# Patient Record
Sex: MALE | Race: OTHER | NOT HISPANIC OR LATINO | Employment: FULL TIME | URBAN - METROPOLITAN AREA
[De-identification: names, ages, dates, MRNs, and addresses within clinical notes are randomized per-mention and may not be internally consistent; named-entity substitution may affect disease eponyms.]

---

## 2018-06-20 ENCOUNTER — OFFICE VISIT (OUTPATIENT)
Dept: URGENT CARE | Facility: PHYSICIAN GROUP | Age: 48
End: 2018-06-20
Payer: COMMERCIAL

## 2018-06-20 VITALS
TEMPERATURE: 96.9 F | BODY MASS INDEX: 24.5 KG/M2 | OXYGEN SATURATION: 97 % | HEIGHT: 71 IN | WEIGHT: 175 LBS | SYSTOLIC BLOOD PRESSURE: 124 MMHG | DIASTOLIC BLOOD PRESSURE: 84 MMHG | HEART RATE: 84 BPM | RESPIRATION RATE: 17 BRPM

## 2018-06-20 DIAGNOSIS — S09.93XA FACIAL INJURY, INITIAL ENCOUNTER: ICD-10-CM

## 2018-06-20 PROCEDURE — 99203 OFFICE O/P NEW LOW 30 MIN: CPT | Performed by: NURSE PRACTITIONER

## 2018-06-20 ASSESSMENT — ENCOUNTER SYMPTOMS
FEVER: 0
CHILLS: 0

## 2018-06-20 ASSESSMENT — PAIN SCALES - GENERAL: PAINLEVEL: NO PAIN

## 2018-06-20 NOTE — PROGRESS NOTES
"Subjective:      Oneal Tillman is a 47 y.o. male who presents with Facial Injury (hit with door monday night)    History reviewed. No pertinent past medical history.  Social History     Social History   • Marital status: Single     Spouse name: N/A   • Number of children: N/A   • Years of education: N/A     Occupational History   • Not on file.     Social History Main Topics   • Smoking status: Current Every Day Smoker     Packs/day: 1.00   • Smokeless tobacco: Never Used   • Alcohol use Yes      Comment: rarely   • Drug use: No   • Sexual activity: Not on file     Other Topics Concern   • Not on file     Social History Narrative   • No narrative on file     History reviewed. No pertinent family history.    Allergies: Patient has no known allergies.    Patient is a 39-year-old male who presents with complaints of localized swelling, bruising, and contusion left eye. Symptoms started 2 days ago. States he was walking into his house and the door/deadbolt hit him in the nose and left eye. Patient denies any pain at this time. He states vision is blurred but he attributes this possibly to swelling. He is concerned about his eye. He is concerned about swelling because he cannot work with his eye swollen slightly. Denies any other injury.            Facial Injury   This is a new problem. Episode onset: 2 days ago. The problem occurs constantly. The problem has been unchanged. Pertinent negatives include no chills or fever. Associated symptoms comments: Left sided facial pain. Nothing aggravates the symptoms. He has tried nothing for the symptoms. The treatment provided no relief.       Review of Systems   Constitutional: Positive for malaise/fatigue. Negative for chills and fever.   HENT:        Left sided facial pain   All other systems reviewed and are negative.         Objective:     /84   Pulse 84   Temp 36.1 °C (96.9 °F)   Resp 17   Ht 1.803 m (5' 11\")   Wt 79.4 kg (175 lb)   SpO2 97%   BMI 24.41 kg/m²  "     Physical Exam   Constitutional: He is oriented to person, place, and time. He appears well-developed and well-nourished. No distress.   HENT:   Head:       Contusion with swelling and discoloration around the eye. No point tenderness over the zygoma or the eyebrow, no point tenderness around the eye.  No point tenderness over the nose. There is a superficial abrasion noted to the left side of the nose.   Eyes:   Superficial swelling of the upper and lower eyelids. PERRLA. Conjunctiva of the left eye is red and injected.   Neck: Normal range of motion. Neck supple.   Cardiovascular: Normal rate and regular rhythm.    Pulmonary/Chest: Effort normal and breath sounds normal.   Neurological: He is alert and oriented to person, place, and time.   Skin: Skin is warm and dry. Capillary refill takes less than 2 seconds. He is not diaphoretic.   Psychiatric: He has a normal mood and affect. His behavior is normal.   Vitals reviewed.    Visual acuity noted          Assessment/Plan:     1. Facial injury, initial encounter  2. Left eye injury  -cold compresses/ice  -ibuprofen as needed  -[facility]; stated patient could come in after 1:00 this afternoon.

## 2024-04-13 ENCOUNTER — OFFICE VISIT (OUTPATIENT)
Dept: URGENT CARE | Facility: CLINIC | Age: 54
End: 2024-04-13
Payer: COMMERCIAL

## 2024-04-13 ENCOUNTER — HOSPITAL ENCOUNTER (INPATIENT)
Facility: MEDICAL CENTER | Age: 54
DRG: 871 | End: 2024-04-13
Attending: EMERGENCY MEDICINE | Admitting: HOSPITALIST
Payer: COMMERCIAL

## 2024-04-13 ENCOUNTER — APPOINTMENT (OUTPATIENT)
Dept: RADIOLOGY | Facility: MEDICAL CENTER | Age: 54
DRG: 871 | End: 2024-04-13
Attending: EMERGENCY MEDICINE
Payer: COMMERCIAL

## 2024-04-13 VITALS
RESPIRATION RATE: 20 BRPM | TEMPERATURE: 98.3 F | DIASTOLIC BLOOD PRESSURE: 80 MMHG | HEART RATE: 98 BPM | SYSTOLIC BLOOD PRESSURE: 120 MMHG | HEIGHT: 71 IN | OXYGEN SATURATION: 100 % | BODY MASS INDEX: 23.8 KG/M2 | WEIGHT: 170 LBS

## 2024-04-13 DIAGNOSIS — B95.5 BACTEREMIA DUE TO STREPTOCOCCUS: ICD-10-CM

## 2024-04-13 DIAGNOSIS — A41.9 SEPSIS WITH ACUTE RENAL FAILURE WITHOUT SEPTIC SHOCK, DUE TO UNSPECIFIED ORGANISM, UNSPECIFIED ACUTE RENAL FAILURE TYPE (HCC): ICD-10-CM

## 2024-04-13 DIAGNOSIS — A41.9 SEPTIC SHOCK (HCC): ICD-10-CM

## 2024-04-13 DIAGNOSIS — L03.116 CELLULITIS OF LEFT LOWER EXTREMITY: ICD-10-CM

## 2024-04-13 DIAGNOSIS — R57.9 SHOCK (HCC): ICD-10-CM

## 2024-04-13 DIAGNOSIS — S81.802A WOUND OF LEFT LOWER EXTREMITY, INITIAL ENCOUNTER: ICD-10-CM

## 2024-04-13 DIAGNOSIS — M79.89 LEG SWELLING: ICD-10-CM

## 2024-04-13 DIAGNOSIS — E87.20 METABOLIC ACIDOSIS: ICD-10-CM

## 2024-04-13 DIAGNOSIS — E87.6 HYPOKALEMIA: ICD-10-CM

## 2024-04-13 DIAGNOSIS — N17.9 ACUTE KIDNEY INJURY (HCC): ICD-10-CM

## 2024-04-13 DIAGNOSIS — N17.9 SEPSIS WITH ACUTE RENAL FAILURE WITHOUT SEPTIC SHOCK, DUE TO UNSPECIFIED ORGANISM, UNSPECIFIED ACUTE RENAL FAILURE TYPE (HCC): ICD-10-CM

## 2024-04-13 DIAGNOSIS — M79.605 PAIN OF LEFT LOWER EXTREMITY: ICD-10-CM

## 2024-04-13 DIAGNOSIS — R65.20 SEPSIS WITH ACUTE RENAL FAILURE WITHOUT SEPTIC SHOCK, DUE TO UNSPECIFIED ORGANISM, UNSPECIFIED ACUTE RENAL FAILURE TYPE (HCC): ICD-10-CM

## 2024-04-13 DIAGNOSIS — R65.21 SEPTIC SHOCK (HCC): ICD-10-CM

## 2024-04-13 DIAGNOSIS — E87.1 HYPONATREMIA: ICD-10-CM

## 2024-04-13 DIAGNOSIS — R78.81 BACTEREMIA DUE TO STREPTOCOCCUS: ICD-10-CM

## 2024-04-13 DIAGNOSIS — E83.39 HYPOPHOSPHATEMIA: ICD-10-CM

## 2024-04-13 PROBLEM — F17.200 TOBACCO DEPENDENCE: Status: ACTIVE | Noted: 2024-04-13

## 2024-04-13 PROBLEM — E86.0 DEHYDRATION: Status: ACTIVE | Noted: 2024-04-13

## 2024-04-13 PROBLEM — R73.9 HYPERGLYCEMIA: Status: ACTIVE | Noted: 2024-04-13

## 2024-04-13 LAB
ALBUMIN SERPL BCP-MCNC: 3.8 G/DL (ref 3.2–4.9)
ALBUMIN/GLOB SERPL: 1.2 G/DL
ALP SERPL-CCNC: 112 U/L (ref 30–99)
ALT SERPL-CCNC: 11 U/L (ref 2–50)
ANION GAP SERPL CALC-SCNC: 19 MMOL/L (ref 7–16)
AST SERPL-CCNC: 16 U/L (ref 12–45)
BASOPHILS # BLD AUTO: 0 % (ref 0–1.8)
BASOPHILS # BLD: 0 K/UL (ref 0–0.12)
BILIRUB SERPL-MCNC: 0.6 MG/DL (ref 0.1–1.5)
BUN SERPL-MCNC: 46 MG/DL (ref 8–22)
CALCIUM ALBUM COR SERPL-MCNC: 8.9 MG/DL (ref 8.5–10.5)
CALCIUM SERPL-MCNC: 8.7 MG/DL (ref 8.4–10.2)
CHLORIDE SERPL-SCNC: 95 MMOL/L (ref 96–112)
CO2 SERPL-SCNC: 20 MMOL/L (ref 20–33)
CREAT SERPL-MCNC: 2.6 MG/DL (ref 0.5–1.4)
EOSINOPHIL # BLD AUTO: 0 K/UL (ref 0–0.51)
EOSINOPHIL NFR BLD: 0 % (ref 0–6.9)
ERYTHROCYTE [DISTWIDTH] IN BLOOD BY AUTOMATED COUNT: 43.8 FL (ref 35.9–50)
GFR SERPLBLD CREATININE-BSD FMLA CKD-EPI: 28 ML/MIN/1.73 M 2
GLOBULIN SER CALC-MCNC: 3.3 G/DL (ref 1.9–3.5)
GLUCOSE BLD STRIP.AUTO-MCNC: 209 MG/DL (ref 65–99)
GLUCOSE SERPL-MCNC: 273 MG/DL (ref 65–99)
HCT VFR BLD AUTO: 46.7 % (ref 42–52)
HGB BLD-MCNC: 15.5 G/DL (ref 14–18)
LACTATE SERPL-SCNC: 2.8 MMOL/L (ref 0.5–2)
LACTATE SERPL-SCNC: 3.7 MMOL/L (ref 0.5–2)
LACTATE SERPL-SCNC: 4.3 MMOL/L (ref 0.5–2)
LG PLATELETS BLD QL SMEAR: NORMAL
LYMPHOCYTES # BLD AUTO: 0.45 K/UL (ref 1–4.8)
LYMPHOCYTES NFR BLD: 3 % (ref 22–41)
MANUAL DIFF BLD: ABNORMAL
MCH RBC QN AUTO: 29.9 PG (ref 27–33)
MCHC RBC AUTO-ENTMCNC: 33.2 G/DL (ref 32.3–36.5)
MCV RBC AUTO: 90.2 FL (ref 81.4–97.8)
METAMYELOCYTES NFR BLD MANUAL: 15 %
MONOCYTES # BLD AUTO: 0.3 K/UL (ref 0–0.85)
MONOCYTES NFR BLD AUTO: 2 % (ref 0–13.4)
NEUTROPHILS # BLD AUTO: 12 K/UL (ref 1.82–7.42)
NEUTROPHILS NFR BLD: 59 % (ref 44–72)
NEUTS BAND NFR BLD MANUAL: 21 % (ref 0–10)
NRBC # BLD AUTO: 0 K/UL
NRBC BLD-RTO: 0 /100 WBC (ref 0–0.2)
PLATELET # BLD AUTO: 286 K/UL (ref 164–446)
PLATELET BLD QL SMEAR: NORMAL
PMV BLD AUTO: 10.3 FL (ref 9–12.9)
POTASSIUM SERPL-SCNC: 4.2 MMOL/L (ref 3.6–5.5)
PROT SERPL-MCNC: 7.1 G/DL (ref 6–8.2)
RBC # BLD AUTO: 5.18 M/UL (ref 4.7–6.1)
RBC BLD AUTO: NORMAL
SODIUM SERPL-SCNC: 134 MMOL/L (ref 135–145)
TOXIC GRANULES BLD QL SMEAR: NORMAL
VARIANT LYMPHS BLD QL SMEAR: NORMAL
WBC # BLD AUTO: 15 K/UL (ref 4.8–10.8)
WBC TOXIC VACUOLES BLD QL SMEAR: NORMAL

## 2024-04-13 PROCEDURE — 93005 ELECTROCARDIOGRAM TRACING: CPT

## 2024-04-13 PROCEDURE — 99291 CRITICAL CARE FIRST HOUR: CPT | Performed by: HOSPITALIST

## 2024-04-13 PROCEDURE — C1751 CATH, INF, PER/CENT/MIDLINE: HCPCS

## 2024-04-13 PROCEDURE — 87040 BLOOD CULTURE FOR BACTERIA: CPT

## 2024-04-13 PROCEDURE — 700105 HCHG RX REV CODE 258: Performed by: HOSPITALIST

## 2024-04-13 PROCEDURE — 83605 ASSAY OF LACTIC ACID: CPT | Mod: 91

## 2024-04-13 PROCEDURE — 99291 CRITICAL CARE FIRST HOUR: CPT

## 2024-04-13 PROCEDURE — 3074F SYST BP LT 130 MM HG: CPT | Performed by: REGISTERED NURSE

## 2024-04-13 PROCEDURE — 700102 HCHG RX REV CODE 250 W/ 637 OVERRIDE(OP): Performed by: HOSPITALIST

## 2024-04-13 PROCEDURE — 87077 CULTURE AEROBIC IDENTIFY: CPT

## 2024-04-13 PROCEDURE — 700111 HCHG RX REV CODE 636 W/ 250 OVERRIDE (IP): Performed by: HOSPITALIST

## 2024-04-13 PROCEDURE — 87015 SPECIMEN INFECT AGNT CONCNTJ: CPT

## 2024-04-13 PROCEDURE — 700111 HCHG RX REV CODE 636 W/ 250 OVERRIDE (IP): Performed by: EMERGENCY MEDICINE

## 2024-04-13 PROCEDURE — 82962 GLUCOSE BLOOD TEST: CPT

## 2024-04-13 PROCEDURE — 87086 URINE CULTURE/COLONY COUNT: CPT

## 2024-04-13 PROCEDURE — 96367 TX/PROPH/DG ADDL SEQ IV INF: CPT

## 2024-04-13 PROCEDURE — 3079F DIAST BP 80-89 MM HG: CPT | Performed by: REGISTERED NURSE

## 2024-04-13 PROCEDURE — 87181 SC STD AGAR DILUTION PER AGT: CPT

## 2024-04-13 PROCEDURE — 02HV33Z INSERTION OF INFUSION DEVICE INTO SUPERIOR VENA CAVA, PERCUTANEOUS APPROACH: ICD-10-PCS | Performed by: EMERGENCY MEDICINE

## 2024-04-13 PROCEDURE — 80053 COMPREHEN METABOLIC PANEL: CPT

## 2024-04-13 PROCEDURE — 96376 TX/PRO/DX INJ SAME DRUG ADON: CPT

## 2024-04-13 PROCEDURE — 85007 BL SMEAR W/DIFF WBC COUNT: CPT

## 2024-04-13 PROCEDURE — 96365 THER/PROPH/DIAG IV INF INIT: CPT

## 2024-04-13 PROCEDURE — 700101 HCHG RX REV CODE 250: Performed by: HOSPITALIST

## 2024-04-13 PROCEDURE — 36415 COLL VENOUS BLD VENIPUNCTURE: CPT

## 2024-04-13 PROCEDURE — 770022 HCHG ROOM/CARE - ICU (200)

## 2024-04-13 PROCEDURE — 99406 BEHAV CHNG SMOKING 3-10 MIN: CPT

## 2024-04-13 PROCEDURE — 96375 TX/PRO/DX INJ NEW DRUG ADDON: CPT

## 2024-04-13 PROCEDURE — 96368 THER/DIAG CONCURRENT INF: CPT

## 2024-04-13 PROCEDURE — 99203 OFFICE O/P NEW LOW 30 MIN: CPT | Performed by: REGISTERED NURSE

## 2024-04-13 PROCEDURE — 36556 INSERT NON-TUNNEL CV CATH: CPT

## 2024-04-13 PROCEDURE — 85027 COMPLETE CBC AUTOMATED: CPT

## 2024-04-13 PROCEDURE — 71045 X-RAY EXAM CHEST 1 VIEW: CPT

## 2024-04-13 PROCEDURE — 700105 HCHG RX REV CODE 258: Performed by: EMERGENCY MEDICINE

## 2024-04-13 PROCEDURE — 83036 HEMOGLOBIN GLYCOSYLATED A1C: CPT

## 2024-04-13 PROCEDURE — 81001 URINALYSIS AUTO W/SCOPE: CPT

## 2024-04-13 RX ORDER — AMOXICILLIN 250 MG
2 CAPSULE ORAL 2 TIMES DAILY
Status: DISCONTINUED | OUTPATIENT
Start: 2024-04-13 | End: 2024-05-01 | Stop reason: HOSPADM

## 2024-04-13 RX ORDER — OXYCODONE HYDROCHLORIDE 10 MG/1
10 TABLET ORAL
Status: DISCONTINUED | OUTPATIENT
Start: 2024-04-13 | End: 2024-05-01 | Stop reason: HOSPADM

## 2024-04-13 RX ORDER — POLYETHYLENE GLYCOL 3350 17 G/17G
1 POWDER, FOR SOLUTION ORAL
Status: DISCONTINUED | OUTPATIENT
Start: 2024-04-13 | End: 2024-05-01 | Stop reason: HOSPADM

## 2024-04-13 RX ORDER — NOREPINEPHRINE BITARTRATE 0.03 MG/ML
0-1 INJECTION, SOLUTION INTRAVENOUS CONTINUOUS
Status: DISCONTINUED | OUTPATIENT
Start: 2024-04-13 | End: 2024-04-14

## 2024-04-13 RX ORDER — ONDANSETRON 4 MG/1
4 TABLET, ORALLY DISINTEGRATING ORAL EVERY 4 HOURS PRN
Status: DISCONTINUED | OUTPATIENT
Start: 2024-04-13 | End: 2024-05-01 | Stop reason: HOSPADM

## 2024-04-13 RX ORDER — LINEZOLID 2 MG/ML
600 INJECTION, SOLUTION INTRAVENOUS EVERY 12 HOURS
Status: DISCONTINUED | OUTPATIENT
Start: 2024-04-13 | End: 2024-04-15

## 2024-04-13 RX ORDER — CEFAZOLIN 2 G/1
2 INJECTION, POWDER, FOR SOLUTION INTRAMUSCULAR; INTRAVENOUS ONCE
Status: COMPLETED | OUTPATIENT
Start: 2024-04-13 | End: 2024-04-13

## 2024-04-13 RX ORDER — NICOTINE 21 MG/24HR
14 PATCH, TRANSDERMAL 24 HOURS TRANSDERMAL
Status: DISCONTINUED | OUTPATIENT
Start: 2024-04-14 | End: 2024-05-01 | Stop reason: HOSPADM

## 2024-04-13 RX ORDER — OXYCODONE HYDROCHLORIDE 5 MG/1
5 TABLET ORAL
Status: DISCONTINUED | OUTPATIENT
Start: 2024-04-13 | End: 2024-05-01 | Stop reason: HOSPADM

## 2024-04-13 RX ORDER — SODIUM CHLORIDE 9 MG/ML
30 INJECTION, SOLUTION INTRAVENOUS ONCE
Status: COMPLETED | OUTPATIENT
Start: 2024-04-13 | End: 2024-04-13

## 2024-04-13 RX ORDER — PROMETHAZINE HYDROCHLORIDE 25 MG/1
12.5-25 TABLET ORAL EVERY 4 HOURS PRN
Status: DISCONTINUED | OUTPATIENT
Start: 2024-04-13 | End: 2024-05-01 | Stop reason: HOSPADM

## 2024-04-13 RX ORDER — ENOXAPARIN SODIUM 100 MG/ML
40 INJECTION SUBCUTANEOUS DAILY
Status: DISCONTINUED | OUTPATIENT
Start: 2024-04-13 | End: 2024-04-13

## 2024-04-13 RX ORDER — PROMETHAZINE HYDROCHLORIDE 25 MG/1
12.5-25 SUPPOSITORY RECTAL EVERY 4 HOURS PRN
Status: DISCONTINUED | OUTPATIENT
Start: 2024-04-13 | End: 2024-05-01 | Stop reason: HOSPADM

## 2024-04-13 RX ORDER — SODIUM CHLORIDE 9 MG/ML
30 INJECTION, SOLUTION INTRAVENOUS
Status: DISCONTINUED | OUTPATIENT
Start: 2024-04-13 | End: 2024-04-13

## 2024-04-13 RX ORDER — SODIUM CHLORIDE, SODIUM LACTATE, POTASSIUM CHLORIDE, CALCIUM CHLORIDE 600; 310; 30; 20 MG/100ML; MG/100ML; MG/100ML; MG/100ML
INJECTION, SOLUTION INTRAVENOUS CONTINUOUS
Status: DISCONTINUED | OUTPATIENT
Start: 2024-04-13 | End: 2024-04-16

## 2024-04-13 RX ORDER — IBUPROFEN 200 MG
400-600 TABLET ORAL EVERY 6 HOURS PRN
COMMUNITY

## 2024-04-13 RX ORDER — HEPARIN SODIUM 5000 [USP'U]/ML
5000 INJECTION, SOLUTION INTRAVENOUS; SUBCUTANEOUS EVERY 8 HOURS
Status: DISCONTINUED | OUTPATIENT
Start: 2024-04-13 | End: 2024-04-26

## 2024-04-13 RX ORDER — HYDROMORPHONE HYDROCHLORIDE 1 MG/ML
0.5 INJECTION, SOLUTION INTRAMUSCULAR; INTRAVENOUS; SUBCUTANEOUS ONCE
Status: COMPLETED | OUTPATIENT
Start: 2024-04-13 | End: 2024-04-13

## 2024-04-13 RX ORDER — ONDANSETRON 2 MG/ML
4 INJECTION INTRAMUSCULAR; INTRAVENOUS EVERY 4 HOURS PRN
Status: DISCONTINUED | OUTPATIENT
Start: 2024-04-13 | End: 2024-05-01 | Stop reason: HOSPADM

## 2024-04-13 RX ORDER — ACETAMINOPHEN 325 MG/1
650 TABLET ORAL EVERY 6 HOURS PRN
Status: DISCONTINUED | OUTPATIENT
Start: 2024-04-13 | End: 2024-05-01 | Stop reason: HOSPADM

## 2024-04-13 RX ORDER — PROCHLORPERAZINE EDISYLATE 5 MG/ML
5-10 INJECTION INTRAMUSCULAR; INTRAVENOUS EVERY 4 HOURS PRN
Status: DISCONTINUED | OUTPATIENT
Start: 2024-04-13 | End: 2024-05-01 | Stop reason: HOSPADM

## 2024-04-13 RX ORDER — SODIUM CHLORIDE, SODIUM LACTATE, POTASSIUM CHLORIDE, AND CALCIUM CHLORIDE .6; .31; .03; .02 G/100ML; G/100ML; G/100ML; G/100ML
500 INJECTION, SOLUTION INTRAVENOUS
Status: DISCONTINUED | OUTPATIENT
Start: 2024-04-13 | End: 2024-04-14

## 2024-04-13 RX ORDER — HYDROMORPHONE HYDROCHLORIDE 1 MG/ML
0.5 INJECTION, SOLUTION INTRAMUSCULAR; INTRAVENOUS; SUBCUTANEOUS
Status: DISCONTINUED | OUTPATIENT
Start: 2024-04-13 | End: 2024-05-01 | Stop reason: HOSPADM

## 2024-04-13 RX ADMIN — SODIUM CHLORIDE, POTASSIUM CHLORIDE, SODIUM LACTATE AND CALCIUM CHLORIDE: 600; 310; 30; 20 INJECTION, SOLUTION INTRAVENOUS at 22:09

## 2024-04-13 RX ADMIN — HYDROMORPHONE HYDROCHLORIDE 0.5 MG: 1 INJECTION, SOLUTION INTRAMUSCULAR; INTRAVENOUS; SUBCUTANEOUS at 21:16

## 2024-04-13 RX ADMIN — INSULIN HUMAN 3 UNITS: 100 INJECTION, SOLUTION PARENTERAL at 22:17

## 2024-04-13 RX ADMIN — PIPERACILLIN AND TAZOBACTAM 4.5 G: 4; .5 INJECTION, POWDER, FOR SOLUTION INTRAVENOUS at 18:57

## 2024-04-13 RX ADMIN — LINEZOLID 600 MG: 600 INJECTION, SOLUTION INTRAVENOUS at 20:47

## 2024-04-13 RX ADMIN — CEFAZOLIN 2 G: 2 INJECTION, POWDER, FOR SOLUTION INTRAMUSCULAR; INTRAVENOUS at 17:20

## 2024-04-13 RX ADMIN — NOREPINEPHRINE BITARTRATE 1 MCG/KG/MIN: 1 INJECTION, SOLUTION, CONCENTRATE INTRAVENOUS at 20:44

## 2024-04-13 RX ADMIN — SODIUM CHLORIDE 2310 ML: 9 INJECTION, SOLUTION INTRAVENOUS at 17:02

## 2024-04-13 RX ADMIN — HYDROMORPHONE HYDROCHLORIDE 0.5 MG: 1 INJECTION, SOLUTION INTRAMUSCULAR; INTRAVENOUS; SUBCUTANEOUS at 20:49

## 2024-04-13 RX ADMIN — HEPARIN SODIUM 5000 UNITS: 5000 INJECTION, SOLUTION INTRAVENOUS; SUBCUTANEOUS at 22:27

## 2024-04-13 RX ADMIN — SODIUM CHLORIDE, POTASSIUM CHLORIDE, SODIUM LACTATE AND CALCIUM CHLORIDE: 600; 310; 30; 20 INJECTION, SOLUTION INTRAVENOUS at 18:15

## 2024-04-13 RX ADMIN — PIPERACILLIN AND TAZOBACTAM 4.5 G: 4; .5 INJECTION, POWDER, FOR SOLUTION INTRAVENOUS at 22:33

## 2024-04-13 ASSESSMENT — ENCOUNTER SYMPTOMS
FEVER: 1
BRUISES/BLEEDS EASILY: 0
FLANK PAIN: 0
DIZZINESS: 0
EYE REDNESS: 0
STRIDOR: 0
PALPITATIONS: 1
LOSS OF CONSCIOUSNESS: 0
COUGH: 0
FOCAL WEAKNESS: 0
CHILLS: 1
ABDOMINAL PAIN: 0
NERVOUS/ANXIOUS: 0
SHORTNESS OF BREATH: 0
VOMITING: 0
PALPITATIONS: 0
EYE DISCHARGE: 0
SHORTNESS OF BREATH: 0
CHILLS: 1

## 2024-04-13 ASSESSMENT — PATIENT HEALTH QUESTIONNAIRE - PHQ9
2. FEELING DOWN, DEPRESSED, IRRITABLE, OR HOPELESS: NOT AT ALL
1. LITTLE INTEREST OR PLEASURE IN DOING THINGS: NOT AT ALL
SUM OF ALL RESPONSES TO PHQ9 QUESTIONS 1 AND 2: 0

## 2024-04-13 ASSESSMENT — COGNITIVE AND FUNCTIONAL STATUS - GENERAL
SUGGESTED CMS G CODE MODIFIER DAILY ACTIVITY: CH
MOBILITY SCORE: 24
SUGGESTED CMS G CODE MODIFIER MOBILITY: CH
DAILY ACTIVITIY SCORE: 24

## 2024-04-13 ASSESSMENT — LIFESTYLE VARIABLES
CONSUMPTION TOTAL: NEGATIVE
DOES PATIENT WANT TO STOP DRINKING: NO
EVER HAD A DRINK FIRST THING IN THE MORNING TO STEADY YOUR NERVES TO GET RID OF A HANGOVER: NO
HOW MANY TIMES IN THE PAST YEAR HAVE YOU HAD 5 OR MORE DRINKS IN A DAY: 0
HAVE YOU EVER FELT YOU SHOULD CUT DOWN ON YOUR DRINKING: NO
TOTAL SCORE: 0
EVER FELT BAD OR GUILTY ABOUT YOUR DRINKING: NO
HAVE PEOPLE ANNOYED YOU BY CRITICIZING YOUR DRINKING: NO
ALCOHOL_USE: NO
ON A TYPICAL DAY WHEN YOU DRINK ALCOHOL HOW MANY DRINKS DO YOU HAVE: 0
TOTAL SCORE: 0
AVERAGE NUMBER OF DAYS PER WEEK YOU HAVE A DRINK CONTAINING ALCOHOL: 0
TOTAL SCORE: 0

## 2024-04-13 ASSESSMENT — FIBROSIS 4 INDEX
FIB4 SCORE: 0.91
FIB4 SCORE: 0.91

## 2024-04-13 ASSESSMENT — PAIN DESCRIPTION - PAIN TYPE
TYPE: ACUTE PAIN
TYPE: ACUTE PAIN

## 2024-04-13 NOTE — LETTER
"  FORM C-4:  EMPLOYEE’S CLAIM FOR COMPENSATION/ REPORT OF INITIAL TREATMENT  EMPLOYEE’S CLAIM - PROVIDE ALL INFORMATION REQUESTED   First Name Oneal Last Name Primo Birthdate 1970  Sex male Claim Number   Home Address [address]   [place]             Zip [address]                                   Age  53 y.o. Height  1.803 m (5' 10.98\") Weight  80.2 kg (176 lb 12.9 oz) N  [id number]   Mailing Address 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 (if applicable) Working    How did this injury or occupational disease occur? Be specific and answer in detail. Use additional sheet if necessary)  Banged my shin in a steel concrete form stake that was driven in the ground   If you believe that you have an occupational disease, when did you first have knowledge of the disability and it relationship to your employment? N/A Witnesses to the Accident  Whole crew   Nature of Injury or Occupational Disease  Workers' Compensation Part(s) of Body Injured or Affected  Lower Leg (L), N/A, N/A    I CERTIFY THAT THE ABOVE IS TRUE AND CORRECT TO THE BEST OF MY KNOWLEDGE AND THAT I HAVE PROVIDED THIS INFORMATION IN ORDER TO OBTAIN THE BENEFITS OF [place] " IN[place]IVE OR [place]).  I HEREBY AUTHORIZE ANY PHYSICIAN, CHIROPRACTOR, SURGEON, PRACTITIONER, OR OTHER PERSON, ANY HOSPITAL, INCLUDING [facility], ANY MEDICAL SERVICE ORGANIZATION, ANY INSURANCE COMPANY, OR OTHER INSTITUTION OR ORGANIZATION TO RELEASE TO EACH OTHER, ANY MEDICAL OR OTHER INFORMATION, INCLUDING BENEFITS PAID OR PAYABLE, PERTINENT TO THIS INJURY OR DISEASE, EXCEPT INFORMATION RELATIVE TO DIAGNOSIS, TREATMENT AND/OR COUNSELING FOR AIDS, PSYCHOLOGICAL CONDITIONS, ALCOHOL OR CONTROLLED SUBSTANCES, FOR WHICH I MUST GIVE SPECIFIC AUTHORIZATION.  A PHOTOSTAT OF THIS AUTHORIZATION SHALL BE AS VALID AS THE ORIGINAL.  Date   4/16/2024            Place  [facility]                          Employee’s Signature   THIS REPORT MUST BE COMPLETED AND MAILED WITHIN 3 WORKING DAYS OF TREATMENT   Place [facility]                       Name of Facility [facility]   Date  4/13/2024 Diagnosis  (L03.116) Cellulitis of left lower extremity  (A41.9,  R65.20,  N17.9) Sepsis with acute renal failure without septic shock, due to unspecified organism, unspecified acute renal failure type (HCC) Is there evidence the injured employee was under the influence of alcohol and/or another controlled substance at the time of accident?   Hour  12:35 PM Description of Injury or Disease  Cellulitis of left lower extremity  Sepsis with acute renal failure without septic shock, due to unspecified organism, unspecified acute renal failure type (HCC) No   Treatment  Antibiotics, vasopressors, admission  Have you advised the patient to remain off work five days or more?         No   X-Ray Findings  Negative If Yes   From Date    To Date      From information given by the employee, together with medical evidence, can you directly connect this injury or occupational disease as job incurred? Yes If No, is employee capable of: Full Duty  No Modified Duty  No   Is additional medical  "care by a physician indicated? Yes If Modified Duty, Specify any Limitations / Restrictions   Patient is currently admitted to the hospital and will require clearance by admitting physician prior to returning to work.   Do you know of any previous injury or disease contributing to this condition or occupational disease? No    Date 4/16/2024 Print Doctor’s Name LexyMayuren S GLORIA certify the employer’s copy of this form was mailed on:   Address [address] [place] [id number] [phone] INSURER’S USE ONLY   Provider’s Tax ID Number   Telephone Dept: [phone]    Doctor’s Signature e-LESLI Lujan M.D. Degree  MD      Form C-4 (rev.10/07)                                                                         BRIEF DESCRIPTION OF RIGHTS AND BENEFITS  (Pursuant to [place] 616C.[place])    Notice of Injury or Occupational Disease (Incident Report Form C-1): If an injury or occupational disease (OD) arises out of and in the course of employment, you must provide written notice to your employer as soon as practicable, but no later than 7 days after the accident or OD. Your employer shall maintain a sufficient supply of the required forms.    Claim for Compensation (Form C-4): If medical treatment is sought, the form C-4 is available at the place of initial treatment. A completed \"Claim for Compensation\" (Form C-4) must be filed within 90 days after an accident or OD. The treating physician or chiropractor must, within 3 working days after treatment, complete and mail to the employer, the employer's insurer and third-party , the Claim for Compensation.    Medical Treatment: If you require medical treatment for your on-the-job injury or OD, you may be required to select a physician or chiropractor from a list provided by your workers’ compensation insurer, if it has contracted with an Organization for Managed Care (MCO) or Preferred Provider Organization (PPO) or providers of health care. If your " employer has not entered into a contract with an MCO or PPO, you may select a physician or chiropractor from the Panel of Physicians and Chiropractors. Any medical costs related to your industrial injury or OD will be paid by your insurer.    Temporary Total Disability (TTD): If your doctor has certified that you are unable to work for a period of at least 5 consecutive days, or 5 cumulative days in a 20-day period, or places restrictions on you that your employer does not accommodate, you may be entitled to TTD compensation.    Temporary Partial Disability (TPD): If the wage you receive upon reemployment is less than the compensation for TTD to which you are entitled, the insurer may be required to pay you TPD compensation to make up the difference. TPD can only be paid for a maximum of 24 months.    Permanent Partial Disability (PPD): When your medical condition is stable and there is an indication of a PPD as a result of your injury or OD, within 30 days, your insurer must arrange for an evaluation by a rating physician or chiropractor to determine the degree of your PPD. The amount of your PPD award depends on the date of injury, the results of the PPD evaluation, your age and wage.    Permanent Total Disability (PTD): If you are medically certified by a treating physician or chiropractor as permanently and totally disabled and have been granted a PTD status by your insurer, you are entitled to receive monthly benefits not to exceed 66 2/3% of your average monthly wage. The amount of your PTD payments is subject to reduction if you previously received a lump-sum PPD award.    Vocational Rehabilitation Services: You may be eligible for vocational rehabilitation services if you are unable to return to the job due to a permanent physical impairment or permanent restrictions as a result of your injury or occupational disease.    Transportation and Per Shira Reimbursement: You may be eligible for travel expenses and  per noe associated with medical treatment.    Reopening: You may be able to reopen your claim if your condition worsens after claim closure.     Appeal Process: If you disagree with a written determination issued by the insurer or the insurer does not respond to your request, you may appeal to the Department of Administration, , by following the instructions contained in your determination letter. You must appeal the determination within 70 days from the date of the determination letter at [address], [address], [place], [place] [address], or [address], [address], [place], [place] [address]. If you disagree with the  decision, you may appeal to the Department of Administration, . You must file your appeal within 30 days from the date of the  decision letter at [address], [address], [place], [place] [address], or [address] [facility], [place], [place] [address]. If you disagree with a decision of an , you may file a petition for judicial review with the District Court. You must do so within 30 days of the Appeal Officer’s decision. You may be represented by an  at your own expense or you may contact the [facility] for possible representation.    [place]  for Injured Workers (NAIW): If you disagree with a  decision, you may request that NAIW represent you without charge at an  Hearing. For information regarding denial of benefits, you may contact the [facility] at: [address], [address], [place] [address], [phone], or [address] [facility] [address] [facility] [address] [address], [place] [address], [phone]    To File a Complaint with the Division: If you wish to file a complaint with the  of the Division of Industrial Relations (DIR),  please contact the Workers’ Compensation Section, [address], [address], [place], [place] [address], telephone  [phone], or [address], [address], [place], [place] [address], telephone [phone].    For assistance with Workers’ Compensation Issues: You may contact the [facility] Office for Consumer Health Assistance, [address], [address], [place] [address], Toll Free [phone], Web site: http://[facility].[place].gov/Programs/LATISHA E-mail: latisha@[facility].[place].gov  D-2 (rev. 10/20)              __________________________________________________________________                                    _________________            Employee Name / Signature                                                                                                                            Date

## 2024-04-13 NOTE — ED TRIAGE NOTES
"Chief Complaint   Patient presents with    Leg Swelling     L leg x1 week. Denies CP or SOB. Denies wound or rash. Endorses nausea.    Body Aches     Reports Monday, feeling that he had h/a, body aches, nausea. Reports improvement Tuesday but states \"then I couldn't hold food down\". Reports N/V persists.      Physical Exam  Pulmonary:      Effort: Pulmonary effort is normal.   Skin:     General: Skin is warm and dry.   Neurological:      Mental Status: He is alert.         "

## 2024-04-13 NOTE — ED PROVIDER NOTES
"ED Provider Note    Primary care provider: Pcp Not In Computer    CHIEF COMPLAINT  Chief Complaint   Patient presents with    Leg Swelling     L leg x1 week. Denies CP or SOB. Denies wound or rash. Endorses nausea.    Body Aches     Reports Monday, feeling that he had h/a, body aches, nausea. Reports improvement Tuesday but states \"then I couldn't hold food down\". Reports N/V persists.          HPI  Oneal Tillman is a 53 y.o. male smoker,  who presents to the Emergency Department 4 days of gradually worsening pain in the left lower leg.  He sustained an abrasion last week at work, he did not think much of it as he has had this multiple times previously.  About 5 days ago he noted some lesions surrounding the abrasion.  Over the past 4 days he had noticed redness, swelling, pain over the posterior left thigh, gradually creeping proximally.  He denies any fevers or chills.  Denies any lightheadedness.  Notes the pain is worse when he walks.  Denies any history of diabetes or DVT/PE.  Does not take any medications.      External Record Review: Patient has not been seen in our facility previously, he did go to the urgent care today for left lower extremity swelling, they transferred him here for further evaluation, no testing was done.    REVIEW OF SYSTEMS  See HPI.     PAST MEDICAL HISTORY   has a past medical history of Patient denies medical problems.    SURGICAL HISTORY  patient denies any surgical history    SOCIAL HISTORY  Social History     Tobacco Use    Smoking status: Every Day     Current packs/day: 1.00     Types: Cigarettes    Smokeless tobacco: Never   Vaping Use    Vaping Use: Never used   Substance Use Topics    Alcohol use: Yes     Comment: rarely    Drug use: No      Social History     Substance and Sexual Activity   Drug Use No       FAMILY HISTORY  No family history on file.    CURRENT MEDICATIONS  Reviewed.  See Encounter Summary.     ALLERGIES  No Known Allergies    PHYSICAL " "EXAM  VITAL SIGNS: BP (!) 86/59   Pulse 94   Temp 36.1 °C (96.9 °F) (Temporal)   Resp 20   Ht 1.803 m (5' 11\")   Wt 77 kg (169 lb 12.1 oz)   SpO2 100%   BMI 23.68 kg/m²   Constitutional: Awake, alert in no apparent distress.  HENT: Normocephalic, Bilateral external ears normal. Nose normal.   Eyes: Conjunctiva normal, non-icteric, EOMI.    Thorax & Lungs: Easy unlabored respirations, Clear to ascultation bilaterally.  Cardiovascular: Tachycardic, No murmurs, rubs or gallops. Bilateral pulses symmetrical.   Abdomen:  Soft, nontender, nondistended, normal active bowel sounds.   :    Skin: Visualized skin is  Dry, No erythema, No rash.   Musculoskeletal:   See photograph below, left lower extremity has localized swelling over the distal thigh, this area is indurated, red, warm to the touch but nontender.  Trace knee effusion, good range of motion of the knee without difficulty.  Neurologic: Alert, Grossly non-focal.   Psychiatric: Normal affect, Normal mood  Lymphatic:      RADIOLOGY  I have independently interpreted the diagnostic imaging associated with this visit and am waiting the final reading from the radiologist.   My preliminary interpretation is as follows: No gas, no definitive abscess noted on CT.    Radiologist interpretation:   CT-EXTREMITY, LOWER W/O LEFT   Final Result      1.  Left lower extremity edema versus cellulitis. No drainable fluid collections on this noncontrast study.   2.  Enlarged left external iliac and inguinal lymph nodes. They may be reactive to the left lower extremity infection, however, neoplastic nodes are difficult to rule out. Close clinical follow-up is needed.      DX-CHEST-PORTABLE (1 VIEW)   Final Result      No acute cardiopulmonary abnormality.             COURSE & MEDICAL DECISION MAKING  Pertinent Labs & Imaging studies reviewed. (See chart for details)    COURSE & MEDICAL DECISION MAKING  Pertinent Labs & Imaging studies reviewed. (See chart for " details)    Differential diagnoses include but are not limited to: Most likely cellulitis/sepsis, less likely DVT, much less likely necrotizing fasciitis    4:15 PM - Nursing notes reviewed, patient seen and examined at bedside.  Patient appears septic, antibiotics will be initiated at this time.    5:13 PM: Patient with significant bandemia, renal failure, hyperglycemia, much higher risk for necrotizing fasciitis.  Ultrasound canceled, CT ordered    5:45 PM CT does not show any compelling evidence of neck testing fasciitis.  Given my concern I did briefly talk with Dr. Love, general surgery.  He will follow along, without any gas or fluid collection, unlikely surgical.    6 PM patient reassessed, systolic blood pressure 86.  No longer tachycardic, patient feels fine, having minimal pain at this time.  Thus far has only received 600 cc of IV fluids.  Repeat lactate modestly improved to 3.7    6:21 PM: Case discussed with Dr. Hayes who will evaluate the patient for admission.    8:30 PM patient still quite hypotensive despite 30 cc/kg bolus.  Will place central line.    9:09 PM: Central line complete, I verified placement on chest x-ray.    Discussion of management with other medical personnel: General surgery, hospitalist      Barriers to care at this time, including but not limited to: Patient does not have established PCP.     Decision tools and prescription drugs considered including, but not limited to: LRINEC score possibly as high as 10, CRP not obtained.    Decision Making:  This is a pleasant 53 y.o. year old male who presents with about 4 to 5 days of redness, swelling of the left lower leg.  The patient presents tachycardic, hypotensive.  Exam looks to be like cellulitis.  He has significant laboratory abnormalities, notably hyperglycemia, bandemia, leukocytosis, ANDRE.  He was aggressively resuscitated, treated with Unasyn immediately upon arrival to the ER.  CT does not show any compelling evidence  of necrotizing fasciitis.  I believe he discussed with general surgery who will follow along.  Patient's lactate cleared mildly but this was only after about 600 cc of fluids which I think is a reasonable sign of improvement.    Despite this he is well-appearing, but will need aggressive treatment with continued fluids and antibiotics.  At this time maps over 65, does not need pressors.    CRITICAL CARE  The very real possibilty of a deterioration of this patient's condition required the highest level of my preparedness for sudden, emergent intervention.  I provided critical care services, which included medication orders, frequent reevaluations of the patient's condition and response to treatment, ordering and reviewing test results, and discussing the case with various consultants.  The critical care time associated with the care of the patient was 30 minutes. Review chart for interventions. This time is exclusive of any other billable procedures.           FINAL IMPRESSION  1. Cellulitis of left lower extremity    2. Sepsis with acute renal failure without septic shock, due to unspecified organism, unspecified acute renal failure type (HCC)       Central Line Insertion    Date/Time: 4/13/2024 9:09 PM    Performed by: Leno Jones M.D.  Authorized by: Leno Jones M.D.    Consent:     Consent obtained:  Verbal    Consent given by:  Patient    Risks discussed:  Infection, pneumothorax and bleeding    Alternatives discussed:  No treatment  Pre-procedure details:     Hand hygiene: Hand hygiene performed prior to insertion      Sterile barrier technique: All elements of maximal sterile technique followed      Skin preparation:  2% chlorhexidine    Skin preparation agent: Skin preparation agent completely dried prior to procedure    Sedation:     Sedation type: Received Dilaudid 0.5 mg prior to procedure.  Anesthesia:     Anesthesia method:  Local infiltration    Local anesthetic:  Lidocaine 1% w/o epi  Procedure  details:     Location:  R internal jugular    Patient position:  Reverse Trendelenburg    Procedural supplies:  Triple lumen    Catheter size:  8.5 Fr    Landmarks identified: yes      Ultrasound guidance: yes      Sterile ultrasound techniques: Sterile gel and sterile probe covers were used      Number of attempts:  1    Successful placement: yes    Post-procedure details:     Post-procedure:  Dressing applied and line sutured    Guidewire: guidewire removal confirmed      Assessment:  Blood return through all ports, no pneumothorax on x-ray, placement verified by x-ray and free fluid flow    Patient tolerance of procedure:  Tolerated well, no immediate complications

## 2024-04-13 NOTE — PROGRESS NOTES
"Subjective:   Oneal Tillman is a 53 y.o. male who presents for Leg Pain (X 5 days, LT leg swelling, bruising, concerned about an insect bite. Nausea. Stinging, burning.)      HPI  5 days ago developed flu like symptoms, with some vomiting on Tuesday. On Tuesday he developed left leg swelling, gradually worsening. Pain is burning and tingling t/o the leg. Pain rated 6/10. He is having worsening pain with weight bearing activities. No trauma or injury.  Entire left leg is swollen with erythema and warmth noted posteriorly. Denies hx of blood clots, no recent long travel, he does smoke.     Review of Systems   Constitutional:  Positive for chills.   Respiratory:  Negative for shortness of breath.    Cardiovascular:  Positive for leg swelling. Negative for chest pain and palpitations.   Neurological:  Negative for dizziness and loss of consciousness.       Medications, Allergies, and current problem list reviewed today in Epic.     Objective:     /80   Pulse 98   Temp 36.8 °C (98.3 °F) (Temporal)   Resp 20   Ht 1.803 m (5' 11\")   Wt 77.1 kg (170 lb)   SpO2 100%     Physical Exam  Vitals and nursing note reviewed.   Constitutional:       Appearance: Normal appearance. He is ill-appearing. He is not toxic-appearing.   HENT:      Mouth/Throat:      Mouth: Mucous membranes are moist.   Eyes:      Pupils: Pupils are equal, round, and reactive to light.   Cardiovascular:      Rate and Rhythm: Normal rate and regular rhythm.      Heart sounds: No murmur heard.  Pulmonary:      Effort: Pulmonary effort is normal. No respiratory distress.      Breath sounds: Normal breath sounds.   Musculoskeletal:         General: Normal range of motion.      Cervical back: Normal range of motion.      Left lower leg: Edema present.      Comments: Left lower extremity significantly larger than right.  Skin is taut.  Erythema warmth and tenderness primarily posteriorly.  Decreased range of motion secondary to pain.   Skin:     " General: Skin is warm and dry.      Capillary Refill: Capillary refill takes less than 2 seconds.      Findings: Erythema present.             Comments: Erythema warmth tenderness noted to the posterior aspect of left lower extremity.  Significant swelling compared to right.  C shaped rash on the left shin.  Cap refill appears to be less distally left lower extremity versus right.   Neurological:      General: No focal deficit present.      Mental Status: He is alert and oriented to person, place, and time.      Cranial Nerves: No cranial nerve deficit.   Psychiatric:         Mood and Affect: Mood normal.         Assessment/Plan:     I personally reviewed prior external notes and test results pertinent to today's visit as well as additional imaging and testing completed in clinic today.     1. Leg swelling        2. Pain of left lower extremity            Oneal Tillman is a very pleasant 53 y.o. male presenting with worsening left lower extremity pain, swelling, redness and warmth.  There was no trauma or injury.  Denies a history of blood clots and no recent long travel but he does smoke.  Pain is progressively worsening and having decreased ability to ambulate secondary to pain. On exam the entire left leg is swollen, red, tender, warm, and it extends from ankle to upper thigh. Decreased cap refill in left distal LE compared to right. He does appear ill.  Thankfully at this time as his vitals are reassuring.Given history and physical patient requires higher level of care to rule out DVT, ddx also includes cellulitis. Patient is cash pay but he is aggreeable to this plan since we cannot do an appropriate workup in a timely manner outpatient.      Please note that this dictation was created using voice recognition software. I have made every reasonable attempt to correct obvious errors, but I expect that there are errors of grammar and possibly content that I did not discover before finalizing the note.    This  note was electronically signed by STANLEY Echeverria

## 2024-04-13 NOTE — ED NOTES
Pharmacy Medication Reconciliation      ~Medication reconciliation updated and complete per patient at bedside   ~Allergies have been verified   ~No oral ABX within the last 30 days  ~Patient home pharmacy :  [facility]      ~Anticoagulants (rivaroxaban, apixaban, edoxaban, dabigatran, warfarin, enoxaparin) taken in the last 14 days? No

## 2024-04-13 NOTE — LETTER
"  FORM C-4:  EMPLOYEE’S CLAIM FOR COMPENSATION/ REPORT OF INITIAL TREATMENT  EMPLOYEE’S CLAIM - PROVIDE ALL INFORMATION REQUESTED   First Name Oneal Last Name Primo Birthdate 1970  Sex male Claim Number   Home Address [address]   [place]             Zip [address]                                   Age  53 y.o. Height  1.803 m (5' 10.98\") Weight  80.2 kg (176 lb 12.9 oz) [facility]     Mailing Address 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 (if applicable) Working    How did this injury or occupational disease occur? Be specific and answer in detail. Use additional sheet if necessary)  Banged my shin in a steel concrete form stake that was driven in the ground   If you believe that you have an occupational disease, when did you first have knowledge of the disability and it relationship to your employment? N/A Witnesses to the Accident  Whole crew   Nature of Injury or Occupational Disease  Workers' Compensation Part(s) of Body Injured or Affected  Lower Leg (L), N/A, N/A    I CERTIFY THAT THE ABOVE IS TRUE AND CORRECT TO THE BEST OF MY KNOWLEDGE AND THAT I HAVE PROVIDED THIS INFORMATION IN ORDER TO OBTAIN THE BENEFITS OF [place]).  I HEREBY AUTHORIZE ANY PHYSICIAN, CHIROPRACTOR, SURGEON, PRACTITIONER, OR OTHER PERSON, ANY HOSPITAL, INCLUDING [facility], ANY MEDICAL SERVICE ORGANIZATION, ANY INSURANCE COMPANY, OR OTHER INSTITUTION OR ORGANIZATION TO RELEASE TO EACH OTHER, ANY MEDICAL OR OTHER INFORMATION, INCLUDING BENEFITS PAID OR PAYABLE, PERTINENT TO THIS INJURY OR DISEASE, EXCEPT INFORMATION RELATIVE TO DIAGNOSIS, TREATMENT AND/OR COUNSELING FOR AIDS, PSYCHOLOGICAL CONDITIONS, ALCOHOL OR CONTROLLED SUBSTANCES, FOR WHICH I MUST GIVE SPECIFIC AUTHORIZATION.  A PHOTOSTAT OF THIS AUTHORIZATION SHALL BE AS VALID AS THE ORIGINAL.  Date   4/16/24          [facility]                                        Employee’s Signature   THIS REPORT MUST BE COMPLETED AND MAILED WITHIN 3 WORKING DAYS OF TREATMENT   [facility]                       Name of Facility [facility]   Date  4/13/2024 Diagnosis  (L03.116) Cellulitis of left lower extremity  (A41.9,  R65.20,  N17.9) Sepsis with acute renal failure without septic shock, due to unspecified organism, unspecified acute renal failure type (HCC) Is there evidence the injured employee was under the influence of alcohol and/or another controlled substance at the time of accident?   Hour  12:44 PM Description of Injury or Disease  Cellulitis of left lower extremity  Sepsis with acute renal failure without septic shock, due to unspecified organism, unspecified acute renal failure type (HCC) No   Treatment  Antibiotics, vasopressors, admission  Have you advised the patient to remain off work five days or more?         No   X-Ray Findings  Negative If Yes   From Date    To Date      From information given by the employee, together with medical evidence, can you directly connect this injury or occupational disease as job incurred? Yes If No, is employee capable of: Full Duty  No Modified Duty  No   Is  "additional medical care by a physician indicated? Yes If Modified Duty, Specify any Limitations / Restrictions   Patient is currently admitted to the hospital and will require clearance by admitting physician prior to returning to work.   Do you know of any previous injury or disease contributing to this condition or occupational disease? No    Date 4/16/2024 Print Doctor’s Name Lesli Pugh S GLORIA certify the employer’s copy of this form was mailed on:   Address [address] [place] [id number] [phone] INSURER’S USE ONLY   Provider’s Tax ID Number   Telephone Dept: [phone]    Doctor’s Signature e-LESLI Lujan M.D. Degree  MD      Form C-4 (rev.10/07)                                                                         BRIEF DESCRIPTION OF RIGHTS AND BENEFITS  (Pursuant to [place])    Notice of Injury or Occupational Disease (Incident Report Form C-1): If an injury or occupational disease (OD) arises out of and in the course of employment, you must provide written notice to your employer as soon as practicable, but no later than 7 days after the accident or OD. Your employer shall maintain a sufficient supply of the required forms.    Claim for Compensation (Form C-4): If medical treatment is sought, the form C-4 is available at the place of initial treatment. A completed \"Claim for Compensation\" (Form C-4) must be filed within 90 days after an accident or OD. The treating physician or chiropractor must, within 3 working days after treatment, complete and mail to the employer, the employer's insurer and third-party , the Claim for Compensation.    Medical Treatment: If you require medical treatment for your on-the-job injury or OD, you may be required to select a physician or chiropractor from a list provided by your workers’ compensation insurer, if it has contracted with an Organization for Managed Care (MCO) or Preferred Provider Organization (PPO) or providers of health " care. If your employer has not entered into a contract with an MCO or PPO, you may select a physician or chiropractor from the Panel of Physicians and Chiropractors. Any medical costs related to your industrial injury or OD will be paid by your insurer.    Temporary Total Disability (TTD): If your doctor has certified that you are unable to work for a period of at least 5 consecutive days, or 5 cumulative days in a 20-day period, or places restrictions on you that your employer does not accommodate, you may be entitled to TTD compensation.    Temporary Partial Disability (TPD): If the wage you receive upon reemployment is less than the compensation for TTD to which you are entitled, the insurer may be required to pay you TPD compensation to make up the difference. TPD can only be paid for a maximum of 24 months.    Permanent Partial Disability (PPD): When your medical condition is stable and there is an indication of a PPD as a result of your injury or OD, within 30 days, your insurer must arrange for an evaluation by a rating physician or chiropractor to determine the degree of your PPD. The amount of your PPD award depends on the date of injury, the results of the PPD evaluation, your age and wage.    Permanent Total Disability (PTD): If you are medically certified by a treating physician or chiropractor as permanently and totally disabled and have been granted a PTD status by your insurer, you are entitled to receive monthly benefits not to exceed 66 2/3% of your average monthly wage. The amount of your PTD payments is subject to reduction if you previously received a lump-sum PPD award.    Vocational Rehabilitation Services: You may be eligible for vocational rehabilitation services if you are unable to return to the job due to a permanent physical impairment or permanent restrictions as a result of your injury or occupational disease.    Transportation and Per Shira Reimbursement: You may be eligible for travel  expenses and per noe associated with medical treatment.    Reopening: You may be able to reopen your claim if your condition worsens after claim closure.     Appeal Process: If you disagree with a written determination issued by the insurer or the insurer does not respond to your request, you may appeal to the Department of Administration, , by following the instructions contained in your determination letter. You must appeal the determination within 70 days from the date of the determination letter at [address], [address], [place], [place] [address], or [address], [place], [place] [address]. If you disagree with the  decision, you may appeal to the Department of Administration, . You must file your appeal within 30 days from the date of the  decision letter at [address], [address], [place], [place] [address], or [address], [place], [place] [address]. If you disagree with a decision of an , you may file a petition for judicial review with the District Court. You must do so within 30 days of the Appeal Officer’s decision. You may be represented by an  at your own expense or you may contact the [facility] for possible representation.    [place]  for Injured Workers (NAIW): If you disagree with a  decision, you may request that NAIW represent you without charge at an  Hearing. For information regarding denial of benefits, you may contact the [facility] at: [address], [address], [place] [address], [phone], or [address], [place] [address], [phone]    To File a Complaint with the Division: If you wish to file a complaint with the  of the Division of Industrial Relations (DIR),  please contact the Workers’ Compensation Section, [address], [address], [place], [place] [address],  telephone [phone], or [address], [address], [place], [place] [address], telephone [phone].    For assistance with Workers’ Compensation Issues: You may contact the [facility] Office for Consumer Health Assistance, [address], [address], [place] [address], Toll Free 1-182.483.9373, Web site: http://[facility].[place].gov/Programs/LATISHA E-mail: latisha@[email or web address].[place].gov  D-2 (rev. 10/20)              __________________________________________________________________                                    _________________            Employee Name / Signature                                                                                                                            Date

## 2024-04-14 ENCOUNTER — APPOINTMENT (OUTPATIENT)
Dept: RADIOLOGY | Facility: MEDICAL CENTER | Age: 54
DRG: 871 | End: 2024-04-14
Attending: STUDENT IN AN ORGANIZED HEALTH CARE EDUCATION/TRAINING PROGRAM
Payer: COMMERCIAL

## 2024-04-14 LAB
ALBUMIN SERPL BCP-MCNC: 2.2 G/DL (ref 3.2–4.9)
ALBUMIN/GLOB SERPL: 0.8 G/DL
ALP SERPL-CCNC: 67 U/L (ref 30–99)
ALT SERPL-CCNC: 11 U/L (ref 2–50)
ANION GAP SERPL CALC-SCNC: 12 MMOL/L (ref 7–16)
APPEARANCE UR: ABNORMAL
AST SERPL-CCNC: 14 U/L (ref 12–45)
BACTERIA #/AREA URNS HPF: ABNORMAL /HPF
BILIRUB SERPL-MCNC: 0.7 MG/DL (ref 0.1–1.5)
BILIRUB UR QL STRIP.AUTO: NEGATIVE
BUN SERPL-MCNC: 34 MG/DL (ref 8–22)
CALCIUM ALBUM COR SERPL-MCNC: 9.1 MG/DL (ref 8.5–10.5)
CALCIUM SERPL-MCNC: 7.7 MG/DL (ref 8.4–10.2)
CHLORIDE SERPL-SCNC: 103 MMOL/L (ref 96–112)
CO2 SERPL-SCNC: 20 MMOL/L (ref 20–33)
COLOR UR: YELLOW
CREAT SERPL-MCNC: 1.28 MG/DL (ref 0.5–1.4)
EKG IMPRESSION: NORMAL
EPI CELLS #/AREA URNS HPF: ABNORMAL /HPF
ERYTHROCYTE [DISTWIDTH] IN BLOOD BY AUTOMATED COUNT: 44.4 FL (ref 35.9–50)
EST. AVERAGE GLUCOSE BLD GHB EST-MCNC: 120 MG/DL
FLUAV RNA SPEC QL NAA+PROBE: NEGATIVE
FLUBV RNA SPEC QL NAA+PROBE: NEGATIVE
GFR SERPLBLD CREATININE-BSD FMLA CKD-EPI: 66 ML/MIN/1.73 M 2
GLOBULIN SER CALC-MCNC: 2.7 G/DL (ref 1.9–3.5)
GLUCOSE BLD STRIP.AUTO-MCNC: 127 MG/DL (ref 65–99)
GLUCOSE BLD STRIP.AUTO-MCNC: 138 MG/DL (ref 65–99)
GLUCOSE BLD STRIP.AUTO-MCNC: 153 MG/DL (ref 65–99)
GLUCOSE BLD STRIP.AUTO-MCNC: 164 MG/DL (ref 65–99)
GLUCOSE SERPL-MCNC: 123 MG/DL (ref 65–99)
GLUCOSE UR STRIP.AUTO-MCNC: NEGATIVE MG/DL
GRAN CASTS #/AREA URNS LPF: ABNORMAL /LPF
HBA1C MFR BLD: 5.8 % (ref 4–5.6)
HCT VFR BLD AUTO: 39.7 % (ref 42–52)
HGB BLD-MCNC: 13.2 G/DL (ref 14–18)
HYALINE CASTS #/AREA URNS LPF: ABNORMAL /LPF
KETONES UR STRIP.AUTO-MCNC: ABNORMAL MG/DL
LACTATE SERPL-SCNC: 2.1 MMOL/L (ref 0.5–2)
LACTATE SERPL-SCNC: 2.6 MMOL/L (ref 0.5–2)
LEUKOCYTE ESTERASE UR QL STRIP.AUTO: NEGATIVE
MAGNESIUM SERPL-MCNC: 1.6 MG/DL (ref 1.5–2.5)
MCH RBC QN AUTO: 30.1 PG (ref 27–33)
MCHC RBC AUTO-ENTMCNC: 33.2 G/DL (ref 32.3–36.5)
MCV RBC AUTO: 90.6 FL (ref 81.4–97.8)
MICRO URNS: ABNORMAL
MUCOUS THREADS #/AREA URNS HPF: ABNORMAL /HPF
NITRITE UR QL STRIP.AUTO: NEGATIVE
PH UR STRIP.AUTO: 5.5 [PH] (ref 5–8)
PLATELET # BLD AUTO: 211 K/UL (ref 164–446)
PMV BLD AUTO: 10.6 FL (ref 9–12.9)
POTASSIUM SERPL-SCNC: 3.7 MMOL/L (ref 3.6–5.5)
PROT SERPL-MCNC: 4.9 G/DL (ref 6–8.2)
PROT UR QL STRIP: 30 MG/DL
RBC # BLD AUTO: 4.38 M/UL (ref 4.7–6.1)
RBC UR QL AUTO: NEGATIVE
RSV RNA SPEC QL NAA+PROBE: NEGATIVE
SARS-COV-2 RNA RESP QL NAA+PROBE: NOTDETECTED
SODIUM SERPL-SCNC: 135 MMOL/L (ref 135–145)
SP GR UR STRIP.AUTO: 1.02
SPECIMEN SOURCE: NORMAL
WBC # BLD AUTO: 6.8 K/UL (ref 4.8–10.8)
WBC #/AREA URNS HPF: ABNORMAL /HPF

## 2024-04-14 PROCEDURE — 770020 HCHG ROOM/CARE - TELE (206)

## 2024-04-14 PROCEDURE — 0241U HCHG SARS-COV-2 COVID-19 NFCT DS RESP RNA 4 TRGT MIC: CPT

## 2024-04-14 PROCEDURE — 700105 HCHG RX REV CODE 258: Performed by: STUDENT IN AN ORGANIZED HEALTH CARE EDUCATION/TRAINING PROGRAM

## 2024-04-14 PROCEDURE — 80053 COMPREHEN METABOLIC PANEL: CPT

## 2024-04-14 PROCEDURE — 99291 CRITICAL CARE FIRST HOUR: CPT | Performed by: STUDENT IN AN ORGANIZED HEALTH CARE EDUCATION/TRAINING PROGRAM

## 2024-04-14 PROCEDURE — A9270 NON-COVERED ITEM OR SERVICE: HCPCS | Performed by: HOSPITALIST

## 2024-04-14 PROCEDURE — 94760 N-INVAS EAR/PLS OXIMETRY 1: CPT

## 2024-04-14 PROCEDURE — 83735 ASSAY OF MAGNESIUM: CPT

## 2024-04-14 PROCEDURE — 700102 HCHG RX REV CODE 250 W/ 637 OVERRIDE(OP): Performed by: HOSPITALIST

## 2024-04-14 PROCEDURE — 700111 HCHG RX REV CODE 636 W/ 250 OVERRIDE (IP): Performed by: HOSPITALIST

## 2024-04-14 PROCEDURE — 83605 ASSAY OF LACTIC ACID: CPT

## 2024-04-14 PROCEDURE — 82962 GLUCOSE BLOOD TEST: CPT | Mod: 91

## 2024-04-14 PROCEDURE — 93971 EXTREMITY STUDY: CPT | Mod: LT

## 2024-04-14 PROCEDURE — 700105 HCHG RX REV CODE 258: Performed by: HOSPITALIST

## 2024-04-14 PROCEDURE — 700111 HCHG RX REV CODE 636 W/ 250 OVERRIDE (IP): Mod: JZ | Performed by: STUDENT IN AN ORGANIZED HEALTH CARE EDUCATION/TRAINING PROGRAM

## 2024-04-14 PROCEDURE — 85027 COMPLETE CBC AUTOMATED: CPT

## 2024-04-14 RX ADMIN — AMPICILLIN AND SULBACTAM 3 G: 1; 2 INJECTION, POWDER, FOR SOLUTION INTRAMUSCULAR; INTRAVENOUS at 23:48

## 2024-04-14 RX ADMIN — SODIUM CHLORIDE, POTASSIUM CHLORIDE, SODIUM LACTATE AND CALCIUM CHLORIDE: 600; 310; 30; 20 INJECTION, SOLUTION INTRAVENOUS at 17:34

## 2024-04-14 RX ADMIN — INSULIN HUMAN 2 UNITS: 100 INJECTION, SOLUTION PARENTERAL at 07:09

## 2024-04-14 RX ADMIN — OXYCODONE HYDROCHLORIDE 10 MG: 10 TABLET ORAL at 05:43

## 2024-04-14 RX ADMIN — LINEZOLID 600 MG: 600 INJECTION, SOLUTION INTRAVENOUS at 05:45

## 2024-04-14 RX ADMIN — HEPARIN SODIUM 5000 UNITS: 5000 INJECTION, SOLUTION INTRAVENOUS; SUBCUTANEOUS at 05:39

## 2024-04-14 RX ADMIN — OXYCODONE HYDROCHLORIDE 5 MG: 5 TABLET ORAL at 23:44

## 2024-04-14 RX ADMIN — SENNOSIDES AND DOCUSATE SODIUM 2 TABLET: 50; 8.6 TABLET ORAL at 05:40

## 2024-04-14 RX ADMIN — OXYCODONE HYDROCHLORIDE 10 MG: 10 TABLET ORAL at 00:16

## 2024-04-14 RX ADMIN — NICOTINE 14 MG: 14 PATCH, EXTENDED RELEASE TRANSDERMAL at 13:11

## 2024-04-14 RX ADMIN — INSULIN HUMAN 2 UNITS: 100 INJECTION, SOLUTION PARENTERAL at 11:34

## 2024-04-14 RX ADMIN — OXYCODONE HYDROCHLORIDE 5 MG: 5 TABLET ORAL at 13:02

## 2024-04-14 RX ADMIN — AMPICILLIN AND SULBACTAM 3 G: 1; 2 INJECTION, POWDER, FOR SOLUTION INTRAMUSCULAR; INTRAVENOUS at 17:43

## 2024-04-14 RX ADMIN — AMPICILLIN AND SULBACTAM 3 G: 1; 2 INJECTION, POWDER, FOR SOLUTION INTRAMUSCULAR; INTRAVENOUS at 11:39

## 2024-04-14 RX ADMIN — PIPERACILLIN AND TAZOBACTAM 4.5 G: 4; .5 INJECTION, POWDER, FOR SOLUTION INTRAVENOUS at 05:37

## 2024-04-14 RX ADMIN — SODIUM CHLORIDE, POTASSIUM CHLORIDE, SODIUM LACTATE AND CALCIUM CHLORIDE: 600; 310; 30; 20 INJECTION, SOLUTION INTRAVENOUS at 07:09

## 2024-04-14 RX ADMIN — HEPARIN SODIUM 5000 UNITS: 5000 INJECTION, SOLUTION INTRAVENOUS; SUBCUTANEOUS at 21:45

## 2024-04-14 RX ADMIN — HEPARIN SODIUM 5000 UNITS: 5000 INJECTION, SOLUTION INTRAVENOUS; SUBCUTANEOUS at 13:02

## 2024-04-14 RX ADMIN — LINEZOLID 600 MG: 600 INJECTION, SOLUTION INTRAVENOUS at 19:12

## 2024-04-14 RX ADMIN — OXYCODONE HYDROCHLORIDE 5 MG: 5 TABLET ORAL at 17:43

## 2024-04-14 ASSESSMENT — COGNITIVE AND FUNCTIONAL STATUS - GENERAL
SUGGESTED CMS G CODE MODIFIER MOBILITY: CH
SUGGESTED CMS G CODE MODIFIER DAILY ACTIVITY: CH
DAILY ACTIVITIY SCORE: 24
MOBILITY SCORE: 24

## 2024-04-14 ASSESSMENT — PAIN DESCRIPTION - PAIN TYPE
TYPE: ACUTE PAIN
TYPE: ACUTE PAIN
TYPE: ACUTE PAIN;CHRONIC PAIN
TYPE: ACUTE PAIN
TYPE: ACUTE PAIN;CHRONIC PAIN
TYPE: ACUTE PAIN

## 2024-04-14 ASSESSMENT — ENCOUNTER SYMPTOMS
VOMITING: 0
FALLS: 0
EYE REDNESS: 0
SHORTNESS OF BREATH: 0
WEAKNESS: 1
CHILLS: 1
COUGH: 0

## 2024-04-14 ASSESSMENT — FIBROSIS 4 INDEX: FIB4 SCORE: 1.23

## 2024-04-14 NOTE — CONSULTS
"        DATE OF CONSULTATION:  4/14/2024     REFERRING PHYSICIAN:   Dr. Jones in the emergency department    CONSULTING PHYSICIAN:  Isaac Love D.O.       HISTORY OF PRESENT ILLNESS: The patient is a 53-year-old male smoker who came to the emergency department for evaluation of malaise fatigue fevers nausea vomiting and left lower extremity pain and swelling.  Was found to have fairly to the significant soft tissue infection of the left leg but CT scan does not endorse soft tissue gas or abscess.  Patient's feels a little bit better since he has been receiving resuscitation and antibiotics and feels that the swelling in the leg is getting better.    PAST MEDICAL HISTORY:  has a past medical history of Patient denies medical problems.    PAST SURGICAL HISTORY:  has no past surgical history on file.    ALLERGIES: No Known Allergies    CURRENT MEDICATIONS:   Home Medications       Reviewed by Jg Robert (Pharmacy Tech) on 04/13/24 at 1654  Med List Status: Complete     Medication Last Dose Status   ibuprofen (MOTRIN) 200 MG Tab 4/13/2024 Active                  FAMILY HISTORY: family history is not on file.    SOCIAL HISTORY:  reports that he has been smoking cigarettes. He has never used smokeless tobacco. He reports current alcohol use. He reports that he does not use drugs.    REVIEW OF SYSTEMS: Review of systems is remarkable for the following body aches fever, nausea and vomiting, left thigh pain and swelling, poor appetite. The remainder of the comprehensive ROS is negative with the exception of the aforementioned HPI, PMH, and PSH bullets in accordance with CMS guideline.    PHYSICAL EXAMINATION:      Vital Signs: /54   Pulse 95   Temp 36.7 °C (98 °F) (Temporal)   Resp (!) 21   Ht 1.803 m (5' 10.98\")   Wt 79.9 kg (176 lb 2.4 oz)   SpO2 94%   Physical Exam  Vitals and nursing note reviewed.   HENT:      Head: Normocephalic and atraumatic.      Nose: Nose normal.      Mouth/Throat:      " Mouth: Mucous membranes are dry.      Pharynx: Oropharynx is clear.   Eyes:      Extraocular Movements: Extraocular movements intact.      Conjunctiva/sclera: Conjunctivae normal.   Cardiovascular:      Rate and Rhythm: Normal rate and regular rhythm.      Pulses: Normal pulses.   Pulmonary:      Effort: Pulmonary effort is normal. No respiratory distress.      Breath sounds: No stridor.   Abdominal:      General: There is no distension.      Palpations: Abdomen is soft.      Tenderness: There is no abdominal tenderness.   Musculoskeletal:      Cervical back: Normal range of motion and neck supple.      Comments: Left medial thigh swelling and soft tissue edema without crepitus or area of fluctuance.  Corresponding decreased range of motion of the left knee.  There is mild blanching erythema that appears to be improved since in the emergency department.    Otherwise no concerning findings or restriction of range of motion in the remainder of the extremities   Skin:     General: Skin is warm and dry.      Coloration: Skin is not pale.   Neurological:      General: No focal deficit present.      Mental Status: He is alert and oriented to person, place, and time.         LABORATORY VALUES:   Recent Labs     04/13/24  1619 04/14/24  0415   WBC 15.0* 6.8   RBC 5.18 4.38*   HEMOGLOBIN 15.5 13.2*   HEMATOCRIT 46.7 39.7*   MCV 90.2 90.6   MCH 29.9 30.1   MCHC 33.2 33.2   RDW 43.8 44.4   PLATELETCT 286 211   MPV 10.3 10.6     Recent Labs     04/13/24  1619 04/14/24  0415   SODIUM 134* 135   POTASSIUM 4.2 3.7   CHLORIDE 95* 103   CO2 20 20   GLUCOSE 273* 123*   BUN 46* 34*   CREATININE 2.60* 1.28   CALCIUM 8.7 7.7*     Recent Labs     04/13/24  1619 04/14/24  0415   ASTSGOT 16 14   ALTSGPT 11 11   TBILIRUBIN 0.6 0.7   ALKPHOSPHAT 112* 67   GLOBULIN 3.3 2.7            IMAGING:   DX-CHEST-FOR LINE PLACEMENT Perform procedure in: Patient's Room   Final Result      1.  Interval insertion of a central venous catheter which  terminates with the tip projecting over the expected region of the mid to distal superior vena cava.   2.  No acute cardiopulmonary.      CT-EXTREMITY, LOWER W/O LEFT   Final Result      1.  Left lower extremity edema versus cellulitis. No drainable fluid collections on this noncontrast study.   2.  Enlarged left external iliac and inguinal lymph nodes. They may be reactive to the left lower extremity infection, however, neoplastic nodes are difficult to rule out. Close clinical follow-up is needed.      DX-CHEST-PORTABLE (1 VIEW)   Final Result      No acute cardiopulmonary abnormality.             ASSESSMENT AND PLAN:   53-year-old male with sepsis and left lower extremity cellulitis that seems to be improving with antibiotic therapy and resuscitation.  I reviewed the CT scan and there is no findings that would suggest potential necrotizing soft tissue infection or abscess.  I do not think that acute surgical intervention is indicated at this time.    Continue medical management.  Please call the surgical service if there is any question or concern      Sepsis (HCC)  This is Sepsis Present on admission  SIRS criteria identified on my evaluation include: Tachycardia, with heart rate greater than 90 BPM, Leukocytosis, with WBC greater than 12,000, and Bandemia, greater than 10% bands  Clinical indicators of end organ dysfunction include Hypotension with systolic blood pressure less than 90 or MAP less than 65, Hypotension with decrease in systolic blood pressure of more than 40mmHg, and Lactic Acid greater than 2, acute kidney injury  Source is cellulitis  Sepsis protocol initiated  Crystalloid Fluid Administration: Fluid resuscitation ordered per standard protocol - 30 mL/kg per current or ideal body weight  IV antibiotics as appropriate for source of sepsis  Reassessment: I have reassessed the patient's hemodynamic status    Septic shock (HCC)  This is Sepsis Present on admission  SIRS criteria identified on my  evaluation include: Tachycardia, with heart rate greater than 90 BPM, Leukocytosis, with WBC greater than 12,000, and Bandemia, greater than 10% bands  Clinical indicators of end organ dysfunction include Hypotension with systolic blood pressure less than 90 or MAP less than 65, Hypotension with decrease in systolic blood pressure of more than 40mmHg, and Lactic Acid greater than 2  Source is cellulitis  Sepsis protocol initiated  Crystalloid Fluid Administration: Fluid resuscitation ordered per standard protocol - 30 mL/kg per current or ideal body weight  IV antibiotics as appropriate for source of sepsis  Reassessment: I have reassessed the patient's hemodynamic status   I will start on Levophed    Cellulitis of left lower extremity  I will start Zyvox and Zosyn, follow on cultures and sensitivities  General surgery will be consulted    Hyperglycemia  With marked hyperglycemia  I will check a glycated hemoglobin    I will start short acting insulin for now  I will order Accu-Checks, hypoglycemia protocol  Adjust according to blood sugars trend.     Acute kidney injury (HCC)  Mostly due to dehydration and sepsis  I will start intravenous fluids  Avoid / minimize nephrotoxins as much as possible.  Monitor inputs and outputs     Dehydration  Likely secondary to reduced oral intake, and increase in insensible loss due to infection, and sepsis.  Encourage oral intake as tolerated, antiemetics as needed.  Intravenous hydration until adequate oral intake is achieved.     Hyponatremia  Hypovolemic hyponatremia   I expect Na to improve with IVF     Metabolic acidosis  Likely due to reduced intravascular volume and secondary to sepsis.  I will start intravenous fluids    Anticipate improvement with intravenous fluids  Continue to monitor, I will order follow-up bicarb level        Tobacco dependence  I spent 4 minutes on Tobacco cessation education and counseling.   I Discussed the benefits of quitting smoking and risks of  continued smoking including cardiovascular disease, cancer and COPD.   Discussed the option of nicotine patch, and nicotine gum           ____________________________________     Isaac Love D.O.    DD: 4/14/2024  9:48 AM

## 2024-04-14 NOTE — ASSESSMENT & PLAN NOTE
WBC count down to 10.7, drainage minimal at the left knee  Transition to Augmentin, discussed with infectious disease

## 2024-04-14 NOTE — PROGRESS NOTES
12-hour chart check complete.    Monitor Summary  Rhythm: SR/ST  Rate: 90s-100s  Ectopy: NA  Measurements: .16/.10/.34

## 2024-04-14 NOTE — ASSESSMENT & PLAN NOTE
This is Sepsis Present on admission  SIRS criteria identified on my evaluation include: Tachycardia, with heart rate greater than 90 BPM, Leukocytosis, with WBC greater than 12,000, and Bandemia, greater than 10% bands  Clinical indicators of end organ dysfunction include Hypotension with systolic blood pressure less than 90 or MAP less than 65, Hypotension with decrease in systolic blood pressure of more than 40mmHg, and Lactic Acid greater than 2, acute kidney injury  Source is cellulitis  Sepsis protocol initiated  Crystalloid Fluid Administration: Fluid resuscitation ordered per standard protocol - 30 mL/kg per current or ideal body weight  IV antibiotics as appropriate for source of sepsis  Reassessment: I have reassessed the patient's hemodynamic status

## 2024-04-14 NOTE — PROGRESS NOTES
Critical Care Progress Note    Date of admission  4/13/2024    Chief Complaint/Hospital Course  53 y.o. male with history of tobacco use admitted 4/13/2024 with LLE pain, swelling and general weakness. Thought he might've had a spider bit on that leg a few days prior and then started having worsening pain, swelling and then generalized weakness, chills so he came to ER. In ER, he was tachy to 100s, hypotensive with SBP 80/50s and elevated lactate 4.3 -> given IVFs and started on levophed and antibiotics and admitted to ICU.       Interval Problem Update  Reviewed last 24 hour events:    Cardiac: HR 90s, BP 100s off levo  Pulm: RA  Heme: wbc down to 13.2, no e/o bleeding   /renal: Cr improving with IVFs  GI/endo: po diet  ID:  on linezolid and zosyn. Bcx GPCs   I/O: +5L  Additional Labs/Imaging:   - CT LLE w/o fluid collection     Review of Systems  Review of Systems   Constitutional:  Positive for chills and malaise/fatigue.   Eyes:  Negative for redness.   Respiratory:  Negative for cough and shortness of breath.    Cardiovascular:  Negative for chest pain.   Gastrointestinal:  Negative for vomiting.   Musculoskeletal:  Negative for falls.   Neurological:  Positive for weakness.        Vital Signs for last 24 hours   Temp:  [36.1 °C (96.9 °F)-37.1 °C (98.8 °F)] 36.7 °C (98 °F)  Pulse:  [] 95  Resp:  [15-29] 21  BP: ()/(51-78) 100/54  SpO2:  [56 %-100 %] 94 %    Hemodynamic parameters for last 24 hours       Respiratory Information for the last 24 hours       Physical Exam   Physical Exam  Vitals and nursing note reviewed.   Constitutional:       General: He is not in acute distress.  HENT:      Head: Normocephalic.      Mouth/Throat:      Mouth: Mucous membranes are moist.   Eyes:      Conjunctiva/sclera: Conjunctivae normal.   Cardiovascular:      Rate and Rhythm: Normal rate and regular rhythm.   Pulmonary:      Effort: Pulmonary effort is normal. No respiratory distress.   Abdominal:       Palpations: Abdomen is soft.   Skin:     Comments: LLE erythema, TTP   Neurological:      Mental Status: He is alert. Mental status is at baseline.   Psychiatric:         Mood and Affect: Mood normal.         Medications  Current Facility-Administered Medications   Medication Dose Route Frequency Provider Last Rate Last Admin    piperacillin-tazobactam (Zosyn) 4.5 g in  mL IVPB  4.5 g Intravenous Q8HRS Darrell Hayes M.D.   Stopped at 04/14/24 0937    Linezolid (Zyvox) premix 600 mg  600 mg Intravenous Q12HRS Darrell Hayes M.D.   Stopped at 04/14/24 0645    acetaminophen (Tylenol) tablet 650 mg  650 mg Oral Q6HRS PRN Darrell Hayes M.D.        senna-docusate (Pericolace Or Senokot S) 8.6-50 MG per tablet 2 Tablet  2 Tablet Oral BID Darrell Hayes M.D.   2 Tablet at 04/14/24 0540    And    polyethylene glycol/lytes (Miralax) Packet 1 Packet  1 Packet Oral QDAY PRN Darrell Hayes M.D.        LR (Bolus) infusion 500 mL  500 mL Intravenous Once PRN Darrell Hayes M.D.        lactated ringers infusion   Intravenous Continuous Darrell Hayes M.D. 125 mL/hr at 04/14/24 0709 New Bag at 04/14/24 0709    Pharmacy Consult Request ...Pain Management Review 1 Each  1 Each Other PHARMACY TO DOSE Darrell Hayes M.D.        oxyCODONE immediate-release (Roxicodone) tablet 5 mg  5 mg Oral Q3HRS PRN Darrell Hayes M.D.        Or    oxyCODONE immediate release (Roxicodone) tablet 10 mg  10 mg Oral Q3HRS PRN Darrell Hayes M.D.   10 mg at 04/14/24 0543    Or    HYDROmorphone (Dilaudid) injection 0.5 mg  0.5 mg Intravenous Q3HRS PRN Darrell Hayes M.D.   0.5 mg at 04/13/24 2049    ondansetron (Zofran) syringe/vial injection 4 mg  4 mg Intravenous Q4HRS PRN Darrell Hayes M.D.        ondansetron (Zofran ODT) dispertab 4 mg  4 mg Oral Q4HRS PRN Darrell Hayes M.D.        promethazine (Phenergan) tablet 12.5-25 mg  12.5-25 mg Oral Q4HRS PRN Darrell Hayes M.D.        promethazine (Phenergan) suppository  12.5-25 mg  12.5-25 mg Rectal Q4HRS PRN Asejitendra Hayes M.D.        prochlorperazine (Compazine) injection 5-10 mg  5-10 mg Intravenous Q4HRS PRN Asejitendra Hayes M.D.        insulin regular (HumuLIN R,NovoLIN R) injection  2-9 Units Subcutaneous 4X/DAY ACHS Asejitendra Hayes M.D.   2 Units at 04/14/24 0709    And    dextrose 10 % BOLUS 25 g  25 g Intravenous Q15 MIN PRN Asem SLADE Hayes M.D.        heparin injection 5,000 Units  5,000 Units Subcutaneous Q8HRS Asejitendra Hayes M.D.   5,000 Units at 04/14/24 0539    nicotine (Nicoderm) 14 MG/24HR 14 mg  14 mg Transdermal Daily-0600 Asejitendra Hayes M.D.        And    nicotine polacrilex (Nicorette) 2 MG piece 2 mg  2 mg Oral Q HOUR PRN Asejitendra Hayes M.D.        norepinephrine (Levophed) 8 mg in 250 mL NS infusion (premix)  0-1 mcg/kg/min (Ideal) Intravenous Continuous Asejitendra Hayes M.D.   Continue to Floor at 04/13/24 2150       Fluids    Intake/Output Summary (Last 24 hours) at 4/14/2024 0917  Last data filed at 4/14/2024 0900  Gross per 24 hour   Intake 6076.4 ml   Output 650 ml   Net 5426.4 ml       Laboratory          Recent Labs     04/13/24 1619 04/14/24 0415   SODIUM 134* 135   POTASSIUM 4.2 3.7   CHLORIDE 95* 103   CO2 20 20   BUN 46* 34*   CREATININE 2.60* 1.28   MAGNESIUM  --  1.6   CALCIUM 8.7 7.7*     Recent Labs     04/13/24  1619 04/14/24  0415   ALTSGPT 11 11   ASTSGOT 16 14   ALKPHOSPHAT 112* 67   TBILIRUBIN 0.6 0.7   GLUCOSE 273* 123*     Recent Labs     04/13/24  1619 04/14/24  0415   WBC 15.0* 6.8   NEUTSPOLYS 59.00  --    LYMPHOCYTES 3.00*  --    MONOCYTES 2.00  --    EOSINOPHILS 0.00  --    BASOPHILS 0.00  --    ASTSGOT 16 14   ALTSGPT 11 11   ALKPHOSPHAT 112* 67   TBILIRUBIN 0.6 0.7     Recent Labs     04/13/24  1619 04/14/24  0415   RBC 5.18 4.38*   HEMOGLOBIN 15.5 13.2*   HEMATOCRIT 46.7 39.7*   PLATELETCT 286 211       Imaging  Reviewed     Assessment/Plan    Septic shock  LLE cellulitis  Shock resolved with IVFs - needed levo  transiently.   - f/u blood cultures - prelim with GPCs, c/f strep  - continue linezolid and narrow zosyn to unasyn for now - covers for TSS and we can narrow even further (and dc linezolid) once we get speciation  - continue maintenance fluids for today  - IVF boluses as needed  - PO diet  - wound care  - f/u LLE US to eval for DVT    ANDRE  2/2 sepsis and hypovolemia - improved with IVFs  - continue maintenance fluids for today  - IVF boluses as needed  - monitor renal function  - monitor/replete lytes  - avoid nephrotoxic agents    Hyperglycemia  No formal diagnosis of DM  - f/u [id number]  - monitor  - ISS      VTE:  Heparin  Ulcer: Not Indicated  Lines: Central Line  Ongoing indication addressed    I have performed a physical exam and reviewed and updated ROS and Plan today (4/14/2024). In review of yesterday's note (4/13/2024), there are no changes except as documented above.     Discussed patient condition and risk of morbidity and/or mortality with RN, RT, Pharmacy, Charge nurse / hot rounds, and Patient  The patient remains critically ill.  Critical care time = 45 minutes in directly providing and coordinating critical care and extensive data review.  No time overlap and excludes procedures.        Nargis Hanley MD  Pulmonary and Critical Care Medicine  [facility]

## 2024-04-14 NOTE — ED NOTES
Sepsis Bolus and Abx infusing per MAR.  Pt continues to c/o pain 7/10.  Pt remains hypotensive. Report to Jada

## 2024-04-14 NOTE — H&P
"Hospital Medicine History & Physical Note    Date of Service  4/13/2024    Primary Care Physician  Pcp Not In Computer     Consultants  General surgery, Dr Love      Code Status  Full Code    Chief Complaint  Chief Complaint   Patient presents with    Leg Swelling     L leg x1 week. Denies CP or SOB. Denies wound or rash. Endorses nausea.    Body Aches     Reports Monday, feeling that he had h/a, body aches, nausea. Reports improvement Tuesday but states \"then I couldn't hold food down\". Reports N/V persists.      History of Presenting Illness  Oneal Tillman is a 53 y.o. male with a past medical history of tobacco dependence and likely untreated diabetes who presented 4/13/2024 with generalized weakness, worsening left lower extremity pain redness and swelling.  Patient report that he might have had a spider bite on his leg after changing his pants.  2 days later he started to have progressively worsening pain, redness and swelling.  Over the past 2-3 days he also noticed chills palpitations and generalized weakness.  In the emergency room the patient was found to be tachycardic with a heart rate of 104.  His pressures were low with a systolic of 82 over diastolic of 53.  His lactic acid was elevated at 4.3 and his white count was 15.  His white count also reflect bandemia of 21%.     I discussed the plan of care with emergency physician, and the patient.    Review of Systems  Review of Systems   Constitutional:  Positive for chills, fever and malaise/fatigue.   Eyes:  Negative for discharge and redness.   Respiratory:  Negative for cough, shortness of breath and stridor.    Cardiovascular:  Positive for palpitations. Negative for chest pain and leg swelling.   Gastrointestinal:  Negative for abdominal pain and vomiting.   Genitourinary:  Negative for flank pain.   Musculoskeletal:         Redness, pain and swelling of the left lower extremity   Skin: Negative.    Neurological:  Negative for focal weakness. "   Endo/Heme/Allergies:  Does not bruise/bleed easily.   Psychiatric/Behavioral:  The patient is not nervous/anxious.      Past Medical History   has a past medical history of Patient denies medical problems.    Surgical History   has no past surgical history on file.     Family History  family history is not on file.      Social History   reports that he has been smoking cigarettes. He has never used smokeless tobacco. He reports current alcohol use. He reports that he does not use drugs.    Allergies  No Known Allergies    Medications  Prior to Admission Medications   Prescriptions Last Dose Informant Patient Reported? Taking?   ibuprofen (MOTRIN) 200 MG Tab 4/13/2024 at 1300 Patient Yes Yes   Sig: Take 400-600 mg by mouth every 6 hours as needed for Mild Pain.      Facility-Administered Medications: None     Physical Exam  Temp:  [36.1 °C (96.9 °F)-36.8 °C (98.3 °F)] 36.1 °C (96.9 °F)  Pulse:  [] 102  Resp:  [20] 20  BP: ()/(51-80) 83/55  SpO2:  [92 %-100 %] 96 %  Blood Pressure: 102/59   Temperature: 36.1 °C (96.9 °F)   Pulse: 87   Respiration: 20   Pulse Oximetry: 97 %     Physical Exam  Constitutional:       General: He is not in acute distress.     Appearance: He is ill-appearing and diaphoretic.   HENT:      Head: Atraumatic.      Right Ear: External ear normal.      Left Ear: External ear normal.      Nose: No congestion or rhinorrhea.      Mouth/Throat:      Mouth: Mucous membranes are dry.   Eyes:      General: No scleral icterus.        Right eye: No discharge.         Left eye: No discharge.      Pupils: Pupils are equal, round, and reactive to light.   Cardiovascular:      Rate and Rhythm: Regular rhythm. Tachycardia present.   Pulmonary:      Effort: Pulmonary effort is normal.   Abdominal:      General: There is no distension.   Musculoskeletal:         General: Swelling and tenderness present.      Cervical back: Neck supple. No rigidity. No muscular tenderness.      Left lower leg: Edema  "present.      Comments: Erythema and edema and tenderness of the left thigh and leg.   Skin:     Coloration: Skin is not jaundiced or pale.   Neurological:      Mental Status: He is alert and oriented to person, place, and time.      Coordination: Coordination normal.   Psychiatric:         Mood and Affect: Mood normal.         Behavior: Behavior normal.       Laboratory:  Recent Labs     04/13/24  1619   WBC 15.0*   RBC 5.18   HEMOGLOBIN 15.5   HEMATOCRIT 46.7   MCV 90.2   MCH 29.9   MCHC 33.2   RDW 43.8   PLATELETCT 286   MPV 10.3     Recent Labs     04/13/24  1619   SODIUM 134*   POTASSIUM 4.2   CHLORIDE 95*   CO2 20   GLUCOSE 273*   BUN 46*   CREATININE 2.60*   CALCIUM 8.7     Recent Labs     04/13/24  1619   ALTSGPT 11   ASTSGOT 16   ALKPHOSPHAT 112*   TBILIRUBIN 0.6   GLUCOSE 273*         No results for input(s): \"NTPROBNP\" in the last 72 hours.      No results for input(s): \"TROPONINT\" in the last 72 hours.    Imaging:  CT-EXTREMITY, LOWER W/O LEFT   Final Result      1.  Left lower extremity edema versus cellulitis. No drainable fluid collections on this noncontrast study.   2.  Enlarged left external iliac and inguinal lymph nodes. They may be reactive to the left lower extremity infection, however, neoplastic nodes are difficult to rule out. Close clinical follow-up is needed.      DX-CHEST-PORTABLE (1 VIEW)   Final Result      No acute cardiopulmonary abnormality.           Assessment/Plan:  Justification for Admission Status  I anticipate this patient will require at least two midnights for appropriate medical management, necessitating inpatient admission because patient has cellulitis leading to sepsis.  Will require intravenous fluids and intravenous antibiotics.    Patient will need a  bed on intensive care unit.  The patient has sepsis with shock.    * Septic shock (HCC)- (present on admission)  Assessment & Plan  This is Sepsis Present on admission  SIRS criteria identified on my evaluation include: " Tachycardia, with heart rate greater than 90 BPM, Leukocytosis, with WBC greater than 12,000, and Bandemia, greater than 10% bands  Clinical indicators of end organ dysfunction include Hypotension with systolic blood pressure less than 90 or MAP less than 65, Hypotension with decrease in systolic blood pressure of more than 40mmHg, and Lactic Acid greater than 2  Source is cellulitis  Sepsis protocol initiated  Crystalloid Fluid Administration: Fluid resuscitation ordered per standard protocol - 30 mL/kg per current or ideal body weight  IV antibiotics as appropriate for source of sepsis  Reassessment: I have reassessed the patient's hemodynamic status   I will start on Levophed    Acute kidney injury (HCC)- (present on admission)  Assessment & Plan  Mostly due to dehydration and sepsis  I will start intravenous fluids  Avoid / minimize nephrotoxins as much as possible.  Monitor inputs and outputs     Cellulitis of left lower extremity- (present on admission)  Assessment & Plan  I will start Zyvox and Zosyn, follow on cultures and sensitivities  General surgery will be consulted    Sepsis ([facility])- (present on admission)  Assessment & Plan  This is Sepsis Present on admission  SIRS criteria identified on my evaluation include: Tachycardia, with heart rate greater than 90 BPM, Leukocytosis, with WBC greater than 12,000, and Bandemia, greater than 10% bands  Clinical indicators of end organ dysfunction include Hypotension with systolic blood pressure less than 90 or MAP less than 65, Hypotension with decrease in systolic blood pressure of more than 40mmHg, and Lactic Acid greater than 2, acute kidney injury  Source is cellulitis  Sepsis protocol initiated  Crystalloid Fluid Administration: Fluid resuscitation ordered per standard protocol - 30 mL/kg per current or ideal body weight  IV antibiotics as appropriate for source of sepsis  Reassessment: I have reassessed the patient's hemodynamic status    Metabolic acidosis-  (present on admission)  Assessment & Plan  Likely due to reduced intravascular volume and secondary to sepsis.  I will start intravenous fluids    Anticipate improvement with intravenous fluids  Continue to monitor, I will order follow-up bicarb level        Hyponatremia- (present on admission)  Assessment & Plan  Hypovolemic hyponatremia   I expect Na to improve with IVF     Dehydration- (present on admission)  Assessment & Plan  Likely secondary to reduced oral intake, and increase in insensible loss due to infection, and sepsis.  Encourage oral intake as tolerated, antiemetics as needed.  Intravenous hydration until adequate oral intake is achieved.     Tobacco dependence- (present on admission)  Assessment & Plan  I spent 4 minutes on Tobacco cessation education and counseling.   I Discussed the benefits of quitting smoking and risks of continued smoking including cardiovascular disease, cancer and COPD.   Discussed the option of nicotine patch, and nicotine gum    Hyperglycemia- (present on admission)  Assessment & Plan  With marked hyperglycemia  I will check a glycated hemoglobin    I will start short acting insulin for now  I will order Accu-Checks, hypoglycemia protocol  Adjust according to blood sugars trend.       VTE prophylaxis: SCDs/TEDs and heparin ppx    Patient is critically ill.   The patient continues to have: Hypotension  The vital organ system that is affected is the: Cardiovascular, renal, If untreated there is a high chance of deterioration into: Multiorgan failure, and eventually death   The critical care that I am providing today is: Titratable Levophed drip  The critical that has been undertaken is medically complex.   There has been no overlap in critical care time.   Critical Care Time not including procedures: 75 minutes.

## 2024-04-14 NOTE — ASSESSMENT & PLAN NOTE
I discussed with surgery, continue to monitor at this time and complete antibiotics.  No surgery.  Wound care consult has been placed and pending  - I discussed with general surgery, he agreed improving on walking will help the abscess to drain  Decreased drainage from the left knee, milked the both superior and inferior aspect to the wound and no increase in drainage.  No purulence, no foul smell.  Discussed with infectious disease and okay to transition to oral regimen, will monitor for an additional 24 hours to make sure the WBC count does not increase exponentially and to make sure that the drainage does not worsen and anticipate patient should be able to discharge home in the next 24 to 48 hours.

## 2024-04-14 NOTE — CARE PLAN
The patient is Watcher - Medium risk of patient condition declining or worsening    Shift Goals  Clinical Goals: monitor vitals, pain control  Patient Goals: comfort, rest  Family Goals: AHSAN    Progress made toward(s) clinical / shift goals:      Problem: Pain - Standard  Goal: Alleviation of pain or a reduction in pain to the patient’s comfort goal  Outcome: Progressing     Problem: Hemodynamics  Goal: Patient's hemodynamics, fluid balance and neurologic status will be stable or improve  Outcome: Progressing     Problem: Fluid Volume  Goal: Fluid volume balance will be maintained  Outcome: Progressing     Problem: Physical Regulation  Goal: Signs and symptoms of infection will decrease  Outcome: Progressing       Patient is not progressing towards the following goals:

## 2024-04-14 NOTE — PROGRESS NOTES
4 Eyes Skin Assessment Completed by BRAYDEN Cruz and BRAYDEN Lee.    Head WDL  Ears WDL  Nose WDL  Mouth WDL  Neck Redness  Breast/Chest WDL  Shoulder Blades WDL  Spine WDL  (R) Arm/Elbow/Hand WDL  (L) Arm/Elbow/Hand WDL  Abdomen WDL  Groin WDL  Scrotum/Coccyx/Buttocks WDL  (R) Leg WDL  (L) Leg Redness, Rash, Scab, Swelling, Shiny, and Edema  (R) Heel/Foot/Toe WDL  (L) Heel/Foot/Toe WDL          Devices In Places ECG, Blood pressure cuff, Pulse Ox, and Central Line     Interventions In Place TAP System, Pillows, and Low Air Loss Mattress    Possible Skin Injury No    Pictures Uploaded Into Epic N/A  Wound Consult Placed N/A  RN Wound Prevention Protocol Ordered No

## 2024-04-14 NOTE — CARE PLAN
The patient is Stable - Low risk of patient condition declining or worsening    Shift Goals  Clinical Goals: SBP>90  Patient Goals: comfort  Family Goals: AHSAN    Progress made toward(s) clinical / shift goals:      Patient is not progressing towards the following goals:

## 2024-04-15 ENCOUNTER — APPOINTMENT (OUTPATIENT)
Dept: CARDIOLOGY | Facility: MEDICAL CENTER | Age: 54
DRG: 871 | End: 2024-04-15
Attending: INTERNAL MEDICINE
Payer: COMMERCIAL

## 2024-04-15 PROBLEM — B95.5 BACTEREMIA DUE TO STREPTOCOCCUS: Status: ACTIVE | Noted: 2024-04-15

## 2024-04-15 PROBLEM — E87.6 HYPOKALEMIA: Status: ACTIVE | Noted: 2024-04-15

## 2024-04-15 PROBLEM — R78.81 BACTEREMIA DUE TO STREPTOCOCCUS: Status: ACTIVE | Noted: 2024-04-15

## 2024-04-15 PROBLEM — E83.39 HYPOPHOSPHATEMIA: Status: ACTIVE | Noted: 2024-04-15

## 2024-04-15 LAB
ALBUMIN SERPL BCP-MCNC: 1.8 G/DL (ref 3.2–4.9)
AMPHET UR QL SCN: NEGATIVE
ANISOCYTOSIS BLD QL SMEAR: ABNORMAL
BARBITURATES UR QL SCN: NEGATIVE
BASOPHILS # BLD AUTO: 0 % (ref 0–1.8)
BASOPHILS # BLD: 0 K/UL (ref 0–0.12)
BENZODIAZ UR QL SCN: NEGATIVE
BUN SERPL-MCNC: 15 MG/DL (ref 8–22)
BZE UR QL SCN: NEGATIVE
CALCIUM ALBUM COR SERPL-MCNC: 9.7 MG/DL (ref 8.5–10.5)
CALCIUM SERPL-MCNC: 7.9 MG/DL (ref 8.4–10.2)
CANNABINOIDS UR QL SCN: NEGATIVE
CHLORIDE SERPL-SCNC: 101 MMOL/L (ref 96–112)
CO2 SERPL-SCNC: 23 MMOL/L (ref 20–33)
CREAT SERPL-MCNC: 0.77 MG/DL (ref 0.5–1.4)
EOSINOPHIL # BLD AUTO: 0 K/UL (ref 0–0.51)
EOSINOPHIL NFR BLD: 0 % (ref 0–6.9)
ERYTHROCYTE [DISTWIDTH] IN BLOOD BY AUTOMATED COUNT: 43.5 FL (ref 35.9–50)
FENTANYL UR QL: NEGATIVE
GFR SERPLBLD CREATININE-BSD FMLA CKD-EPI: 106 ML/MIN/1.73 M 2
GLUCOSE BLD STRIP.AUTO-MCNC: 121 MG/DL (ref 65–99)
GLUCOSE BLD STRIP.AUTO-MCNC: 135 MG/DL (ref 65–99)
GLUCOSE BLD STRIP.AUTO-MCNC: 141 MG/DL (ref 65–99)
GLUCOSE BLD STRIP.AUTO-MCNC: 144 MG/DL (ref 65–99)
GLUCOSE SERPL-MCNC: 111 MG/DL (ref 65–99)
HCT VFR BLD AUTO: 35.9 % (ref 42–52)
HGB BLD-MCNC: 12.2 G/DL (ref 14–18)
LACTATE SERPL-SCNC: 2 MMOL/L (ref 0.5–2)
LG PLATELETS BLD QL SMEAR: NORMAL
LV EJECT FRACT  99904: 65
LV EJECT FRACT MOD 2C 99903: 60.18
LV EJECT FRACT MOD 4C 99902: 71.07
LV EJECT FRACT MOD BP 99901: 66.19
LYMPHOCYTES # BLD AUTO: 1.53 K/UL (ref 1–4.8)
LYMPHOCYTES NFR BLD: 7 % (ref 22–41)
MACROCYTES BLD QL SMEAR: ABNORMAL
MAGNESIUM SERPL-MCNC: 2.1 MG/DL (ref 1.5–2.5)
MANUAL DIFF BLD: ABNORMAL
MCH RBC QN AUTO: 30.3 PG (ref 27–33)
MCHC RBC AUTO-ENTMCNC: 34 G/DL (ref 32.3–36.5)
MCV RBC AUTO: 89.3 FL (ref 81.4–97.8)
METAMYELOCYTES NFR BLD MANUAL: 1 %
METHADONE UR QL SCN: NEGATIVE
MONOCYTES # BLD AUTO: 0.44 K/UL (ref 0–0.85)
MONOCYTES NFR BLD AUTO: 2 % (ref 0–13.4)
NEUTROPHILS # BLD AUTO: 19.62 K/UL (ref 1.82–7.42)
NEUTROPHILS NFR BLD: 71 % (ref 44–72)
NEUTS BAND NFR BLD MANUAL: 19 % (ref 0–10)
NRBC # BLD AUTO: 0 K/UL
NRBC BLD-RTO: 0 /100 WBC (ref 0–0.2)
OPIATES UR QL SCN: NEGATIVE
OXYCODONE UR QL SCN: POSITIVE
PCP UR QL SCN: NEGATIVE
PHOSPHATE SERPL-MCNC: 1.6 MG/DL (ref 2.5–4.5)
PLATELET # BLD AUTO: 217 K/UL (ref 164–446)
PLATELET BLD QL SMEAR: NORMAL
PMV BLD AUTO: 10.7 FL (ref 9–12.9)
POLYCHROMASIA BLD QL SMEAR: NORMAL
POTASSIUM SERPL-SCNC: 3.2 MMOL/L (ref 3.6–5.5)
PROCALCITONIN SERPL-MCNC: 4.71 NG/ML
PROPOXYPH UR QL SCN: NEGATIVE
RBC # BLD AUTO: 4.02 M/UL (ref 4.7–6.1)
RBC BLD AUTO: PRESENT
SODIUM SERPL-SCNC: 136 MMOL/L (ref 135–145)
TOXIC GRANULES BLD QL SMEAR: NORMAL
VARIANT LYMPHS BLD QL SMEAR: NORMAL
WBC # BLD AUTO: 21.8 K/UL (ref 4.8–10.8)

## 2024-04-15 PROCEDURE — 700105 HCHG RX REV CODE 258: Performed by: INTERNAL MEDICINE

## 2024-04-15 PROCEDURE — 93306 TTE W/DOPPLER COMPLETE: CPT

## 2024-04-15 PROCEDURE — 82962 GLUCOSE BLOOD TEST: CPT

## 2024-04-15 PROCEDURE — 700105 HCHG RX REV CODE 258: Performed by: STUDENT IN AN ORGANIZED HEALTH CARE EDUCATION/TRAINING PROGRAM

## 2024-04-15 PROCEDURE — A9270 NON-COVERED ITEM OR SERVICE: HCPCS | Performed by: INTERNAL MEDICINE

## 2024-04-15 PROCEDURE — 85027 COMPLETE CBC AUTOMATED: CPT

## 2024-04-15 PROCEDURE — 83735 ASSAY OF MAGNESIUM: CPT

## 2024-04-15 PROCEDURE — 700101 HCHG RX REV CODE 250: Performed by: INTERNAL MEDICINE

## 2024-04-15 PROCEDURE — 700111 HCHG RX REV CODE 636 W/ 250 OVERRIDE (IP): Mod: JZ | Performed by: HOSPITALIST

## 2024-04-15 PROCEDURE — 700111 HCHG RX REV CODE 636 W/ 250 OVERRIDE (IP): Mod: JZ | Performed by: STUDENT IN AN ORGANIZED HEALTH CARE EDUCATION/TRAINING PROGRAM

## 2024-04-15 PROCEDURE — 87040 BLOOD CULTURE FOR BACTERIA: CPT

## 2024-04-15 PROCEDURE — 700102 HCHG RX REV CODE 250 W/ 637 OVERRIDE(OP): Performed by: INTERNAL MEDICINE

## 2024-04-15 PROCEDURE — 80069 RENAL FUNCTION PANEL: CPT

## 2024-04-15 PROCEDURE — 93306 TTE W/DOPPLER COMPLETE: CPT | Mod: 26 | Performed by: INTERNAL MEDICINE

## 2024-04-15 PROCEDURE — 84145 PROCALCITONIN (PCT): CPT

## 2024-04-15 PROCEDURE — 85007 BL SMEAR W/DIFF WBC COUNT: CPT

## 2024-04-15 PROCEDURE — 80307 DRUG TEST PRSMV CHEM ANLYZR: CPT

## 2024-04-15 PROCEDURE — 99233 SBSQ HOSP IP/OBS HIGH 50: CPT | Performed by: INTERNAL MEDICINE

## 2024-04-15 PROCEDURE — 36415 COLL VENOUS BLD VENIPUNCTURE: CPT

## 2024-04-15 PROCEDURE — 770020 HCHG ROOM/CARE - TELE (206)

## 2024-04-15 PROCEDURE — 94760 N-INVAS EAR/PLS OXIMETRY 1: CPT

## 2024-04-15 PROCEDURE — 700102 HCHG RX REV CODE 250 W/ 637 OVERRIDE(OP): Performed by: HOSPITALIST

## 2024-04-15 PROCEDURE — 700111 HCHG RX REV CODE 636 W/ 250 OVERRIDE (IP): Performed by: INTERNAL MEDICINE

## 2024-04-15 PROCEDURE — 83605 ASSAY OF LACTIC ACID: CPT

## 2024-04-15 PROCEDURE — A9270 NON-COVERED ITEM OR SERVICE: HCPCS | Performed by: HOSPITALIST

## 2024-04-15 RX ORDER — LINEZOLID 600 MG/1
600 TABLET, FILM COATED ORAL EVERY 12 HOURS
Status: COMPLETED | OUTPATIENT
Start: 2024-04-15 | End: 2024-04-16

## 2024-04-15 RX ADMIN — SODIUM CHLORIDE, POTASSIUM CHLORIDE, SODIUM LACTATE AND CALCIUM CHLORIDE: 600; 310; 30; 20 INJECTION, SOLUTION INTRAVENOUS at 17:08

## 2024-04-15 RX ADMIN — POTASSIUM PHOSPHATE, MONOBASIC AND POTASSIUM PHOSPHATE, DIBASIC 15 MMOL: 224; 236 INJECTION, SOLUTION, CONCENTRATE INTRAVENOUS at 19:45

## 2024-04-15 RX ADMIN — HEPARIN SODIUM 5000 UNITS: 5000 INJECTION, SOLUTION INTRAVENOUS; SUBCUTANEOUS at 13:41

## 2024-04-15 RX ADMIN — NICOTINE 14 MG: 14 PATCH, EXTENDED RELEASE TRANSDERMAL at 05:23

## 2024-04-15 RX ADMIN — HEPARIN SODIUM 5000 UNITS: 5000 INJECTION, SOLUTION INTRAVENOUS; SUBCUTANEOUS at 22:00

## 2024-04-15 RX ADMIN — AMPICILLIN AND SULBACTAM 3 G: 1; 2 INJECTION, POWDER, FOR SOLUTION INTRAMUSCULAR; INTRAVENOUS at 11:34

## 2024-04-15 RX ADMIN — LINEZOLID 600 MG: 600 TABLET, FILM COATED ORAL at 17:05

## 2024-04-15 RX ADMIN — HEPARIN SODIUM 5000 UNITS: 5000 INJECTION, SOLUTION INTRAVENOUS; SUBCUTANEOUS at 05:23

## 2024-04-15 RX ADMIN — SODIUM CHLORIDE 24 MILLION UNITS: 9 INJECTION, SOLUTION INTRAVENOUS at 16:07

## 2024-04-15 RX ADMIN — SODIUM CHLORIDE, POTASSIUM CHLORIDE, SODIUM LACTATE AND CALCIUM CHLORIDE: 600; 310; 30; 20 INJECTION, SOLUTION INTRAVENOUS at 05:27

## 2024-04-15 RX ADMIN — LINEZOLID 600 MG: 600 INJECTION, SOLUTION INTRAVENOUS at 05:32

## 2024-04-15 RX ADMIN — OXYCODONE HYDROCHLORIDE 5 MG: 5 TABLET ORAL at 19:39

## 2024-04-15 RX ADMIN — AMPICILLIN AND SULBACTAM 3 G: 1; 2 INJECTION, POWDER, FOR SOLUTION INTRAMUSCULAR; INTRAVENOUS at 05:29

## 2024-04-15 ASSESSMENT — PAIN DESCRIPTION - PAIN TYPE
TYPE: ACUTE PAIN

## 2024-04-15 ASSESSMENT — ENCOUNTER SYMPTOMS
DIAPHORESIS: 1
WEAKNESS: 1

## 2024-04-15 NOTE — PROGRESS NOTES
Telemetry Shift Summary     Rhythm: SR/ST  HR:   Ectopy: r pac/pvc    Measurements: 0.16/0.08/0.36    Normal Values  Rhythm: SR  HR:   Measurements: 0.12-0.20/0.08-0.10/0.30-0.52

## 2024-04-15 NOTE — PROGRESS NOTES
"Hospital Medicine Daily Progress Note    Date of Service  4/15/2024    Chief Complaint  Oneal Tillman is a 53 y.o. male admitted 4/13/2024 with   Chief Complaint   Patient presents with    Leg Swelling     L leg x1 week. Denies CP or SOB. Denies wound or rash. Endorses nausea.    Body Aches     Reports Monday, feeling that he had h/a, body aches, nausea. Reports improvement Tuesday but states \"then I couldn't hold food down\". Reports N/V persists.          Hospital Course  No notes on file    Interval Problem Update  Patient stated he still felt very ill today.  I noted he was diaphoretic, left leg still significantly swollen, he stated he still had very high tension and pain to the left thigh but able to move his toes without any significant pain.  -Patient has positive bacteremia with strep group A  - I discussed with ID pharmacy, I recommended converting antibiotics to continue linezolid, start pen G 24,000,000 units  - I consulted ID with Dr. Kendall, pending eval    I have discussed this patient's plan of care and discharge plan at IDT rounds today with Case Management, Nursing, Nursing leadership, and other members of the IDT team.    Consultants/Specialty  critical care and infectious disease    Code Status  Full Code    Disposition  Medically Cleared  I have placed the appropriate orders for post-discharge needs.    Review of Systems  Review of Systems   Constitutional:  Positive for diaphoresis and malaise/fatigue.   Musculoskeletal:         + Left leg pain   Neurological:  Positive for weakness.        Physical Exam  Temp:  [36.5 °C (97.7 °F)-37.5 °C (99.5 °F)] 37.5 °C (99.5 °F)  Pulse:  [88-95] 89  Resp:  [16-18] 18  BP: ()/(42-61) 113/50  SpO2:  [92 %-96 %] 92 %    Physical Exam  Vitals and nursing note reviewed.   Constitutional:       General: He is not in acute distress.     Appearance: He is ill-appearing, toxic-appearing and diaphoretic.   HENT:      Mouth/Throat:      Mouth: Mucous " membranes are dry.      Pharynx: No oropharyngeal exudate.   Neck:      Comments: Right central line in place  Cardiovascular:      Rate and Rhythm: Normal rate and regular rhythm.      Pulses: Normal pulses.      Heart sounds: Normal heart sounds. No murmur heard.  Pulmonary:      Effort: Pulmonary effort is normal. No respiratory distress.      Breath sounds: Normal breath sounds. No wheezing or rales.   Abdominal:      General: Bowel sounds are normal. There is no distension.      Tenderness: There is no abdominal tenderness.   Musculoskeletal:         General: No swelling or tenderness. Normal range of motion.      Right lower leg: No edema.      Left lower leg: Edema present.   Skin:     General: Skin is warm.      Capillary Refill: Capillary refill takes less than 2 seconds.      Comments: Left leg diffuse swelling, tight on thigh, erythema on tibia, streaking redness to medial leg and thigh. Left dorsalis pedis pulse +1.   Neurological:      General: No focal deficit present.      Mental Status: He is alert and oriented to person, place, and time. Mental status is at baseline.      Motor: No weakness.   Psychiatric:         Mood and Affect: Mood normal.         Behavior: Behavior normal.         Thought Content: Thought content normal.         Judgment: Judgment normal.         Fluids    Intake/Output Summary (Last 24 hours) at 4/15/2024 1837  Last data filed at 4/15/2024 0900  Gross per 24 hour   Intake --   Output 1885 ml   Net -1885 ml       Laboratory  Recent Labs     04/13/24  1619 04/14/24  0415 04/15/24  1350   WBC 15.0* 6.8 21.8*   RBC 5.18 4.38* 4.02*   HEMOGLOBIN 15.5 13.2* 12.2*   HEMATOCRIT 46.7 39.7* 35.9*   MCV 90.2 90.6 89.3   MCH 29.9 30.1 30.3   MCHC 33.2 33.2 34.0   RDW 43.8 44.4 43.5   PLATELETCT 286 211 217   MPV 10.3 10.6 10.7     Recent Labs     04/13/24  1619 04/14/24  0415 04/15/24  1350   SODIUM 134* 135 136   POTASSIUM 4.2 3.7 3.2*   CHLORIDE 95* 103 101   CO2 20 20 23   GLUCOSE 273*  123* 111*   BUN 46* 34* 15   CREATININE 2.60* 1.28 0.77   CALCIUM 8.7 7.7* 7.9*                   Imaging  EC-ECHOCARDIOGRAM COMPLETE W/O CONT   Final Result      US-EXTREMITY VENOUS LOWER UNILAT LEFT   Final Result         No evidence of acute deep venous thrombosis in the visualized venous segments of the left lower extremity.         DX-CHEST-FOR LINE PLACEMENT Perform procedure in: Patient's Room   Final Result      1.  Interval insertion of a central venous catheter which terminates with the tip projecting over the expected region of the mid to distal superior vena cava.   2.  No acute cardiopulmonary.      CT-EXTREMITY, LOWER W/O LEFT   Final Result      1.  Left lower extremity edema versus cellulitis. No drainable fluid collections on this noncontrast study.   2.  Enlarged left external iliac and inguinal lymph nodes. They may be reactive to the left lower extremity infection, however, neoplastic nodes are difficult to rule out. Close clinical follow-up is needed.      DX-CHEST-PORTABLE (1 VIEW)   Final Result      No acute cardiopulmonary abnormality.              Assessment/Plan  * Septic shock (HCC)- (present on admission)  Assessment & Plan  This is Sepsis Present on admission  SIRS criteria identified on my evaluation include: Tachycardia, with heart rate greater than 90 BPM, Leukocytosis, with WBC greater than 12,000, and Bandemia, greater than 10% bands  Clinical indicators of end organ dysfunction include Hypotension with systolic blood pressure less than 90 or MAP less than 65, Hypotension with decrease in systolic blood pressure of more than 40mmHg, and Lactic Acid greater than 2  Source is cellulitis  Sepsis protocol initiated  Crystalloid Fluid Administration: Fluid resuscitation ordered per standard protocol - 30 mL/kg per current or ideal body weight  IV antibiotics as appropriate for source of sepsis  Reassessment: I have reassessed the patient's hemodynamic status     Patient was in septic  shock upon admission  At this time off of Levophed but having Intermittent low BP, 87/54 for overnight  WBC 21.8  - I discussed with ID pharmacy, I recommended converting antibiotics to continue linezolid, start pen G 24,000,000 units  - I consulted ID with Dr. Kendall, pending eval    Bacteremia due to Streptococcus- (present on admission)  Assessment & Plan  4/13/24 blood culture positive for strep group A  - Continue linezolid, changing to pen G  - I consulted ID  - I ordered echocardiogram -which did not show any signs of vegetations  -I ordered a repeat blood cultures    Hypokalemia- (present on admission)  Assessment & Plan  K 3.2, replacing with IV K-Phos    Hypophosphatemia- (present on admission)  Assessment & Plan  Phos 1.6 replacing with IV K-Phos    Metabolic acidosis- (present on admission)  Assessment & Plan  Likely due to reduced intravascular volume and secondary to sepsis.  Continue IV fluids  Bicarb 23    Hyponatremia- (present on admission)  Assessment & Plan  Hypovolemic hyponatremia   Continue IV fluids  Sodium 136    Dehydration- (present on admission)  Assessment & Plan  Likely secondary to reduced oral intake, and increase in insensible loss due to infection, and sepsis.  Continue IV fluids    Acute kidney injury (HCC)- (present on admission)  Assessment & Plan  Mostly due to dehydration and sepsis  Continue IV fluids  Creatinine 0.77    Tobacco dependence- (present on admission)  Assessment & Plan  Counseled on admission    Hyperglycemia- (present on admission)  Assessment & Plan  With marked hyperglycemia  Continue SSI, Accu-Cheks and hypoglycemia protocol    Cellulitis of left lower extremity- (present on admission)  Assessment & Plan  - I discussed with ID pharmacy, I recommended converting antibiotics to continue linezolid, start pen G 24,000,000 units  - I consulted ID with Dr. Kendall, pending eval         VTE prophylaxis:    heparin ppx      I have performed a physical exam and  reviewed and updated ROS and Plan today (4/15/2024). In review of yesterday's note (4/14/2024), there are no changes except as documented above.      Total time spent 51 minutes. I spent greater than 50% of the time for patient care, including unit/floor time, multiple face-to-face encounters with the patient, counseling on treatment plan and discussion with bedside RN.  Further, I spent time on my own review of patient's overnight events, RN notes, imaging and lab analysis, and developing my assessment and plan above.  In addition, working with , social workers, and Charge RN on case coordination on this date.

## 2024-04-15 NOTE — CARE PLAN
The patient is Stable - Low risk of patient condition declining or worsening    Shift Goals  Clinical Goals: Monitor blood pressure, antibx, and pain management  Patient Goals: Pain management  Family Goals: AHSAN    Progress made toward(s) clinical / shift goals:    Problem: Pain - Standard  Goal: Alleviation of pain or a reduction in pain to the patient’s comfort goal  Outcome: Progressing   Patient pain is being managed via pharmacologic and nonpharmacologic measures. See flow sheets and MAR.   Problem: Knowledge Deficit - Standard  Goal: Patient and family/care givers will demonstrate understanding of plan of care, disease process/condition, diagnostic tests and medications  Outcome: Progressing   Patient updated on POC, patient verbalized an understanding. Patient educated on medications being administered. All questions answered at this time.     Patient is not progressing towards the following goals:

## 2024-04-15 NOTE — PROGRESS NOTES
Assumed care of patient at bedside report from day shift RN. Updated on POC. Patient currently A & O x 4, on room air O2 95%, up in bed, without complaints of acute pain. Assessment completed. Call light within reach. Whiteboard updated. Fall precautions in place. Bed locked and in lowest position. All questions answered. No other needs indicated at this time.

## 2024-04-15 NOTE — CARE PLAN
The patient is Watcher - Medium risk of patient condition declining or worsening    Shift Goals  Clinical Goals: MAP>65 safety  Patient Goals: pain control/rest  Family Goals: AHSAN    Progress made toward(s) clinical / shift goals:    Problem: Pain - Standard  Goal: Alleviation of pain or a reduction in pain to the patient’s comfort goal  Outcome: Progressing     Problem: Knowledge Deficit - Standard  Goal: Patient and family/care givers will demonstrate understanding of plan of care, disease process/condition, diagnostic tests and medications  Outcome: Progressing     Problem: Hemodynamics  Goal: Patient's hemodynamics, fluid balance and neurologic status will be stable or improve  Outcome: Progressing     Problem: Fluid Volume  Goal: Fluid volume balance will be maintained  Outcome: Progressing     Problem: Urinary - Renal Perfusion  Goal: Ability to achieve and maintain adequate renal perfusion and functioning will improve  Outcome: Progressing     Problem: Respiratory  Goal: Patient will achieve/maintain optimum respiratory ventilation and gas exchange  Outcome: Progressing     Problem: Mechanical Ventilation  Goal: Safe management of artificial airway and ventilation  Outcome: Progressing  Goal: Successful weaning off mechanical ventilator, spontaneously maintains adequate gas exchange  Outcome: Progressing  Goal: Patient will be able to express needs and understand communication  Outcome: Progressing     Problem: Physical Regulation  Goal: Diagnostic test results will improve  Outcome: Progressing  Goal: Signs and symptoms of infection will decrease  Outcome: Progressing       Patient is not progressing towards the following goals:

## 2024-04-15 NOTE — PROGRESS NOTES
Telemetry Strip     Strip printed: 1222  Measurements from am strip were as follows:  Rhythm: SR  HR: 86-98  Measurements: 0.14/ 0.06/ 0.34  Ectopy: none             Normal Values  Rhythm SR  HR Range    Measurements 0.12-0.20 / 0.06-0.10  / 0.30-0.52

## 2024-04-16 ENCOUNTER — APPOINTMENT (OUTPATIENT)
Dept: RADIOLOGY | Facility: MEDICAL CENTER | Age: 54
DRG: 871 | End: 2024-04-16
Attending: INTERNAL MEDICINE
Payer: COMMERCIAL

## 2024-04-16 LAB
ALBUMIN SERPL BCP-MCNC: 2.1 G/DL (ref 3.2–4.9)
BACTERIA BLD CULT: ABNORMAL
BACTERIA BLD CULT: ABNORMAL
BACTERIA UR CULT: NORMAL
BUN SERPL-MCNC: 16 MG/DL (ref 8–22)
CALCIUM ALBUM COR SERPL-MCNC: 9.3 MG/DL (ref 8.5–10.5)
CALCIUM SERPL-MCNC: 7.8 MG/DL (ref 8.4–10.2)
CHLORIDE SERPL-SCNC: 100 MMOL/L (ref 96–112)
CO2 SERPL-SCNC: 24 MMOL/L (ref 20–33)
CREAT SERPL-MCNC: 0.76 MG/DL (ref 0.5–1.4)
ERYTHROCYTE [DISTWIDTH] IN BLOOD BY AUTOMATED COUNT: 44.3 FL (ref 35.9–50)
GFR SERPLBLD CREATININE-BSD FMLA CKD-EPI: 106 ML/MIN/1.73 M 2
GLUCOSE BLD STRIP.AUTO-MCNC: 107 MG/DL (ref 65–99)
GLUCOSE BLD STRIP.AUTO-MCNC: 99 MG/DL (ref 65–99)
GLUCOSE SERPL-MCNC: 114 MG/DL (ref 65–99)
HCT VFR BLD AUTO: 33.8 % (ref 42–52)
HGB BLD-MCNC: 11.5 G/DL (ref 14–18)
MAGNESIUM SERPL-MCNC: 1.8 MG/DL (ref 1.5–2.5)
MCH RBC QN AUTO: 30.6 PG (ref 27–33)
MCHC RBC AUTO-ENTMCNC: 34 G/DL (ref 32.3–36.5)
MCV RBC AUTO: 89.9 FL (ref 81.4–97.8)
PHOSPHATE SERPL-MCNC: 1.9 MG/DL (ref 2.5–4.5)
PLATELET # BLD AUTO: 219 K/UL (ref 164–446)
PMV BLD AUTO: 10.8 FL (ref 9–12.9)
POTASSIUM SERPL-SCNC: 3.4 MMOL/L (ref 3.6–5.5)
PROCALCITONIN SERPL-MCNC: 3.3 NG/ML
RBC # BLD AUTO: 3.76 M/UL (ref 4.7–6.1)
SIGNIFICANT IND 70042: ABNORMAL
SIGNIFICANT IND 70042: NORMAL
SITE SITE: ABNORMAL
SITE SITE: NORMAL
SODIUM SERPL-SCNC: 134 MMOL/L (ref 135–145)
SOURCE SOURCE: ABNORMAL
SOURCE SOURCE: NORMAL
WBC # BLD AUTO: 27.3 K/UL (ref 4.8–10.8)

## 2024-04-16 PROCEDURE — 99255 IP/OBS CONSLTJ NEW/EST HI 80: CPT | Performed by: INTERNAL MEDICINE

## 2024-04-16 PROCEDURE — 700102 HCHG RX REV CODE 250 W/ 637 OVERRIDE(OP): Performed by: INTERNAL MEDICINE

## 2024-04-16 PROCEDURE — 83735 ASSAY OF MAGNESIUM: CPT

## 2024-04-16 PROCEDURE — A9270 NON-COVERED ITEM OR SERVICE: HCPCS | Performed by: HOSPITALIST

## 2024-04-16 PROCEDURE — 73701 CT LOWER EXTREMITY W/DYE: CPT | Mod: LT

## 2024-04-16 PROCEDURE — 700105 HCHG RX REV CODE 258: Performed by: INTERNAL MEDICINE

## 2024-04-16 PROCEDURE — 700102 HCHG RX REV CODE 250 W/ 637 OVERRIDE(OP): Performed by: HOSPITALIST

## 2024-04-16 PROCEDURE — 82962 GLUCOSE BLOOD TEST: CPT | Mod: 91

## 2024-04-16 PROCEDURE — 770020 HCHG ROOM/CARE - TELE (206)

## 2024-04-16 PROCEDURE — 94760 N-INVAS EAR/PLS OXIMETRY 1: CPT

## 2024-04-16 PROCEDURE — 700111 HCHG RX REV CODE 636 W/ 250 OVERRIDE (IP): Performed by: HOSPITALIST

## 2024-04-16 PROCEDURE — 700111 HCHG RX REV CODE 636 W/ 250 OVERRIDE (IP): Performed by: INTERNAL MEDICINE

## 2024-04-16 PROCEDURE — A9270 NON-COVERED ITEM OR SERVICE: HCPCS | Performed by: INTERNAL MEDICINE

## 2024-04-16 PROCEDURE — 700111 HCHG RX REV CODE 636 W/ 250 OVERRIDE (IP): Mod: JZ | Performed by: INTERNAL MEDICINE

## 2024-04-16 PROCEDURE — 700117 HCHG RX CONTRAST REV CODE 255: Performed by: INTERNAL MEDICINE

## 2024-04-16 PROCEDURE — 99232 SBSQ HOSP IP/OBS MODERATE 35: CPT | Performed by: INTERNAL MEDICINE

## 2024-04-16 PROCEDURE — 700105 HCHG RX REV CODE 258: Performed by: STUDENT IN AN ORGANIZED HEALTH CARE EDUCATION/TRAINING PROGRAM

## 2024-04-16 PROCEDURE — 85027 COMPLETE CBC AUTOMATED: CPT

## 2024-04-16 PROCEDURE — 84145 PROCALCITONIN (PCT): CPT

## 2024-04-16 PROCEDURE — 80069 RENAL FUNCTION PANEL: CPT

## 2024-04-16 RX ORDER — LINEZOLID 600 MG/1
600 TABLET, FILM COATED ORAL EVERY 12 HOURS
Status: DISCONTINUED | OUTPATIENT
Start: 2024-04-16 | End: 2024-04-17

## 2024-04-16 RX ORDER — FUROSEMIDE 10 MG/ML
40 INJECTION INTRAMUSCULAR; INTRAVENOUS ONCE
Status: COMPLETED | OUTPATIENT
Start: 2024-04-16 | End: 2024-04-16

## 2024-04-16 RX ORDER — POTASSIUM CHLORIDE 20 MEQ/1
40 TABLET, EXTENDED RELEASE ORAL ONCE
Status: COMPLETED | OUTPATIENT
Start: 2024-04-16 | End: 2024-04-16

## 2024-04-16 RX ADMIN — NICOTINE 14 MG: 14 PATCH, EXTENDED RELEASE TRANSDERMAL at 04:34

## 2024-04-16 RX ADMIN — OXYCODONE HYDROCHLORIDE 5 MG: 5 TABLET ORAL at 01:11

## 2024-04-16 RX ADMIN — IOHEXOL 75 ML: 350 INJECTION, SOLUTION INTRAVENOUS at 15:50

## 2024-04-16 RX ADMIN — HYDROMORPHONE HYDROCHLORIDE 0.5 MG: 1 INJECTION, SOLUTION INTRAMUSCULAR; INTRAVENOUS; SUBCUTANEOUS at 12:17

## 2024-04-16 RX ADMIN — HEPARIN SODIUM 5000 UNITS: 5000 INJECTION, SOLUTION INTRAVENOUS; SUBCUTANEOUS at 04:34

## 2024-04-16 RX ADMIN — POTASSIUM CHLORIDE 40 MEQ: 1500 TABLET, EXTENDED RELEASE ORAL at 15:55

## 2024-04-16 RX ADMIN — HEPARIN SODIUM 5000 UNITS: 5000 INJECTION, SOLUTION INTRAVENOUS; SUBCUTANEOUS at 21:30

## 2024-04-16 RX ADMIN — LINEZOLID 600 MG: 600 TABLET, FILM COATED ORAL at 17:24

## 2024-04-16 RX ADMIN — LINEZOLID 600 MG: 600 TABLET, FILM COATED ORAL at 04:34

## 2024-04-16 RX ADMIN — HEPARIN SODIUM 5000 UNITS: 5000 INJECTION, SOLUTION INTRAVENOUS; SUBCUTANEOUS at 14:16

## 2024-04-16 RX ADMIN — FUROSEMIDE 40 MG: 10 INJECTION INTRAMUSCULAR; INTRAVENOUS at 15:55

## 2024-04-16 RX ADMIN — SODIUM CHLORIDE 24 MILLION UNITS: 9 INJECTION, SOLUTION INTRAVENOUS at 16:54

## 2024-04-16 RX ADMIN — OXYCODONE HYDROCHLORIDE 5 MG: 5 TABLET ORAL at 05:53

## 2024-04-16 RX ADMIN — OXYCODONE HYDROCHLORIDE 10 MG: 10 TABLET ORAL at 19:54

## 2024-04-16 RX ADMIN — SODIUM CHLORIDE, POTASSIUM CHLORIDE, SODIUM LACTATE AND CALCIUM CHLORIDE: 600; 310; 30; 20 INJECTION, SOLUTION INTRAVENOUS at 05:24

## 2024-04-16 RX ADMIN — OXYCODONE HYDROCHLORIDE 10 MG: 10 TABLET ORAL at 12:12

## 2024-04-16 ASSESSMENT — PAIN DESCRIPTION - PAIN TYPE
TYPE: ACUTE PAIN

## 2024-04-16 ASSESSMENT — ENCOUNTER SYMPTOMS
BLURRED VISION: 0
PHOTOPHOBIA: 0
DEPRESSION: 0
MYALGIAS: 0
SPEECH CHANGE: 0
DIARRHEA: 0
COUGH: 0
FEVER: 0
SHORTNESS OF BREATH: 0
WEAKNESS: 0
INSOMNIA: 0
SENSORY CHANGE: 0
NERVOUS/ANXIOUS: 0
ABDOMINAL PAIN: 0
HEARTBURN: 0
CHILLS: 0
CONSTIPATION: 0
CLAUDICATION: 0
HEADACHES: 0
VOMITING: 0
DIZZINESS: 0

## 2024-04-16 ASSESSMENT — FIBROSIS 4 INDEX: FIB4 SCORE: 1.04

## 2024-04-16 NOTE — CONSULTS
[facility] INFECTIOUS DISEASES INPATIENT CONSULT NOTE     Date of Service: 4/16/2024    Consult Requested By: Tammie Zendejas D.O.    Reason for Consultation: Cellulitis, bacteremia    Chief Complaint: Infection    History of Present Illness:     Oneal Tillman is a 53 y.o. male admitted 4/13/2024.  Extensive review of documentation from multiple specialties performed in generating this HPI.  Patient with prior tobacco dependence, at risk for diabetes, presented with left lower extremity pain, redness and swelling with generalized weakness that has been worsening, notes that it may have happened following a spider bite.  In the ER, he was noted to be tachycardic and hypotensive with a lactate of 4.3 and white count of 15.  CT scan with edema without gas or abscess noted.  Blood culture from admission positive for penicillin susceptible group A Strep.  Patient is now on linezolid and penicillin but white count continues to worsen.  General surgery evaluated earlier on in admission and recommended conservative management.    Review of Systems:  All other systems reviewed and are negative expect as noted in HPI    Past Medical History:   Diagnosis Date    Patient denies medical problems        No past surgical history on file.    No family history on file.    Social History     Socioeconomic History    Marital status: Single     Spouse name: Not on file    Number of children: Not on file    Years of education: Not on file    Highest education level: Not on file   Occupational History    Not on file   Tobacco Use    Smoking status: Every Day     Current packs/day: 1.00     Types: Cigarettes    Smokeless tobacco: Never   Vaping Use    Vaping Use: Never used   Substance and Sexual Activity    Alcohol use: Yes     Comment: rarely    Drug use: No    Sexual activity: Not on file   Other Topics Concern    Not on file   Social History Narrative    Not on file     Social Determinants of Health     Financial Resource Strain: Not on  file   Food Insecurity: Not on file   Transportation Needs: Not on file   Physical Activity: Not on file   Stress: Not on file   Social Connections: Not on file   Intimate Partner Violence: Not on file   Housing Stability: Not on file       No Known Allergies    Medications:    Current Facility-Administered Medications:     linezolid (Zyvox) tablet 600 mg, 600 mg, Oral, Q12HRS, Tammie Zendejas D.O.    penicillin G potassium 24 Million Units in  mL infusion, 24 Million Units, Intravenous, Continuous Abx, Carrillo Traylor M.D., Last Rate: 21 mL/hr at 04/15/24 1607, 24 Million Units at 04/15/24 1607    acetaminophen (Tylenol) tablet 650 mg, 650 mg, Oral, Q6HRS PRN, Darrell Hayes M.D.    senna-docusate (Pericolace Or Senokot S) 8.6-50 MG per tablet 2 Tablet, 2 Tablet, Oral, BID, 2 Tablet at 04/14/24 0540 **AND** polyethylene glycol/lytes (Miralax) Packet 1 Packet, 1 Packet, Oral, QDAY PRN, Darrell Hayes M.D.    lactated ringers infusion, , Intravenous, Continuous, Nargis Hanley M.D., Last Rate: 83 mL/hr at 04/16/24 0524, New Bag at 04/16/24 0524    Notify provider if pain remains uncontrolled, , , CONTINUOUS **AND** Use the Numeric Rating Scale (NRS), Green-Baker Faces (WBF), or FLACC on regular floors and Critical-Care Pain Observation Tool (CPOT) on ICUs/Trauma to assess pain, , , CONTINUOUS **AND** Pulse Ox, , , CONTINUOUS **AND** Pharmacy Consult Request ...Pain Management Review 1 Each, 1 Each, Other, PHARMACY TO DOSE **AND** If patient difficult to arouse and/or has respiratory depression (respiratory rate of 10 or less), stop any opiates that are currently infusing and call a Rapid Response., , , CONTINUOUS, Darrell Hayes M.D.    oxyCODONE immediate-release (Roxicodone) tablet 5 mg, 5 mg, Oral, Q3HRS PRN, 5 mg at 04/16/24 0553 **OR** oxyCODONE immediate release (Roxicodone) tablet 10 mg, 10 mg, Oral, Q3HRS PRN, 10 mg at 04/16/24 1212 **OR** HYDROmorphone (Dilaudid) injection 0.5 mg, 0.5 mg,  "Intravenous, Q3HRS PRN, Darrell Hayes M.D., 0.5 mg at 24 1217    ondansetron (Zofran) syringe/vial injection 4 mg, 4 mg, Intravenous, Q4HRS PRN, Darrell Hayes M.D.    ondansetron (Zofran ODT) dispertab 4 mg, 4 mg, Oral, Q4HRS PRN, Darrell Hayes M.D.    promethazine (Phenergan) tablet 12.5-25 mg, 12.5-25 mg, Oral, Q4HRS PRN, Darrell Hayes M.D.    promethazine (Phenergan) suppository 12.5-25 mg, 12.5-25 mg, Rectal, Q4HRS PRN, Darrell Hayes M.D.    prochlorperazine (Compazine) injection 5-10 mg, 5-10 mg, Intravenous, Q4HRS PRN, Darrell Hayes M.D.    insulin regular (HumuLIN R,NovoLIN R) injection, 2-9 Units, Subcutaneous, 4X/DAY ACHS, 2 Units at 24 1134 **AND** POC blood glucose manual result, , , Q AC AND BEDTIME(S) **AND** NOTIFY MD and PharmD, , , Once **AND** Administer 20 grams of glucose (approximately 8 ounces of fruit juice) every 15 minutes PRN FSBG less than 70 mg/dL, , , PRN **AND** dextrose 10 % BOLUS 25 g, 25 g, Intravenous, Q15 MIN PRN, Darrell Hayes M.D.    heparin injection 5,000 Units, 5,000 Units, Subcutaneous, Q8HRS, Darrell Hayes M.D., 5,000 Units at 24 0434    nicotine (Nicoderm) 14 MG/24HR 14 mg, 14 mg, Transdermal, Daily-0600, 14 mg at 24 0434 **AND** Nicotine Replacement Patient Education Materials, , , Once **AND** nicotine polacrilex (Nicorette) 2 MG piece 2 mg, 2 mg, Oral, Q HOUR PRN, Darrell Hayes M.D.    Physical Exam:   Vital Signs: /67   Pulse 89   Temp 36.6 °C (97.9 °F) (Oral)   Resp 16   Ht 1.803 m (5' 10.98\")   Wt 80.2 kg (176 lb 12.9 oz)   SpO2 94%   BMI 24.67 kg/m²   Temp  Av.6 °C (97.9 °F)  Min: 36.1 °C (96.9 °F)  Max: 37.5 °C (99.5 °F)  Vital signs reviewed  Constitutional: Patient is oriented to person, place, and time. Appears well-developed and well-nourished. No distress  Head: Atraumatic, normocephalic  Eyes: Conjunctivae normal, EOM intact   Mouth/Throat: Lips without lesions, good dentition  Neck: Neck " "supple. No masses/lymphadenopathy  Cardiovascular: Normal rate, regular rhythm, no pedal edema.  Pulmonary/Chest: No respiratory distress. Unlabored respiratory effort  Abdominal: Non tender  Musculoskeletal: Left lower extremity overall worsened compared to pictures from day of admission, erythema over anterior proximal leg more dusky and without significant swelling or pain or induration, erythema over posterior thigh however has  progressed approximately, is indurated up to the groin, tenderness is mild however and there is some firmness to palpation.  Small blister formation in the popliteal region and proximal medial thigh  Neurological: Alert and oriented to person, place, and time. No gross cranial nerve deficit. No focal neural deficit noted  Skin: As above  Psychiatric: Normal mood and affect. Behavior is normal.     LABS:  Recent Labs     04/14/24  0415 04/15/24  1350 04/16/24  0235   WBC 6.8 21.8* 27.3*      Recent Labs     04/14/24  0415 04/15/24  1350 04/16/24  0235   HEMOGLOBIN 13.2* 12.2* 11.5*   HEMATOCRIT 39.7* 35.9* 33.8*   MCV 90.6 89.3 89.9   MCH 30.1 30.3 30.6   MACROCYTOSIS  --  1+  --    ANISOCYTOSIS  --  1+  --    PLATELETCT 211 217 219       Recent Labs     04/14/24  0415 04/15/24  1350 04/16/24  0235   SODIUM 135 136 134*   POTASSIUM 3.7 3.2* 3.4*   CHLORIDE 103 101 100   CO2 20 23 24   CREATININE 1.28 0.77 0.76        Recent Labs     04/14/24  0415 04/15/24  1350 04/16/24  0235   ALBUMIN 2.2* 1.8* 2.1*        MICRO:  No results found for: \"BLOODCULTU\", \"BLDCULT\", \"BCHOLD\"     Latest pertinent labs were reviewed    IMAGING STUDIES:  As above    Hospital Course/Assessment:   Oneal Tillman is a 53 y.o. male with with prior tobacco dependence, at risk for diabetes, admitted with left lower extremity cellulitis and group A strep bacteremia, penicillin susceptible.    Patient has been on appropriate antibiotics but the  extent of erythema has worsened and traveled approximately with induration " and blistering, white count also continued to trend up, 21,000 today.  The involved areas are firm to palpation but not  significantly tender.    Pertinent Diagnoses:  Group A strep bacteremia, penicillin susceptible  Left lower extremity cellulitis, worse   Leukocytosis, worse    Plan:   -Recommend surgery reevaluation given worsening despite appropriate antimicrobial therapy, concern for possible early developing necrotizing fasciitis   -Repeat CT scan with contrast  -Okay to continue IV penicillin and linezolid for now  -Follow repeat blood cultures x 2  -HIV and hepatitis C antibodies in a.m.    Disposition: Unable to determine at this time  Need for PICC line: Unable to determine at this time    Plan was discussed with the primary team, Dr Zendejas. ID will follow.  This illness poses a threat to life.     Christopher Kendall M.D.    Please note that this dictation was created using voice recognition software. I have worked with technical experts from [facility] to optimize the interface.  I have made every reasonable attempt to correct obvious errors, but there may be errors of grammar and possibly content that I did not discover before finalizing the note.

## 2024-04-16 NOTE — CARE PLAN
The patient is Watcher - Medium risk of patient condition declining or worsening    Shift Goals  Clinical Goals: Monitor blood pressures, antibx, and pain management  Patient Goals: Pain management  Family Goals: AHSAN    Progress made toward(s) clinical / shift goals:    Problem: Pain - Standard  Goal: Alleviation of pain or a reduction in pain to the patient’s comfort goal  Outcome: Progressing   Patient pain is being managed via pharmacologic and nonpharmacologic interventions. See flow sheets and MAR.   Problem: Knowledge Deficit - Standard  Goal: Patient and family/care givers will demonstrate understanding of plan of care, disease process/condition, diagnostic tests and medications  Outcome: Progressing   Patient updated on POC, patient verbalized an understanding. Patient educated on medications being administered. All questions answered at this time.     Patient is not progressing towards the following goals:

## 2024-04-16 NOTE — PROGRESS NOTES
Assumed care of patient at bedside report from day shift RN. Updated on POC. Patient currently A & O x 4, on room air O2 94%, up in bed, with complaints of acute pain. Patient medicated per MAR. Assessment completed. Call light within reach. Whiteboard updated. Fall precautions in place. Bed locked and in lowest position. All questions answered. No other needs indicated at this time.

## 2024-04-16 NOTE — PROGRESS NOTES
Hospital Medicine Daily Progress Note    Date of Service  4/16/2024    Chief Complaint  Oneal Tillman is a 53 y.o. male admitted 4/13/2024 with left leg pain and erythema    Hospital Course  Patient is a 53-year-old male with diabetes and tobacco dependence who presented with leg swelling of the left leg for 1 week and body aches.  Questionable spider bite on the left leg after he changed his pants and then 2 days later started having worsening pain redness and swelling.  Also noticed chills palpitations weakness.  Tachycardic with a heart rate of 104 and low blood pressure.  His initial lactic acid was 4.3 and white count was 15.  Patient was initially admitted and given fluid resuscitation and started on IV antibiotics.  Blood cultures came back positive for group B strep and infectious disease recommended in addition to continuation of Zyvox to add pen G.  He is having worsening left leg erythema and ascending rash with blistering lesions.  Repeat CT left leg pending.    Interval Problem Update  4/16 patient states he is having significant pain in the left leg, thinks he feels his thigh is softer but he still having excruciating pain.  Denies any fevers overnight but is diaphoretic at times.  Patient now with bulla in the left groin and significant scrotal edema.  IV fluids discontinued as his acute kidney injury has resolved, single dose IV Lasix ordered, repeat CT left lower extremity per infectious disease recommendations to make sure there is not significant progression or gas producing lesion.    I have discussed this patient's plan of care and discharge plan at IDT rounds today with Case Management, Nursing, Nursing leadership, and other members of the IDT team.    Consultants/Specialty  infectious disease    Code Status  Full Code    Disposition  The patient is not medically cleared for discharge to home or a post-acute facility.      I have placed the appropriate orders for post-discharge  needs.    Review of Systems  Review of Systems   Constitutional:  Negative for chills and fever.   HENT:  Negative for congestion.    Eyes:  Negative for blurred vision and photophobia.   Respiratory:  Negative for cough and shortness of breath.    Cardiovascular:  Positive for leg swelling (Left leg and scrotal swelling). Negative for chest pain and claudication.   Gastrointestinal:  Negative for abdominal pain, constipation, diarrhea, heartburn and vomiting.   Genitourinary:  Negative for dysuria and hematuria.   Musculoskeletal:  Positive for joint pain. Negative for myalgias.   Skin:  Positive for rash. Negative for itching.   Neurological:  Negative for dizziness, sensory change, speech change, weakness and headaches.   Psychiatric/Behavioral:  Negative for depression. The patient is not nervous/anxious and does not have insomnia.         Physical Exam  Temp:  [36.4 °C (97.6 °F)-37.5 °C (99.5 °F)] 36.6 °C (97.9 °F)  Pulse:  [89-96] 89  Resp:  [16-18] 16  BP: ()/(50-67) 107/67  SpO2:  [92 %-94 %] 94 %    Physical Exam  Vitals and nursing note reviewed.   Constitutional:       General: He is not in acute distress.     Appearance: Normal appearance.   HENT:      Head: Normocephalic and atraumatic.   Eyes:      General: No scleral icterus.     Extraocular Movements: Extraocular movements intact.   Cardiovascular:      Rate and Rhythm: Normal rate and regular rhythm.      Pulses: Normal pulses.      Heart sounds: Normal heart sounds. No murmur heard.  Pulmonary:      Effort: Pulmonary effort is normal. No respiratory distress.      Breath sounds: Normal breath sounds. No wheezing, rhonchi or rales.   Abdominal:      General: Abdomen is flat. Bowel sounds are normal. There is no distension.      Palpations: Abdomen is soft.      Tenderness: There is no rebound.   Musculoskeletal:         General: No swelling or tenderness.      Cervical back: Normal range of motion and neck supple.   Lymphadenopathy:       Cervical: No cervical adenopathy.   Skin:     General: Skin is warm.      Coloration: Skin is not jaundiced.      Findings: Bruising (Left popliteal bruising, ruptured bulla dressing removed, new bulla in the left groin with erythema extending from the left popliteal up to the left brain.  Leg is tender over the rash, no evidence of compartment syndrome at this time as the thigh is soft) present. No erythema.   Neurological:      General: No focal deficit present.      Mental Status: He is alert and oriented to person, place, and time. Mental status is at baseline.      Cranial Nerves: No cranial nerve deficit.   Psychiatric:         Mood and Affect: Mood normal.         Behavior: Behavior normal.         Fluids    Intake/Output Summary (Last 24 hours) at 4/16/2024 1536  Last data filed at 4/16/2024 0437  Gross per 24 hour   Intake --   Output 1000 ml   Net -1000 ml       Laboratory  Recent Labs     04/14/24  0415 04/15/24  1350 04/16/24  0235   WBC 6.8 21.8* 27.3*   RBC 4.38* 4.02* 3.76*   HEMOGLOBIN 13.2* 12.2* 11.5*   HEMATOCRIT 39.7* 35.9* 33.8*   MCV 90.6 89.3 89.9   MCH 30.1 30.3 30.6   MCHC 33.2 34.0 34.0   RDW 44.4 43.5 44.3   PLATELETCT 211 217 219   MPV 10.6 10.7 10.8     Recent Labs     04/14/24  0415 04/15/24  1350 04/16/24  0235   SODIUM 135 136 134*   POTASSIUM 3.7 3.2* 3.4*   CHLORIDE 103 101 100   CO2 20 23 24   GLUCOSE 123* 111* 114*   BUN 34* 15 16   CREATININE 1.28 0.77 0.76   CALCIUM 7.7* 7.9* 7.8*                   Imaging  EC-ECHOCARDIOGRAM COMPLETE W/O CONT   Final Result      US-EXTREMITY VENOUS LOWER UNILAT LEFT   Final Result         No evidence of acute deep venous thrombosis in the visualized venous segments of the left lower extremity.         DX-CHEST-FOR LINE PLACEMENT Perform procedure in: Patient's Room   Final Result      1.  Interval insertion of a central venous catheter which terminates with the tip projecting over the expected region of the mid to distal superior vena cava.   2.   No acute cardiopulmonary.      CT-EXTREMITY, LOWER W/O LEFT   Final Result      1.  Left lower extremity edema versus cellulitis. No drainable fluid collections on this noncontrast study.   2.  Enlarged left external iliac and inguinal lymph nodes. They may be reactive to the left lower extremity infection, however, neoplastic nodes are difficult to rule out. Close clinical follow-up is needed.      DX-CHEST-PORTABLE (1 VIEW)   Final Result      No acute cardiopulmonary abnormality.         CT-EXTREMITY, LOWER WITH LEFT    (Results Pending)        Assessment/Plan  * Septic shock (HCC)- (present on admission)  Assessment & Plan  This is Sepsis Present on admission  SIRS criteria identified on my evaluation include: Tachycardia, with heart rate greater than 90 BPM, Leukocytosis, with WBC greater than 12,000, and Bandemia, greater than 10% bands  Clinical indicators of end organ dysfunction include Hypotension with systolic blood pressure less than 90 or MAP less than 65, Hypotension with decrease in systolic blood pressure of more than 40mmHg, and Lactic Acid greater than 2  Source is cellulitis  Sepsis protocol initiated  Crystalloid Fluid Administration: Fluid resuscitation ordered per standard protocol - 30 mL/kg per current or ideal body weight  IV antibiotics as appropriate for source of sepsis  Reassessment: I have reassessed the patient's hemodynamic status     Patient was in septic shock upon admission  At this time off of Levophed but having Intermittent low BP, 87/54 for overnight  WBC 27.3  - I discussed with ID pharmacy, I recommended converting antibiotics to continue linezolid, start pen G 24,000,000 units  - I consulted ID with Dr. Kendall, pending eval    Hypokalemia- (present on admission)  Assessment & Plan  K 3.4, replacing  PO    Hypophosphatemia- (present on admission)  Assessment & Plan  Phos 1.6 replacing with IV K-Phos    Bacteremia due to Streptococcus- (present on admission)  Assessment &  Plan  4/13/24 blood culture positive for strep group A  - Continue linezolid, changing to pen G  - I consulted ID  - I ordered echocardiogram -which did not show any signs of vegetations  -Repeat CT left lower extremity given worsening cellulitis and increasing WBC count  Low threshold to reconsult surgery    Metabolic acidosis- (present on admission)  Assessment & Plan  Likely due to reduced intravascular volume and secondary to sepsis.  Continue IV fluids  Bicarb 23    Hyponatremia- (present on admission)  Assessment & Plan  Hypovolemic hyponatremia   Continue IV fluids  Sodium 136    Dehydration- (present on admission)  Assessment & Plan  Likely secondary to reduced oral intake, and increase in insensible loss due to infection, and sepsis.  Continue IV fluids    Acute kidney injury (HCC)- (present on admission)  Assessment & Plan  Resolved, now with volume overload, single dose Lasix    Tobacco dependence- (present on admission)  Assessment & Plan  Counseled on admission    Hyperglycemia- (present on admission)  Assessment & Plan  With marked hyperglycemia  Continue SSI, Accu-Cheks and hypoglycemia protocol    Cellulitis of left lower extremity- (present on admission)  Assessment & Plan  - I discussed with ID pharmacy, I recommended converting antibiotics to continue linezolid, start pen G 24,000,000 units  Discussed with Dr. Kendall, pending eval         VTE prophylaxis:   SCDs/TEDs      I have performed a physical exam and reviewed and updated ROS and Plan today (4/16/2024). In review of yesterday's note (4/15/2024), there are no changes except as documented above.

## 2024-04-16 NOTE — HOSPITAL COURSE
Patient is a 53-year-old male with diabetes and tobacco dependence who presented with leg swelling of the left leg for 1 week and body aches.  Questionable spider bite on the left leg after he changed his pants and then 2 days later started having worsening pain redness and swelling.  Also noticed chills palpitations weakness.  Tachycardic with a heart rate of 104 and low blood pressure.  His initial lactic acid was 4.3 and white count was 15.  Patient was initially admitted and given fluid resuscitation and started on IV antibiotics.  Blood cultures came back positive for group B strep and infectious disease recommended in addition to continuation of Zyvox to add pen G.  He is having worsening left leg erythema and ascending rash with blistering lesions.  Repeat CT left leg pending.

## 2024-04-16 NOTE — CARE PLAN
The patient is Stable - Low risk of patient condition declining or worsening    Shift Goals  Clinical Goals: MAP>65/ABX  Patient Goals: pain control  Family Goals: isidoro    Progress made toward(s) clinical / shift goals:    Problem: Pain - Standard  Goal: Alleviation of pain or a reduction in pain to the patient’s comfort goal  Outcome: Progressing     Problem: Knowledge Deficit - Standard  Goal: Patient and family/care givers will demonstrate understanding of plan of care, disease process/condition, diagnostic tests and medications  Outcome: Progressing     Problem: Hemodynamics  Goal: Patient's hemodynamics, fluid balance and neurologic status will be stable or improve  Outcome: Progressing     Problem: Fluid Volume  Goal: Fluid volume balance will be maintained  Outcome: Progressing     Problem: Urinary - Renal Perfusion  Goal: Ability to achieve and maintain adequate renal perfusion and functioning will improve  Outcome: Progressing     Problem: Respiratory  Goal: Patient will achieve/maintain optimum respiratory ventilation and gas exchange  Outcome: Progressing     Problem: Mechanical Ventilation  Goal: Safe management of artificial airway and ventilation  Outcome: Progressing  Goal: Successful weaning off mechanical ventilator, spontaneously maintains adequate gas exchange  Outcome: Progressing  Goal: Patient will be able to express needs and understand communication  Outcome: Progressing     Problem: Physical Regulation  Goal: Diagnostic test results will improve  Outcome: Progressing  Goal: Signs and symptoms of infection will decrease  Outcome: Progressing     Problem: Fall Risk  Goal: Patient will remain free from falls  Outcome: Progressing       Patient is not progressing towards the following goals:

## 2024-04-16 NOTE — ASSESSMENT & PLAN NOTE
4/13/24 blood culture positive for strep group A  4/15  -cultures negative x 2  Echo did not show vegetations    Continue IV Unasyn as per ID recommendations

## 2024-04-16 NOTE — PROGRESS NOTES
Telemetry Shift Summary     Rhythm: SR  HR: 91-98  Ectopy: r pvc    Measurements: 0.18/0.08/0.32    Normal Values  Rhythm: SR  HR:   Measurements: 0.12-0.20/0.08-0.10/0.30-0.52

## 2024-04-17 LAB
ANION GAP SERPL CALC-SCNC: 13 MMOL/L (ref 7–16)
BUN SERPL-MCNC: 15 MG/DL (ref 8–22)
CALCIUM SERPL-MCNC: 7.4 MG/DL (ref 8.4–10.2)
CHLORIDE SERPL-SCNC: 96 MMOL/L (ref 96–112)
CO2 SERPL-SCNC: 21 MMOL/L (ref 20–33)
CREAT SERPL-MCNC: 0.81 MG/DL (ref 0.5–1.4)
ERYTHROCYTE [DISTWIDTH] IN BLOOD BY AUTOMATED COUNT: 46.4 FL (ref 35.9–50)
GFR SERPLBLD CREATININE-BSD FMLA CKD-EPI: 104 ML/MIN/1.73 M 2
GLUCOSE SERPL-MCNC: 118 MG/DL (ref 65–99)
HCT VFR BLD AUTO: 35.8 % (ref 42–52)
HCV AB SER QL: NORMAL
HGB BLD-MCNC: 11.9 G/DL (ref 14–18)
HIV 1+2 AB+HIV1 P24 AG SERPL QL IA: NORMAL
MCH RBC QN AUTO: 30.3 PG (ref 27–33)
MCHC RBC AUTO-ENTMCNC: 33.2 G/DL (ref 32.3–36.5)
MCV RBC AUTO: 91.1 FL (ref 81.4–97.8)
PLATELET # BLD AUTO: 184 K/UL (ref 164–446)
PMV BLD AUTO: 11.6 FL (ref 9–12.9)
POTASSIUM SERPL-SCNC: 3.4 MMOL/L (ref 3.6–5.5)
RBC # BLD AUTO: 3.93 M/UL (ref 4.7–6.1)
SCCMEC + MECA PNL NOSE NAA+PROBE: NEGATIVE
SODIUM SERPL-SCNC: 130 MMOL/L (ref 135–145)
WBC # BLD AUTO: 22.3 K/UL (ref 4.8–10.8)

## 2024-04-17 PROCEDURE — 99233 SBSQ HOSP IP/OBS HIGH 50: CPT | Performed by: INTERNAL MEDICINE

## 2024-04-17 PROCEDURE — 94760 N-INVAS EAR/PLS OXIMETRY 1: CPT

## 2024-04-17 PROCEDURE — 700111 HCHG RX REV CODE 636 W/ 250 OVERRIDE (IP): Performed by: HOSPITALIST

## 2024-04-17 PROCEDURE — 87389 HIV-1 AG W/HIV-1&-2 AB AG IA: CPT

## 2024-04-17 PROCEDURE — 80048 BASIC METABOLIC PNL TOTAL CA: CPT

## 2024-04-17 PROCEDURE — A9270 NON-COVERED ITEM OR SERVICE: HCPCS | Performed by: INTERNAL MEDICINE

## 2024-04-17 PROCEDURE — A9270 NON-COVERED ITEM OR SERVICE: HCPCS | Performed by: HOSPITALIST

## 2024-04-17 PROCEDURE — 87641 MR-STAPH DNA AMP PROBE: CPT

## 2024-04-17 PROCEDURE — 700111 HCHG RX REV CODE 636 W/ 250 OVERRIDE (IP): Mod: JZ | Performed by: INTERNAL MEDICINE

## 2024-04-17 PROCEDURE — 700111 HCHG RX REV CODE 636 W/ 250 OVERRIDE (IP): Performed by: INTERNAL MEDICINE

## 2024-04-17 PROCEDURE — 86803 HEPATITIS C AB TEST: CPT

## 2024-04-17 PROCEDURE — 85027 COMPLETE CBC AUTOMATED: CPT

## 2024-04-17 PROCEDURE — 700102 HCHG RX REV CODE 250 W/ 637 OVERRIDE(OP): Performed by: INTERNAL MEDICINE

## 2024-04-17 PROCEDURE — 770020 HCHG ROOM/CARE - TELE (206)

## 2024-04-17 PROCEDURE — 700102 HCHG RX REV CODE 250 W/ 637 OVERRIDE(OP): Performed by: HOSPITALIST

## 2024-04-17 PROCEDURE — 700105 HCHG RX REV CODE 258: Performed by: INTERNAL MEDICINE

## 2024-04-17 RX ORDER — FUROSEMIDE 10 MG/ML
40 INJECTION INTRAMUSCULAR; INTRAVENOUS ONCE
Status: COMPLETED | OUTPATIENT
Start: 2024-04-17 | End: 2024-04-17

## 2024-04-17 RX ORDER — LINEZOLID 600 MG/1
600 TABLET, FILM COATED ORAL EVERY 12 HOURS
Status: DISCONTINUED | OUTPATIENT
Start: 2024-04-17 | End: 2024-04-19

## 2024-04-17 RX ADMIN — OXYCODONE HYDROCHLORIDE 10 MG: 10 TABLET ORAL at 17:04

## 2024-04-17 RX ADMIN — OXYCODONE HYDROCHLORIDE 5 MG: 5 TABLET ORAL at 14:18

## 2024-04-17 RX ADMIN — NICOTINE 14 MG: 14 PATCH, EXTENDED RELEASE TRANSDERMAL at 04:39

## 2024-04-17 RX ADMIN — LINEZOLID 600 MG: 600 TABLET, FILM COATED ORAL at 17:04

## 2024-04-17 RX ADMIN — OXYCODONE HYDROCHLORIDE 10 MG: 10 TABLET ORAL at 10:13

## 2024-04-17 RX ADMIN — HEPARIN SODIUM 5000 UNITS: 5000 INJECTION, SOLUTION INTRAVENOUS; SUBCUTANEOUS at 21:16

## 2024-04-17 RX ADMIN — OXYCODONE HYDROCHLORIDE 5 MG: 5 TABLET ORAL at 00:07

## 2024-04-17 RX ADMIN — LINEZOLID 600 MG: 600 TABLET, FILM COATED ORAL at 04:39

## 2024-04-17 RX ADMIN — SODIUM CHLORIDE 24 MILLION UNITS: 9 INJECTION, SOLUTION INTRAVENOUS at 16:06

## 2024-04-17 RX ADMIN — HEPARIN SODIUM 5000 UNITS: 5000 INJECTION, SOLUTION INTRAVENOUS; SUBCUTANEOUS at 04:39

## 2024-04-17 RX ADMIN — FUROSEMIDE 40 MG: 10 INJECTION INTRAMUSCULAR; INTRAVENOUS at 11:34

## 2024-04-17 RX ADMIN — OXYCODONE HYDROCHLORIDE 10 MG: 10 TABLET ORAL at 04:39

## 2024-04-17 RX ADMIN — HEPARIN SODIUM 5000 UNITS: 5000 INJECTION, SOLUTION INTRAVENOUS; SUBCUTANEOUS at 14:17

## 2024-04-17 ASSESSMENT — ENCOUNTER SYMPTOMS
INSOMNIA: 0
SHORTNESS OF BREATH: 0
FEVER: 0
NERVOUS/ANXIOUS: 0
HEARTBURN: 0
CHILLS: 0
CONSTIPATION: 0
COUGH: 0
SENSORY CHANGE: 0
MYALGIAS: 0
SPEECH CHANGE: 0
BLURRED VISION: 0
HEADACHES: 0
DIARRHEA: 0
MYALGIAS: 1
CLAUDICATION: 0
PHOTOPHOBIA: 0
DEPRESSION: 0
VOMITING: 0
ABDOMINAL PAIN: 0
WEAKNESS: 0
DIZZINESS: 0

## 2024-04-17 ASSESSMENT — FIBROSIS 4 INDEX: FIB4 SCORE: 1.24

## 2024-04-17 ASSESSMENT — PAIN DESCRIPTION - PAIN TYPE: TYPE: ACUTE PAIN

## 2024-04-17 NOTE — PROGRESS NOTES
Infectious Disease Progress Note    Author: Christopher Kendall M.D. Date & Time of service: 2024  1:45 PM    Chief Complaint:  Follow-up for cellulitis    Interval History:   patient remains afebrile and white count slightly down to 22.3, CT scan with curvilinear fluid collection lateral thigh but no obvious air    Labs Reviewed and Medications Reviewed.    Review of Systems:  Review of Systems   Constitutional:  Negative for chills and fever.   Musculoskeletal:  Positive for myalgias.   Skin:  Negative for rash.       Hemodynamics:  Temp (24hrs), Av.1 °C (98.7 °F), Min:36.7 °C (98.1 °F), Max:37.6 °C (99.7 °F)  Temperature: 36.7 °C (98.1 °F)  Pulse  Av.6  Min: 57  Max: 112   Blood Pressure: 110/62       Physical Exam:  Physical Exam  Vitals and nursing note reviewed.   Constitutional:       General: He is not in acute distress.     Appearance: He is not ill-appearing.   Eyes:      Conjunctiva/sclera: Conjunctivae normal.   Cardiovascular:      Rate and Rhythm: Normal rate.   Pulmonary:      Effort: Pulmonary effort is normal. No respiratory distress.      Breath sounds: No stridor.   Abdominal:      General: There is no distension.      Palpations: Abdomen is soft.   Musculoskeletal:         General: Swelling and tenderness present.      Comments: Left leg with improved swelling and more dusky erythema overall improved.  Erythema and swelling of the medial left thigh is about the same. However, proximal lateral thigh, groin with swelling and erythema and tenderness, mild firmness, hot to touch, induration over the groin.  Erythema has now progressed up towards the lateral abdominal wall.  Pictures taken.     Skin:     Findings: No erythema or rash.   Neurological:      General: No focal deficit present.      Mental Status: He is alert.     Approximately laterally now up to the lateral abdominal wall    Meds:    Current Facility-Administered Medications:     linezolid    penicillin G potassium 24  Million Units in  mL infusion    acetaminophen    senna-docusate **AND** polyethylene glycol/lytes    Notify provider if pain remains uncontrolled **AND** Use the Numeric Rating Scale (NRS), Green-Baker Faces (WBF), or FLACC on regular floors and Critical-Care Pain Observation Tool (CPOT) on ICUs/Trauma to assess pain **AND** Pulse Ox **AND** Pharmacy Consult Request **AND** If patient difficult to arouse and/or has respiratory depression (respiratory rate of 10 or less), stop any opiates that are currently infusing and call a Rapid Response.    oxyCODONE immediate-release **OR** oxyCODONE immediate-release **OR** HYDROmorphone    ondansetron    ondansetron    promethazine    promethazine    prochlorperazine    [DISCONTINUED] insulin regular **AND** [CANCELED] POC blood glucose manual result **AND** NOTIFY MD and PharmD **AND** Administer 20 grams of glucose (approximately 8 ounces of fruit juice) every 15 minutes PRN FSBG less than 70 mg/dL **AND** dextrose bolus    heparin    nicotine **AND** Nicotine Replacement Patient Education Materials **AND** nicotine polacrilex    Labs:  Recent Labs     04/15/24  1350 04/16/24 0235 04/17/24  0130   WBC 21.8* 27.3* 22.3*   RBC 4.02* 3.76* 3.93*   HEMOGLOBIN 12.2* 11.5* 11.9*   HEMATOCRIT 35.9* 33.8* 35.8*   MCV 89.3 89.9 91.1   MCH 30.3 30.6 30.3   RDW 43.5 44.3 46.4   PLATELETCT 217 219 184   MPV 10.7 10.8 11.6   NEUTSPOLYS 71.00  --   --    LYMPHOCYTES 7.00*  --   --    MONOCYTES 2.00  --   --    EOSINOPHILS 0.00  --   --    BASOPHILS 0.00  --   --    RBCMORPHOLO Present  --   --      Recent Labs     04/15/24  1350 04/16/24 0235 04/17/24  0130   SODIUM 136 134* 130*   POTASSIUM 3.2* 3.4* 3.4*   CHLORIDE 101 100 96   CO2 23 24 21   GLUCOSE 111* 114* 118*   BUN 15 16 15     Recent Labs     04/15/24  1350 04/16/24  0235 04/17/24  0130   ALBUMIN 1.8* 2.1*  --    CREATININE 0.77 0.76 0.81       Imaging:  CT-EXTREMITY, LOWER WITH LEFT    Result Date: 4/16/2024 4/16/2024  3:06 PM HISTORY/REASON FOR EXAM:  Worsening And Ascending Erythema/Cellulitis. TECHNIQUE/EXAM DESCRIPTION AND NUMBER OF VIEWS:  CT scan of the LEFT lower extremity with contrast, with reconstructions. Thin helical 3 mm sections were obtained from the distal femur through the proximal tibia/fibula. Sagittal and coronal multiplanar reconstructions were generated from the axial images. A total of 100 mL of Omnipaque 350 nonionic contrast was administered  IV without complication. Up to date radiation dose reduction adjustments have been utilized to meet ALARA standards for radiation dose reduction. COMPARISON: None. FINDINGS: There are curvilinear areas of fluid tissue attenuation involving the subcutaneous tissues of the left thigh and lower leg most pronounced involving the lateral aspect of the thigh where it measures 1.3 cm in greatest diameter. The muscles of the left thigh and lower leg have a normal appearance and enhancement pattern. There is no evidence of deep abscess formation. There is no evidence of acute osteomyelitis     1.  Subcutaneous inflammatory changes about the left lower leg and thigh suspicious for cellulitis. 2.  Curvilinear fluid attenuation adjacent to the musculature of the lateral aspect of the left thigh measuring 1.3 cm in greatest diameter suspicious for subcutaneous seroma less likely early abscess formation.    EC-ECHOCARDIOGRAM COMPLETE W/O CONT    Result Date: 4/15/2024  Transthoracic Echo Report Echocardiography Laboratory CONCLUSIONS No prior study is available for comparison. Normal left ventricular systolic function. The left ventricular ejection fraction is visually estimated to be 65%. Mild tricuspid regurgitation. Estimated right ventricular systolic pressure is 35 mmHg. JA FINLEY Exam Date:         04/15/2024                    15:05 Exam Location:     Inpatient Priority:          Routine Ordering Physician:        IVONE CARLTON                            TRIAGE  Referring Physician:       BRANDT Nguyen Sonographer:               Edin ALEJANDRO Age:    53     Gender:    M MRN:    [id number] :    1970 BSA:    2      Ht (in):    71     Wt (lb):    176 Exam Type:     Complete Indications:     Endocarditis ICD Codes:       421 CPT Codes:       36522 BP:   92     /   51     HR: Technical Quality:       Fair MEASUREMENTS  (Male / Female) Normal Values 2D ECHO LV Diastolic Diameter PLAX        5.1 cm                4.2 - 5.9 / 3.9 - 5.3 cm LV Systolic Diameter PLAX         3.5 cm                2.1 - 4.0 cm IVS Diastolic Thickness           0.95 cm               LVPW Diastolic Thickness          1 cm                  LVOT Diameter                     2.2 cm                Estimated LV Ejection Fraction    65 %                  LV Ejection Fraction MOD BP       66.2 %                >= 55  % LV Ejection Fraction MOD 4C       71.1 %                LV Ejection Fraction MOD 2C       60.2 %                DOPPLER AV Peak Velocity                  1.3 m/s               AV Peak Gradient                  6.9 mmHg              AV Mean Gradient                  3.9 mmHg              LVOT Peak Velocity                1.1 m/s               AV Area Cont Eq vti               2.8 cm2               MV Velocity Time Integral         18.1 cm               Mitral E Point Velocity           0.8 m/s               Mitral E to A Ratio               1.1                   MV Pressure Half Time             37.5 ms               MV Area PHT                       5.9 cm2               MV Deceleration Time              129 ms                TR Peak Velocity                  283 cm/s              PV Peak Velocity                  0.99 m/s              PV Peak Gradient                  3.9 mmHg              * Indicates values subject to auto-interpretation LV EF:  65    % FINDINGS Left Ventricle Normal left ventricular chamber size. Normal left ventricular wall thickness. Normal left ventricular systolic  function. The left ventricular ejection fraction is visually estimated to be 65%. Normal regional wall motion. Normal diastolic function. Right Ventricle The right ventricle is normal in size and systolic function. Right Atrium The right atrium is normal in size. Normal inferior vena cava size and inspiratory collapse. Left Atrium The left atrium is normal in size. Left atrial volume index is 23 mL/sq m. Mitral Valve Structurally normal mitral valve without significant stenosis or regurgitation. Aortic Valve Structurally normal aortic valve without significant stenosis or regurgitation. Tricuspid Valve Structurally normal tricuspid valve without significant stenosis. Mild tricuspid regurgitation. Right atrial pressure is estimated to be 3 mmHg. Estimated right ventricular systolic pressure is 35 mmHg. Pulmonic Valve Structurally normal pulmonic valve without significant stenosis or regurgitation. Pericardium No pericardial effusion. Aorta Normal aortic root for body surface area. The ascending aorta is borderline dilated with a diameter of  4.0 cm. Ricky Gaines M.D. (Electronically Signed) Final Date:     15 April 2024                 16:06    US-EXTREMITY VENOUS LOWER UNILAT LEFT    Result Date: 4/14/2024  CLINICAL INFORMATION: Left lower extremity pain/edema. PROCEDURE: Ultrasound examination of left lower extremity deep venous system was performed using grayscale, color and spectral Doppler in the ultrasound section. COMPARISON: None. FINDINGS: LEFT: The left common femoral, saphenofemoral junction, femoral, and popliteal veins demonstrate a normal response to augmentation and compression maneuvers. There is also a normal response to respiratory variation. The bifurcation of the common femoral vein into the superficial and deep femoral veins is visualized.  Flow is identified in these vessels with no evidence of intraluminal thrombus on either color Doppler or grayscale images. The visualized portions of the  left proximal calf veins are also normal.     No evidence of acute deep venous thrombosis in the visualized venous segments of the left lower extremity.     DX-CHEST-FOR LINE PLACEMENT Perform procedure in: Patient's Room    Result Date: 4/13/2024 4/13/2024 9:11 PM HISTORY/REASON FOR EXAM:  Other (Comment); line. TECHNIQUE/EXAM DESCRIPTION AND NUMBER OF VIEWS: Single view of the chest. COMPARISON: None FINDINGS: There has been interval insertion of a central venous catheter which terminates with the tip projecting over the expected region of the mid to distal superior vena cava. Heart size is within normal limits. No pulmonary infiltrates or consolidations are noted. No pleural abnormalities are noted.     1.  Interval insertion of a central venous catheter which terminates with the tip projecting over the expected region of the mid to distal superior vena cava. 2.  No acute cardiopulmonary.    CT-EXTREMITY, LOWER W/O LEFT    Result Date: 4/13/2024 4/13/2024 5:02 PM HISTORY/REASON FOR EXAM:  Leg swelling, body aches. TECHNIQUE/EXAM DESCRIPTION AND NUMBER OF VIEWS: CT scan of the LEFT lower extremity without contrast and including reconstructions. Thin-section noncontrast helical images were obtained. Coronal and sagittal reconstructions were generated from the axial images. Up to date radiation dose reduction adjustments have been utilized to meet ALARA standards for radiation dose reduction. COMPARISON: None. FINDINGS: There is extensive left lower extremity edema. Skin thickening and subcutaneous fat stranding is seen extending from the thigh into the calf, ankle and foot. No drainable fluid collections are seen on this noncontrast study. No acute fracture or dislocation. No significant osteoarthrosis. Visualized pelvic organs are unremarkable. Left inguinal and iliac lymphadenopathy. Lymph nodes measure up to 1.4 cm short axis in the left external iliac region.     1.  Left lower extremity edema versus  cellulitis. No drainable fluid collections on this noncontrast study. 2.  Enlarged left external iliac and inguinal lymph nodes. They may be reactive to the left lower extremity infection, however, neoplastic nodes are difficult to rule out. Close clinical follow-up is needed.    DX-CHEST-PORTABLE (1 VIEW)    Result Date: 4/13/2024 4/13/2024 4:19 PM HISTORY/REASON FOR EXAM:  Sepsis. TECHNIQUE/EXAM DESCRIPTION AND NUMBER OF VIEWS: Single portable view of the chest. COMPARISON:  None. FINDINGS: LUNGS: Clear. No effusions. PNEUMOTHORAX: None. LINES AND TUBES: None. MEDIASTINUM: No cardiomegaly. MUSCULOSKELETAL STRUCTURES: No acute displaced fracture.     No acute cardiopulmonary abnormality.       Micro:  Results       Procedure Component Value Units Date/Time    MRSA By PCR (Amp) [[id number]] Collected: 04/17/24 1144    Order Status: Sent Specimen: Respirate from Nares     Blood Culture - Draw one from central line and one from peripheral site [[id number]]  (Abnormal)  (Susceptibility) Collected: 04/13/24 1619    Order Status: Completed Specimen: Blood from Peripheral Updated: 04/16/24 0927     Significant Indicator POS     Source BLD     Site PERIPHERAL     Culture Result Growth detected by Bactec instrument. 04/14/2024  06:40      Beta Hemolytic Streptococcus group A  This isolate does NOT demonstrate inducible Clindamycin  resistance in vitro.      Narrative:      CALL  [facility]  MED tel. [phone],  CALLED  MED tel. [phone] 04/15/2024, 10:10, RB PERF. RESULTS CALLED  TO:José [id number] Rn  Right AC    Susceptibility       Beta hemolytic streptococcus group a (1)       Antibiotic Interpretation Microscan   Method Status    Penicillin Sensitive 0.047 mcg/mL E-TEST Final    Cefotaxime Sensitive 0.023 mcg/mL E-TEST Final    Clindamycin Sensitive - mcg/mL E-TEST Final                       Urine Culture (New) [[id number]] Collected: 04/13/24 2249    Order Status: Completed Specimen: Urine Updated: 04/16/24 0893      Significant Indicator NEG     Source UR     Site -     Culture Result No growth at 48 hours.    Narrative:      Indication for culture:->Emergency Room Patient    BLOOD CULTURE [[id number]] Collected: 04/15/24 1104    Order Status: Completed Specimen: Blood from Peripheral Updated: 04/16/24 0743     Significant Indicator NEG     Source BLD     Site PERIPHERAL     Culture Result No Growth  Note: Blood cultures are incubated for 5 days and  are monitored continuously.Positive blood cultures  are called to the RN and reported as soon as  they are identified.  Blood culture testing and Gram stain, if indicated, are  performed at [facility], [address] [place] [address].  Positive blood cultures are  sent to [facility], [address] [place] [address], for organism identification and  susceptibility testing.      Narrative:      Right Hand    BLOOD CULTURE [[id number]] Collected: 04/15/24 1104    Order Status: Completed Specimen: Blood from Peripheral Updated: 04/16/24 0743     Significant Indicator NEG     Source BLD     Site PERIPHERAL     Culture Result No Growth  Note: Blood cultures are incubated for 5 days and  are monitored continuously.Positive blood cultures  are called to the RN and reported as soon as  they are identified.  Blood culture testing and Gram stain, if indicated, are  performed at [facility], [address] [place] [address].  Positive blood cultures are  sent to [facility], [address] [place] [address], for organism identification and  susceptibility testing.      Narrative:      Left Hand    CoV-2, Flu A/B, And RSV by PCR (CepFullbridgeid) [[id number]] Collected: 04/14/24 1004    Order Status: Completed Specimen: Respirate from Nasopharyngeal Updated: 04/14/24 1052     Influenza virus A RNA Negative     Influenza virus B, PCR Negative     RSV, PCR Negative     SARS-CoV-2 by PCR NotDetected     Comment:  [facility] providers: PLEASE REFER TO DE-ESCALATION AND RETESTING PROTOCOL  on [email or web address]    **The [employer] GeneXpert Xpress SARS-CoV-2 RT-PCR Test has been made  available for use under the Emergency Use Authorization (EUA) only.          SARS-CoV-2 Source NP Swab    Blood Culture - Draw one from central line and one from peripheral site [[id number]] Collected: 04/13/24 1700    Order Status: Completed Specimen: Blood from Line Updated: 04/14/24 0742     Significant Indicator NEG     Source BLD     Site LINE     Culture Result No Growth  Note: Blood cultures are incubated for 5 days and  are monitored continuously.Positive blood cultures  are called to the RN and reported as soon as  they are identified.  Blood culture testing and Gram stain, if indicated, are  performed at [facility], [address] [place] [address].  Positive blood cultures are  sent to [facility], [address] [place] [address], for organism identification and  susceptibility testing.      Narrative:      Left AC    Urinalysis [[id number]]  (Abnormal) Collected: 04/13/24 2249    Order Status: Completed Specimen: Urine Updated: 04/14/24 0005     Color Yellow     Character Hazy     Specific Gravity 1.025     Ph 5.5     Glucose Negative mg/dL      Ketones Trace mg/dL      Protein 30 mg/dL      Bilirubin Negative     Nitrite Negative     Leukocyte Esterase Negative     Occult Blood Negative     Micro Urine Req Microscopic            Hospital Course/Assessment:   Oneal Tillman is a 53 y.o. male with with prior tobacco dependence, at risk for diabetes, admitted with left lower extremity cellulitis and group A strep bacteremia, penicillin susceptible.     Pertinent Diagnoses:  Group A strep bacteremia, penicillin susceptible  Left lower extremity cellulitis, worse   Leukocytosis, worse     Plan:   -While the leg has improved clinically, the medial and especially the lateral thigh has worsened with  erythema now spreading proximally to the lateral abdominal wall.  This is occurring despite appropriate dual antimicrobial therapy.  Patient also with overall worsening leukocytosis. His CT scan with fluid collection lateral thigh which in this clinical setting likely represents an abscess or at least a developing abscess.  Recommend surgical assessment for I&D  -Continue IV penicillin and linezolid for now  -Follow repeat blood cultures x 2  -HIV and hepatitis C antibodies negative     Disposition: Unable to determine at this time  Need for PICC line: Unable to determine at this time     Plan was discussed with the primary team, Dr Zendejas. ID will follow.  This illness poses a threat to life and limb function

## 2024-04-17 NOTE — PROGRESS NOTES
Hospital Medicine Daily Progress Note    Date of Service  4/17/2024    Chief Complaint  Oneal Tillman is a 53 y.o. male admitted 4/13/2024 with left leg pain and erythema    Hospital Course  Patient is a 53-year-old male with diabetes and tobacco dependence who presented with leg swelling of the left leg for 1 week and body aches.  Questionable spider bite on the left leg after he changed his pants and then 2 days later started having worsening pain redness and swelling.  Also noticed chills palpitations weakness.  Tachycardic with a heart rate of 104 and low blood pressure.  His initial lactic acid was 4.3 and white count was 15.  Patient was initially admitted and given fluid resuscitation and started on IV antibiotics.  Blood cultures came back positive for group B strep and infectious disease recommended in addition to continuation of Zyvox to add pen G.  He is having worsening left leg erythema and ascending rash with blistering lesions.  Repeat CT left leg pending.    Interval Problem Update  4/16 patient states he is having significant pain in the left leg, thinks he feels his thigh is softer but he still having excruciating pain.  Denies any fevers overnight but is diaphoretic at times.  Patient now with bulla in the left groin and significant scrotal edema.  IV fluids discontinued as his acute kidney injury has resolved, single dose IV Lasix ordered, repeat CT left lower extremity per infectious disease recommendations to make sure there is not significant progression or gas producing lesion.  4/17 patient seen this morning and then later discussed again with infectious disease this afternoon.  On my evaluation his left leg was less edematous and less erythematous.  Throughout the afternoon however ID has noticed that the erythema and slight edema has now affected his left upper thigh all the way to the left lower abdomen.  WBC count is slightly better.  Patient has been afebrile.  The CT leg showed a very  small fluid collection which likely is not amenable at this time for surgical intervention or aspiration of the area.  Again low threshold to reconsult surgery if there is any worsening of the patient's status however we will continue with antibiotics and will give another dose of diuresis.    I have discussed this patient's plan of care and discharge plan at IDT rounds today with Case Management, Nursing, Nursing leadership, and other members of the IDT team.    Consultants/Specialty  infectious disease    Code Status  Full Code    Disposition  The patient is not medically cleared for discharge to home or a post-acute facility.  Anticipate discharge to: home with close outpatient follow-up    I have placed the appropriate orders for post-discharge needs.    Review of Systems  Review of Systems   Constitutional:  Negative for chills and fever.   HENT:  Negative for congestion.    Eyes:  Negative for blurred vision and photophobia.   Respiratory:  Negative for cough and shortness of breath.    Cardiovascular:  Positive for leg swelling (Left leg and scrotal swelling). Negative for chest pain and claudication.   Gastrointestinal:  Negative for abdominal pain, constipation, diarrhea, heartburn and vomiting.   Genitourinary:  Negative for dysuria and hematuria.   Musculoskeletal:  Positive for joint pain. Negative for myalgias.   Skin:  Positive for rash. Negative for itching.   Neurological:  Negative for dizziness, sensory change, speech change, weakness and headaches.   Psychiatric/Behavioral:  Negative for depression. The patient is not nervous/anxious and does not have insomnia.         Physical Exam  Temp:  [36.7 °C (98.1 °F)-37.6 °C (99.7 °F)] 37.6 °C (99.7 °F)  Pulse:  [89-99] 89  Resp:  [16-20] 18  BP: (102-123)/(46-64) 115/53  SpO2:  [93 %-95 %] 94 %    Physical Exam  Vitals and nursing note reviewed.   Constitutional:       General: He is not in acute distress.     Appearance: Normal appearance.   HENT:       Head: Normocephalic and atraumatic.   Eyes:      General: No scleral icterus.     Extraocular Movements: Extraocular movements intact.   Cardiovascular:      Rate and Rhythm: Normal rate and regular rhythm.      Pulses: Normal pulses.      Heart sounds: Normal heart sounds. No murmur heard.  Pulmonary:      Effort: Pulmonary effort is normal. No respiratory distress.      Breath sounds: Normal breath sounds. No wheezing, rhonchi or rales.   Abdominal:      General: Abdomen is flat. Bowel sounds are normal. There is no distension.      Palpations: Abdomen is soft.      Tenderness: There is no rebound.   Genitourinary:     Comments: Scrotal edema improved.    Musculoskeletal:         General: No swelling or tenderness.      Cervical back: Normal range of motion and neck supple.      Left lower leg: Edema (slightly improved) present.   Lymphadenopathy:      Cervical: No cervical adenopathy.   Skin:     General: Skin is warm.      Coloration: Skin is not jaundiced.      Findings: Bruising (bruising left popliteal area improving, less edmatious.) present. No erythema.   Neurological:      General: No focal deficit present.      Mental Status: He is alert and oriented to person, place, and time. Mental status is at baseline.      Cranial Nerves: No cranial nerve deficit.   Psychiatric:         Mood and Affect: Mood normal.         Behavior: Behavior normal.         Fluids    Intake/Output Summary (Last 24 hours) at 4/17/2024 1646  Last data filed at 4/17/2024 1602  Gross per 24 hour   Intake 1560 ml   Output 5875 ml   Net -4315 ml       Laboratory  Recent Labs     04/15/24  1350 04/16/24  0235 04/17/24  0130   WBC 21.8* 27.3* 22.3*   RBC 4.02* 3.76* 3.93*   HEMOGLOBIN 12.2* 11.5* 11.9*   HEMATOCRIT 35.9* 33.8* 35.8*   MCV 89.3 89.9 91.1   MCH 30.3 30.6 30.3   MCHC 34.0 34.0 33.2   RDW 43.5 44.3 46.4   PLATELETCT 217 219 184   MPV 10.7 10.8 11.6     Recent Labs     04/15/24  1350 04/16/24  0235 04/17/24  0130   SODIUM 136  134* 130*   POTASSIUM 3.2* 3.4* 3.4*   CHLORIDE 101 100 96   CO2 23 24 21   GLUCOSE 111* 114* 118*   BUN 15 16 15   CREATININE 0.77 0.76 0.81   CALCIUM 7.9* 7.8* 7.4*                   Imaging  CT-EXTREMITY, LOWER WITH LEFT   Final Result      1.  Subcutaneous inflammatory changes about the left lower leg and thigh suspicious for cellulitis.      2.  Curvilinear fluid attenuation adjacent to the musculature of the lateral aspect of the left thigh measuring 1.3 cm in greatest diameter suspicious for subcutaneous seroma less likely early abscess formation.      EC-ECHOCARDIOGRAM COMPLETE W/O CONT   Final Result      US-EXTREMITY VENOUS LOWER UNILAT LEFT   Final Result         No evidence of acute deep venous thrombosis in the visualized venous segments of the left lower extremity.         DX-CHEST-FOR LINE PLACEMENT Perform procedure in: Patient's Room   Final Result      1.  Interval insertion of a central venous catheter which terminates with the tip projecting over the expected region of the mid to distal superior vena cava.   2.  No acute cardiopulmonary.      CT-EXTREMITY, LOWER W/O LEFT   Final Result      1.  Left lower extremity edema versus cellulitis. No drainable fluid collections on this noncontrast study.   2.  Enlarged left external iliac and inguinal lymph nodes. They may be reactive to the left lower extremity infection, however, neoplastic nodes are difficult to rule out. Close clinical follow-up is needed.      DX-CHEST-PORTABLE (1 VIEW)   Final Result      No acute cardiopulmonary abnormality.              Assessment/Plan  * Septic shock (HCC)- (present on admission)  Assessment & Plan  This is Sepsis Present on admission  SIRS criteria identified on my evaluation include: Tachycardia, with heart rate greater than 90 BPM, Leukocytosis, with WBC greater than 12,000, and Bandemia, greater than 10% bands  Clinical indicators of end organ dysfunction include Hypotension with systolic blood pressure less  than 90 or MAP less than 65, Hypotension with decrease in systolic blood pressure of more than 40mmHg, and Lactic Acid greater than 2  Source is cellulitis  Sepsis protocol initiated  Crystalloid Fluid Administration: Fluid resuscitation ordered per standard protocol - 30 mL/kg per current or ideal body weight  IV antibiotics as appropriate for source of sepsis  Reassessment: I have reassessed the patient's hemodynamic status     WBC 22.3  - I discussed with ID pharmacy, I recommended converting antibiotics to continue linezolid, start pen G 24,000,000 units  - I consulted ID with Dr. Kendall, appreciate recommendations    Hypokalemia- (present on admission)  Assessment & Plan  K 3.4, replacing  PO    Hypophosphatemia- (present on admission)  Assessment & Plan  Phos 1.6 replacing with IV K-Phos    Bacteremia due to Streptococcus- (present on admission)  Assessment & Plan  4/13/24 blood culture positive for strep group A  - Continue linezolid, changing to pen G  - I consulted ID  - I ordered echocardiogram -which did not show any signs of vegetations  -Repeat CT left lower extremity shows small questionable fluid collection but more consistent with cellulitis.  Erythema left side abdomen now but leg and groin look much better.  Low threshold to reconsult general surgery    Metabolic acidosis- (present on admission)  Assessment & Plan  Likely due to reduced intravascular volume and secondary to sepsis.  Continue IV fluids  Bicarb 23    Hyponatremia- (present on admission)  Assessment & Plan  Hypovolemic hyponatremia   Continue IV fluids  Sodium 136    Dehydration- (present on admission)  Assessment & Plan  Resolved, IV diuresis prn      Acute kidney injury (HCC)- (present on admission)  Assessment & Plan  Resolved, now with volume overload, single dose Lasix    Tobacco dependence- (present on admission)  Assessment & Plan  Counseled on admission    Hyperglycemia- (present on admission)  Assessment & Plan  With  marked hyperglycemia  Continue SSI, Accu-Cheks and hypoglycemia protocol    Cellulitis of left lower extremity- (present on admission)  Assessment & Plan  - I discussed with ID pharmacy, I recommended converting antibiotics to continue linezolid, start pen G 24,000,000 units  Discussed with Dr. Kendall, appreciate eval         VTE prophylaxis:   SCDs/TEDs      I have performed a physical exam and reviewed and updated ROS and Plan today (4/17/2024). In review of yesterday's note (4/16/2024), there are no changes except as documented above.

## 2024-04-17 NOTE — CARE PLAN
The patient is Stable - Low risk of patient condition declining or worsening    Shift Goals  Clinical Goals: IV abx, pain management  Patient Goals: pain management  Family Goals: No family present    Progress made toward(s) clinical / shift goals:    Problem: Hemodynamics  Goal: Patient's hemodynamics, fluid balance and neurologic status will be stable or improve  Outcome: Progressing  Note: WBC trending down from 27 to 22     Problem: Pain - Standard  Goal: Alleviation of pain or a reduction in pain to the patient’s comfort goal  Note: PO analgesics are controlling pain adequately.        Patient is not progressing towards the following goals:

## 2024-04-17 NOTE — CARE PLAN
The patient is Watcher - Medium risk of patient condition declining or worsening    Shift Goals  Clinical Goals: stable BP, pain control, improvement in leg swelling  Patient Goals: rest, pain control  Family Goals: No family present    Progress made toward(s) clinical / shift goals:      Problem: Pain - Standard  Goal: Alleviation of pain or a reduction in pain to the patient’s comfort goal  Outcome: Progressing   Pt reports pain to left leg cellulite site. PRN oxycodone given. Elevating leg on pillows. Ice packs used.    Problem: Knowledge Deficit - Standard  Goal: Patient and family/care givers will demonstrate understanding of plan of care, disease process/condition, diagnostic tests and medications  Outcome: Progressing       Patient is not progressing towards the following goals:

## 2024-04-17 NOTE — PROGRESS NOTES
Telemetry Shift Summary    Rhythm SR; 1st AVB  HR Range 84-90  Ectopy R-PAC  Measurements 0.22/0.08/0.40        Normal Values  Rhythm SR  HR Range    Measurements 0.12-0.20 / 0.06-0.10  / 0.30-0.52

## 2024-04-18 LAB
ANION GAP SERPL CALC-SCNC: 10 MMOL/L (ref 7–16)
BACTERIA BLD CULT: NORMAL
BASOPHILS # BLD AUTO: 0 % (ref 0–1.8)
BASOPHILS # BLD: 0 K/UL (ref 0–0.12)
BUN SERPL-MCNC: 15 MG/DL (ref 8–22)
CALCIUM SERPL-MCNC: 7.3 MG/DL (ref 8.4–10.2)
CHLORIDE SERPL-SCNC: 98 MMOL/L (ref 96–112)
CO2 SERPL-SCNC: 26 MMOL/L (ref 20–33)
CREAT SERPL-MCNC: 0.8 MG/DL (ref 0.5–1.4)
EOSINOPHIL # BLD AUTO: 0.32 K/UL (ref 0–0.51)
EOSINOPHIL NFR BLD: 1 % (ref 0–6.9)
ERYTHROCYTE [DISTWIDTH] IN BLOOD BY AUTOMATED COUNT: 44.8 FL (ref 35.9–50)
GFR SERPLBLD CREATININE-BSD FMLA CKD-EPI: 105 ML/MIN/1.73 M 2
GLUCOSE SERPL-MCNC: 99 MG/DL (ref 65–99)
HCT VFR BLD AUTO: 34.1 % (ref 42–52)
HGB BLD-MCNC: 11.4 G/DL (ref 14–18)
LYMPHOCYTES # BLD AUTO: 5.99 K/UL (ref 1–4.8)
LYMPHOCYTES NFR BLD: 19 % (ref 22–41)
MANUAL DIFF BLD: NORMAL
MCH RBC QN AUTO: 29.8 PG (ref 27–33)
MCHC RBC AUTO-ENTMCNC: 33.4 G/DL (ref 32.3–36.5)
MCV RBC AUTO: 89.3 FL (ref 81.4–97.8)
MONOCYTES # BLD AUTO: 1.58 K/UL (ref 0–0.85)
MONOCYTES NFR BLD AUTO: 5 % (ref 0–13.4)
NEUTROPHILS # BLD AUTO: 23.63 K/UL (ref 1.82–7.42)
NEUTROPHILS NFR BLD: 69 % (ref 44–72)
NEUTS BAND NFR BLD MANUAL: 6 % (ref 0–10)
NRBC # BLD AUTO: 0 K/UL
NRBC BLD-RTO: 0 /100 WBC (ref 0–0.2)
PLATELET # BLD AUTO: 265 K/UL (ref 164–446)
PLATELET BLD QL SMEAR: NORMAL
PMV BLD AUTO: 10.5 FL (ref 9–12.9)
POTASSIUM SERPL-SCNC: 3.6 MMOL/L (ref 3.6–5.5)
RBC # BLD AUTO: 3.82 M/UL (ref 4.7–6.1)
RBC BLD AUTO: NORMAL
SIGNIFICANT IND 70042: NORMAL
SITE SITE: NORMAL
SODIUM SERPL-SCNC: 134 MMOL/L (ref 135–145)
SOURCE SOURCE: NORMAL
WBC # BLD AUTO: 31.5 K/UL (ref 4.8–10.8)

## 2024-04-18 PROCEDURE — 700105 HCHG RX REV CODE 258: Performed by: INTERNAL MEDICINE

## 2024-04-18 PROCEDURE — 85007 BL SMEAR W/DIFF WBC COUNT: CPT

## 2024-04-18 PROCEDURE — 94760 N-INVAS EAR/PLS OXIMETRY 1: CPT

## 2024-04-18 PROCEDURE — 80048 BASIC METABOLIC PNL TOTAL CA: CPT

## 2024-04-18 PROCEDURE — A9270 NON-COVERED ITEM OR SERVICE: HCPCS | Performed by: HOSPITALIST

## 2024-04-18 PROCEDURE — 770020 HCHG ROOM/CARE - TELE (206)

## 2024-04-18 PROCEDURE — 99407 BEHAV CHNG SMOKING > 10 MIN: CPT

## 2024-04-18 PROCEDURE — 700102 HCHG RX REV CODE 250 W/ 637 OVERRIDE(OP): Performed by: HOSPITALIST

## 2024-04-18 PROCEDURE — 85027 COMPLETE CBC AUTOMATED: CPT

## 2024-04-18 PROCEDURE — 99232 SBSQ HOSP IP/OBS MODERATE 35: CPT | Performed by: INTERNAL MEDICINE

## 2024-04-18 PROCEDURE — 700111 HCHG RX REV CODE 636 W/ 250 OVERRIDE (IP): Mod: JZ | Performed by: INTERNAL MEDICINE

## 2024-04-18 PROCEDURE — 700111 HCHG RX REV CODE 636 W/ 250 OVERRIDE (IP): Performed by: INTERNAL MEDICINE

## 2024-04-18 PROCEDURE — 700102 HCHG RX REV CODE 250 W/ 637 OVERRIDE(OP): Performed by: INTERNAL MEDICINE

## 2024-04-18 PROCEDURE — A9270 NON-COVERED ITEM OR SERVICE: HCPCS | Performed by: INTERNAL MEDICINE

## 2024-04-18 PROCEDURE — 700111 HCHG RX REV CODE 636 W/ 250 OVERRIDE (IP): Performed by: HOSPITALIST

## 2024-04-18 RX ORDER — FUROSEMIDE 10 MG/ML
40 INJECTION INTRAMUSCULAR; INTRAVENOUS ONCE
Status: COMPLETED | OUTPATIENT
Start: 2024-04-18 | End: 2024-04-18

## 2024-04-18 RX ADMIN — OXYCODONE HYDROCHLORIDE 10 MG: 10 TABLET ORAL at 19:26

## 2024-04-18 RX ADMIN — LINEZOLID 600 MG: 600 TABLET, FILM COATED ORAL at 16:48

## 2024-04-18 RX ADMIN — NICOTINE 14 MG: 14 PATCH, EXTENDED RELEASE TRANSDERMAL at 05:09

## 2024-04-18 RX ADMIN — OXYCODONE HYDROCHLORIDE 10 MG: 10 TABLET ORAL at 08:25

## 2024-04-18 RX ADMIN — FUROSEMIDE 40 MG: 10 INJECTION INTRAMUSCULAR; INTRAVENOUS at 13:10

## 2024-04-18 RX ADMIN — SODIUM CHLORIDE 24 MILLION UNITS: 9 INJECTION, SOLUTION INTRAVENOUS at 15:56

## 2024-04-18 RX ADMIN — OXYCODONE HYDROCHLORIDE 10 MG: 10 TABLET ORAL at 14:29

## 2024-04-18 RX ADMIN — OXYCODONE HYDROCHLORIDE 10 MG: 10 TABLET ORAL at 23:55

## 2024-04-18 RX ADMIN — OXYCODONE HYDROCHLORIDE 10 MG: 10 TABLET ORAL at 00:59

## 2024-04-18 RX ADMIN — HEPARIN SODIUM 5000 UNITS: 5000 INJECTION, SOLUTION INTRAVENOUS; SUBCUTANEOUS at 13:09

## 2024-04-18 RX ADMIN — HEPARIN SODIUM 5000 UNITS: 5000 INJECTION, SOLUTION INTRAVENOUS; SUBCUTANEOUS at 05:08

## 2024-04-18 RX ADMIN — HEPARIN SODIUM 5000 UNITS: 5000 INJECTION, SOLUTION INTRAVENOUS; SUBCUTANEOUS at 21:03

## 2024-04-18 RX ADMIN — OXYCODONE HYDROCHLORIDE 10 MG: 10 TABLET ORAL at 05:14

## 2024-04-18 RX ADMIN — LINEZOLID 600 MG: 600 TABLET, FILM COATED ORAL at 05:09

## 2024-04-18 ASSESSMENT — PAIN DESCRIPTION - PAIN TYPE
TYPE: ACUTE PAIN

## 2024-04-18 ASSESSMENT — ENCOUNTER SYMPTOMS
BLURRED VISION: 0
NERVOUS/ANXIOUS: 0
FEVER: 0
CHILLS: 0
HEARTBURN: 0
SPEECH CHANGE: 0
CONSTIPATION: 0
ABDOMINAL PAIN: 0
CLAUDICATION: 0
DEPRESSION: 0
DIZZINESS: 0
PHOTOPHOBIA: 0
WEAKNESS: 0
DIARRHEA: 0
SENSORY CHANGE: 0
COUGH: 0
HEADACHES: 0
SHORTNESS OF BREATH: 0
MYALGIAS: 0
VOMITING: 0
INSOMNIA: 0

## 2024-04-18 ASSESSMENT — FIBROSIS 4 INDEX: FIB4 SCORE: 0.86

## 2024-04-18 NOTE — PROGRESS NOTES
Hospital Medicine Daily Progress Note    Date of Service  4/18/2024    Chief Complaint  Oneal Tillman is a 53 y.o. male admitted 4/13/2024 with left leg pain and erythema    Hospital Course  Patient is a 53-year-old male with diabetes and tobacco dependence who presented with leg swelling of the left leg for 1 week and body aches.  Questionable spider bite on the left leg after he changed his pants and then 2 days later started having worsening pain redness and swelling.  Also noticed chills palpitations weakness.  Tachycardic with a heart rate of 104 and low blood pressure.  His initial lactic acid was 4.3 and white count was 15.  Patient was initially admitted and given fluid resuscitation and started on IV antibiotics.  Blood cultures came back positive for group B strep and infectious disease recommended in addition to continuation of Zyvox to add pen G.  He is having worsening left leg erythema and ascending rash with blistering lesions.  Repeat CT left leg pending.    Interval Problem Update  4/16 patient states he is having significant pain in the left leg, thinks he feels his thigh is softer but he still having excruciating pain.  Denies any fevers overnight but is diaphoretic at times.  Patient now with bulla in the left groin and significant scrotal edema.  IV fluids discontinued as his acute kidney injury has resolved, single dose IV Lasix ordered, repeat CT left lower extremity per infectious disease recommendations to make sure there is not significant progression or gas producing lesion.  4/17 patient seen this morning and then later discussed again with infectious disease this afternoon.  On my evaluation his left leg was less edematous and less erythematous.  Throughout the afternoon however ID has noticed that the erythema and slight edema has now affected his left upper thigh all the way to the left lower abdomen.  WBC count is slightly better.  Patient has been afebrile.  The CT leg showed a very  small fluid collection which likely is not amenable at this time for surgical intervention or aspiration of the area.  Again low threshold to reconsult surgery if there is any worsening of the patient's status however we will continue with antibiotics and will give another dose of diuresis.  4/18 patient doing about the same from a physical standpoint erythema remains up to the left flank, patient overall is feeling better but given his elevated WBC, ID recommended general surgery reconsultation.  General surgery does not feel that intervention from their standpoint is appropriate at this time.  If there is an attainable fluid collection aspiration recommended.  WBC count up to 31.5 today.  Patient is very frustrated with his current plan of care given the possibility of upcoming surgery and the fact that he was labeled to be diabetic because of elevated blood glucose.  As surgery is not happening today regular diet has been reinitiated.    I have discussed this patient's plan of care and discharge plan at IDT rounds today with Case Management, Nursing, Nursing leadership, and other members of the IDT team.    Consultants/Specialty  infectious disease  General surgery    Code Status  Full Code    Disposition  The patient is not medically cleared for discharge to home or a post-acute facility.  Anticipate discharge to: home with close outpatient follow-up    I have placed the appropriate orders for post-discharge needs.    Review of Systems  Review of Systems   Constitutional:  Negative for chills and fever.   HENT:  Negative for congestion.    Eyes:  Negative for blurred vision and photophobia.   Respiratory:  Negative for cough and shortness of breath.    Cardiovascular:  Positive for leg swelling (Left leg and scrotal swelling). Negative for chest pain and claudication.   Gastrointestinal:  Negative for abdominal pain, constipation, diarrhea, heartburn and vomiting.   Genitourinary:  Negative for dysuria and  hematuria.   Musculoskeletal:  Negative for joint pain and myalgias.   Skin:  Positive for rash. Negative for itching.   Neurological:  Negative for dizziness, sensory change, speech change, weakness and headaches.   Psychiatric/Behavioral:  Negative for depression. The patient is not nervous/anxious and does not have insomnia.         Physical Exam  Temp:  [36.3 °C (97.3 °F)-37.6 °C (99.7 °F)] 36.3 °C (97.3 °F)  Pulse:  [] 91  Resp:  [18] 18  BP: (106-124)/(53-68) 124/68  SpO2:  [91 %-94 %] 94 %    Physical Exam  Vitals and nursing note reviewed.   Constitutional:       General: He is not in acute distress.     Appearance: Normal appearance.   HENT:      Head: Normocephalic and atraumatic.   Eyes:      General: No scleral icterus.     Extraocular Movements: Extraocular movements intact.   Cardiovascular:      Rate and Rhythm: Normal rate and regular rhythm.      Pulses: Normal pulses.      Heart sounds: Normal heart sounds. No murmur heard.  Pulmonary:      Effort: Pulmonary effort is normal. No respiratory distress.      Breath sounds: Normal breath sounds. No wheezing, rhonchi or rales.   Abdominal:      General: Abdomen is flat. Bowel sounds are normal. There is no distension.      Palpations: Abdomen is soft.      Tenderness: There is no rebound.      Comments: Erythema now present left lower abdomen/flank   Genitourinary:     Comments: Scrotal edema improved.    Musculoskeletal:         General: No swelling or tenderness.      Cervical back: Normal range of motion and neck supple.      Left lower leg: Edema (slightly improved) present.   Lymphadenopathy:      Cervical: No cervical adenopathy.   Skin:     General: Skin is warm.      Coloration: Skin is not jaundiced.      Findings: Bruising (bruising left popliteal area improving, less edmatious.) present. No erythema.   Neurological:      General: No focal deficit present.      Mental Status: He is alert and oriented to person, place, and time. Mental  status is at baseline.      Cranial Nerves: No cranial nerve deficit.   Psychiatric:         Mood and Affect: Mood normal.         Behavior: Behavior normal.         Fluids    Intake/Output Summary (Last 24 hours) at 4/18/2024 1539  Last data filed at 4/18/2024 1142  Gross per 24 hour   Intake 480 ml   Output 1925 ml   Net -1445 ml       Laboratory  Recent Labs     04/16/24  0235 04/17/24  0130 04/18/24  0100   WBC 27.3* 22.3* 31.5*   RBC 3.76* 3.93* 3.82*   HEMOGLOBIN 11.5* 11.9* 11.4*   HEMATOCRIT 33.8* 35.8* 34.1*   MCV 89.9 91.1 89.3   MCH 30.6 30.3 29.8   MCHC 34.0 33.2 33.4   RDW 44.3 46.4 44.8   PLATELETCT 219 184 265   MPV 10.8 11.6 10.5     Recent Labs     04/16/24  0235 04/17/24  0130 04/18/24  0100   SODIUM 134* 130* 134*   POTASSIUM 3.4* 3.4* 3.6   CHLORIDE 100 96 98   CO2 24 21 26   GLUCOSE 114* 118* 99   BUN 16 15 15   CREATININE 0.76 0.81 0.80   CALCIUM 7.8* 7.4* 7.3*                   Imaging  CT-EXTREMITY, LOWER WITH LEFT   Final Result      1.  Subcutaneous inflammatory changes about the left lower leg and thigh suspicious for cellulitis.      2.  Curvilinear fluid attenuation adjacent to the musculature of the lateral aspect of the left thigh measuring 1.3 cm in greatest diameter suspicious for subcutaneous seroma less likely early abscess formation.      EC-ECHOCARDIOGRAM COMPLETE W/O CONT   Final Result      US-EXTREMITY VENOUS LOWER UNILAT LEFT   Final Result         No evidence of acute deep venous thrombosis in the visualized venous segments of the left lower extremity.         DX-CHEST-FOR LINE PLACEMENT Perform procedure in: Patient's Room   Final Result      1.  Interval insertion of a central venous catheter which terminates with the tip projecting over the expected region of the mid to distal superior vena cava.   2.  No acute cardiopulmonary.      CT-EXTREMITY, LOWER W/O LEFT   Final Result      1.  Left lower extremity edema versus cellulitis. No drainable fluid collections on this  noncontrast study.   2.  Enlarged left external iliac and inguinal lymph nodes. They may be reactive to the left lower extremity infection, however, neoplastic nodes are difficult to rule out. Close clinical follow-up is needed.      DX-CHEST-PORTABLE (1 VIEW)   Final Result      No acute cardiopulmonary abnormality.              Assessment/Plan  * Septic shock (HCC)- (present on admission)  Assessment & Plan  This is Sepsis Present on admission  SIRS criteria identified on my evaluation include: Tachycardia, with heart rate greater than 90 BPM, Leukocytosis, with WBC greater than 12,000, and Bandemia, greater than 10% bands  Clinical indicators of end organ dysfunction include Hypotension with systolic blood pressure less than 90 or MAP less than 65, Hypotension with decrease in systolic blood pressure of more than 40mmHg, and Lactic Acid greater than 2  Source is cellulitis  Sepsis protocol initiated  Crystalloid Fluid Administration: Fluid resuscitation ordered per standard protocol - 30 mL/kg per current or ideal body weight  IV antibiotics as appropriate for source of sepsis  Reassessment: I have reassessed the patient's hemodynamic status     WBC 31.5  - I discussed with ID pharmacy, I recommended converting antibiotics to continue linezolid, pen G 24,000,000 units  - I consulted ID with Dr. Kendall, appreciate recommendations  -- Per ID urgent surgical consultation requested, discussed with Dr. Samuel, no need for surgical involvement at this time if there is concern of an abscess then recommends needle aspiration.    Hypokalemia- (present on admission)  Assessment & Plan  K 3.4, replacing  PO    Hypophosphatemia- (present on admission)  Assessment & Plan  Phos 1.6 replacing with IV K-Phos    Bacteremia due to Streptococcus- (present on admission)  Assessment & Plan  4/13/24 blood culture positive for strep group A  4/15  -cultures negative x 2  - Continue linezolid, adding to pen G  - I consulted ID  - I  ordered echocardiogram -which did not show any signs of vegetations  -Repeat CT left lower extremity shows small questionable fluid collection but more consistent with cellulitis.  Erythema left side abdomen now but leg and groin look much better.  Appreciate general surgery reconsultation    Metabolic acidosis- (present on admission)  Assessment & Plan  Likely due to reduced intravascular volume and secondary to sepsis.  Continue IV fluids  Bicarb 23    Hyponatremia- (present on admission)  Assessment & Plan  Hypovolemic hyponatremia   Continue IV fluids  Sodium 136    Dehydration- (present on admission)  Assessment & Plan  Resolved, IV diuresis prn      Acute kidney injury (HCC)- (present on admission)  Assessment & Plan  Resolved, now with volume overload, continue as needed Lasix    Tobacco dependence- (present on admission)  Assessment & Plan  Counseled on admission    Hyperglycemia- (present on admission)  Assessment & Plan  With marked hyperglycemia  Continue SSI, Accu-Cheks and hypoglycemia protocol    Cellulitis of left lower extremity- (present on admission)  Assessment & Plan  - I discussed with ID pharmacy, I recommended converting antibiotics to continue linezolid, pen G 24,000,000 units  Discussed with Dr. Kendall, appreciate eval, appreciate surgical evaluation as well  Significant elevation in WBC count today, afebrile, patient states overall he is feeling better         VTE prophylaxis:   SCDs/TEDs      I have performed a physical exam and reviewed and updated ROS and Plan today (4/18/2024). In review of yesterday's note (4/17/2024), there are no changes except as documented above.

## 2024-04-18 NOTE — RESPIRATORY CARE
"   EDUCATION by COPD CLINICAL EDUCATOR  4/18/2024 at 4:25 PM by Bernie Duarte, RRT     Patient interviewed by education team. Patient does not have a history or diagnosis of COPD. Patient is a smoker.  Therefore, smoking cessation education refused.    COPD Screen  COPD Risk Screening  Do you have a history of COPD?: No (NO HX DX COPD< SMOKER)    COPD Assessment  COPD Clinical Specialists ONLY  COPD Education Initiated: Yes--Short Intervention (Pt has no hx dx COPD, pt is a smoker but was not concerned about his smoking and did not want any education, Pt hx/ admit -  diabetes, leg swelling, body aches. Questionable spider bite, worsening pain redness and leg swelling.)  Is this a COPD exacerbation patient?: No  DME Company: NONE  Physician Name: none  Pulmonologist Name: none  Referrals Initiated: Yes  Pulmonary Rehab: N/A  Smoking Cessation: Yes  $ Smoking Cessation >10 Minutes: Symptomatic  Smoking Pack Years: pt did not want to answer questions or comment on smoking habit  Hospice: N/A  Home Health Care: N/A  Mobile Urgent Care Services: N/A  Geriatric Specialty Group: N/A  Private In-Home Care Agency: N/A  Interdisciplinary Rounds: Attendance at Rounds (30 Min)    PFT Results    No results found for: \"PFT\"    Meds to Beds        MY COPD ACTION PLAN     It is recommended that patients and physicians /healthcare providers complete this action plan together. This plan should be discussed at each physician visit and updated as needed.    The green, yellow and red zones show groups of symptoms of COPD. This list of symptoms is not comprehensive, and you may experience other symptoms. In the \"Actions\" column, your healthcare provider has recommended actions for you to take based on your symptoms.    Patient Name: Oneal Tillman   YOB: 1970   Last Updated on:     Green Zone:  I am doing well today Actions     Usual activitiy and exercise level   Take daily medications     Usual amounts of cough and " "phlegm/mucus   Use oxygen as prescribed     Sleep well at night   Continue regular exercise/diet plan     Appetite is good   At all times avoid cigarette smoke, inhaled irritants     Daily Medications (these medications are taken every day):                Yellow Zone:  I am having a bad day or a COPD flare Actions     More breathless than usual   Continue daily medications     I have less energy for my daily activities   Use quick relief inhaler as ordered     Increased or thicker phlegm/mucus   Use oxygen as prescribed     Using quick relief inhaler/nebulizer more often   Get plenty of rest     Swelling of ankles more than usual   Use pursed lip breathing     More coughing than usual   At all times avoid cigarette smoke, inhaled irritants     I feel like I have a \"chest cold\"     Poor sleep and my symptoms woke me up     My appetite is not good     My medicine is not helping      Call provider immediately if symptoms don’t improve     Continue daily medications, add rescue medications:               Medications to be used during a flare up, (as Discussed with Provider):              Red Zone:  I need urgent medical care Actions     Severe shortness of breath even at rest   Call 911 or seek medical care immediately     Not able to do any activity because of breathing      Fever or shaking chills      Feeling confused or very drowsy       Chest pains      Coughing up blood                  "

## 2024-04-18 NOTE — PROGRESS NOTES
"Mr. Oneal Tillman is a 53 y.o. male who presented to the hospital with left leg pain.  He was found to have a cellulitis and blood cultures grew group a streptococcus    S: He is doing better today and has improved daily.  He is having minimal leg pain and now can bend his knee.  He is not nauseated.    O:  /68   Pulse 91   Temp 36.3 °C (97.3 °F) (Temporal)   Resp 18   Ht 1.803 m (5' 10.98\")   Wt 83.1 kg (183 lb 3.2 oz)   SpO2 94%   BMI 25.56 kg/m²     GEN: awake, alert oriented  CV: RRR, brisk cap refill on hands  RESP: no labored breathing  ABD: soft, nontender, nondistended  Ext: left leg edematous erythema on the lateral aspect of the upper thigh going to the left hip, nontender, no crepitus palpated.  He had some blistering behind his left knee has now turned into some petechial hemorrhage which is nontender and less edematous than 2 days ago.  His leg overall is less swollen and the medial erythema has significant proved.    Recent Labs     04/15/24  1350 04/16/24  0235 04/17/24  0130 04/18/24  0100   WBC 21.8* 27.3* 22.3* 31.5*   RBC 4.02* 3.76* 3.93* 3.82*   HEMOGLOBIN 12.2* 11.5* 11.9* 11.4*   HEMATOCRIT 35.9* 33.8* 35.8* 34.1*   MCV 89.3 89.9 91.1 89.3   MCH 30.3 30.6 30.3 29.8   RDW 43.5 44.3 46.4 44.8   PLATELETCT 217 219 184 265   MPV 10.7 10.8 11.6 10.5   NEUTSPOLYS 71.00  --   --  69.00   LYMPHOCYTES 7.00*  --   --  19.00*   MONOCYTES 2.00  --   --  5.00   EOSINOPHILS 0.00  --   --  1.00   BASOPHILS 0.00  --   --  0.00   RBCMORPHOLO Present  --   --  Normal     Recent Labs     04/16/24  0235 04/17/24  0130 04/18/24  0100   SODIUM 134* 130* 134*   POTASSIUM 3.4* 3.4* 3.6   CHLORIDE 100 96 98   CO2 24 21 26   GLUCOSE 114* 118* 99   BUN 16 15 15     No results found for: \"INR\"    Imaging: CT of the lower extremity shows a small fluid collection tracking up the muscle and between the muscle and the subcutaneous tissue with low Hounsfield units concerning for simple fluid and is likely just " separation and degloving of the muscle and subcutaneous fatty tissue    A/P:   Mr. Oneal Tillman is a 53 y.o. male who presented to the hospital with left leg cellulitis.  Symptomatically he has improved significantly however his white count has increased today from yesterday.  There are no signs of necrotizing soft tissue infection.  Continue antibiotics.  If concern for abscess formation recommend needle sampling of the fluid collection to see if this is pus or just simple fluid.    Mike Samuel MD  Thoracic & General Surgeon  [facility]  [phone]

## 2024-04-18 NOTE — PROGRESS NOTES
Telemetry Shift Summary    Rhythm SR  HR Range 89-97  Ectopy none  Measurements 0.16/0.10/0.36        Normal Values  Rhythm SR  HR Range    Measurements 0.12-0.20 / 0.06-0.10  / 0.30-0.52

## 2024-04-18 NOTE — CARE PLAN
The patient is Watcher - Medium risk of patient condition declining or worsening    Shift Goals  Clinical Goals: IV abx, surgery consult, pain management  Patient Goals: Regular diet, infection treatment.  Family Goals: No family present    Progress made toward(s) clinical / shift goals:    Problem: Pain - Standard  Goal: Alleviation of pain or a reduction in pain to the patient’s comfort goal  Outcome: Progressing     Problem: Urinary - Renal Perfusion  Goal: Ability to achieve and maintain adequate renal perfusion and functioning will improve  Outcome: Progressing  Note: Great UOP after Lasix IV.       Patient is not progressing towards the following goals:      Problem: Hemodynamics  Goal: Patient's hemodynamics, fluid balance and neurologic status will be stable or improve  Outcome: Not Progressing  Note: WBC increased today to 31.5

## 2024-04-18 NOTE — PROGRESS NOTES
Telemetry Shift Summary     Rhythm: SR BBB  HR: 86-96  Ectopy: none    Measurements: 0.18/0.12/0.38    Normal Values  Rhythm: SR  HR:   Measurements: 0.12-0.20/0.08-0.10/0.30-0.52

## 2024-04-18 NOTE — CARE PLAN
The patient is Stable - Low risk of patient condition declining or worsening    Shift Goals  Clinical Goals: IV abx, pain management  Patient Goals: pain management  Family Goals: No family present    Progress made toward(s) clinical / shift goals:        Problem: Knowledge Deficit - Standard  Goal: Patient and family/care givers will demonstrate understanding of plan of care, disease process/condition, diagnostic tests and medications  Outcome: Progressing   Patient verbalized understanding of current plan of care including IV abx, CHG bath, and labs.   Problem: Fall Risk  Goal: Patient will remain free from falls  Outcome: Progressing   Patient demonstrates proper use of call light, bed locked and low, alarm in place.     Patient is not progressing towards the following goals:

## 2024-04-19 ENCOUNTER — APPOINTMENT (OUTPATIENT)
Dept: RADIOLOGY | Facility: MEDICAL CENTER | Age: 54
DRG: 871 | End: 2024-04-19
Attending: INTERNAL MEDICINE
Payer: COMMERCIAL

## 2024-04-19 LAB
ALBUMIN SERPL BCP-MCNC: 2.2 G/DL (ref 3.2–4.9)
ALBUMIN/GLOB SERPL: 0.8 G/DL
ALP SERPL-CCNC: 190 U/L (ref 30–99)
ALT SERPL-CCNC: 33 U/L (ref 2–50)
ANION GAP SERPL CALC-SCNC: 10 MMOL/L (ref 7–16)
AST SERPL-CCNC: 42 U/L (ref 12–45)
BASOPHILS # BLD AUTO: 0 % (ref 0–1.8)
BASOPHILS # BLD: 0 K/UL (ref 0–0.12)
BILIRUB SERPL-MCNC: 0.3 MG/DL (ref 0.1–1.5)
BUN SERPL-MCNC: 15 MG/DL (ref 8–22)
CALCIUM ALBUM COR SERPL-MCNC: 8.9 MG/DL (ref 8.5–10.5)
CALCIUM SERPL-MCNC: 7.5 MG/DL (ref 8.4–10.2)
CHLORIDE SERPL-SCNC: 101 MMOL/L (ref 96–112)
CO2 SERPL-SCNC: 24 MMOL/L (ref 20–33)
CREAT SERPL-MCNC: 0.79 MG/DL (ref 0.5–1.4)
EOSINOPHIL # BLD AUTO: 0 K/UL (ref 0–0.51)
EOSINOPHIL NFR BLD: 0 % (ref 0–6.9)
ERYTHROCYTE [DISTWIDTH] IN BLOOD BY AUTOMATED COUNT: 45.6 FL (ref 35.9–50)
GFR SERPLBLD CREATININE-BSD FMLA CKD-EPI: 105 ML/MIN/1.73 M 2
GLOBULIN SER CALC-MCNC: 2.9 G/DL (ref 1.9–3.5)
GLUCOSE SERPL-MCNC: 108 MG/DL (ref 65–99)
HCT VFR BLD AUTO: 34.9 % (ref 42–52)
HGB BLD-MCNC: 11.7 G/DL (ref 14–18)
LYMPHOCYTES # BLD AUTO: 2.01 K/UL (ref 1–4.8)
LYMPHOCYTES NFR BLD: 6 % (ref 22–41)
MANUAL DIFF BLD: ABNORMAL
MCH RBC QN AUTO: 30.4 PG (ref 27–33)
MCHC RBC AUTO-ENTMCNC: 33.5 G/DL (ref 32.3–36.5)
MCV RBC AUTO: 90.6 FL (ref 81.4–97.8)
METAMYELOCYTES NFR BLD MANUAL: 2 %
MONOCYTES # BLD AUTO: 2.35 K/UL (ref 0–0.85)
MONOCYTES NFR BLD AUTO: 7 % (ref 0–13.4)
MYELOCYTES NFR BLD MANUAL: 9 %
NEUTROPHILS # BLD AUTO: 25.46 K/UL (ref 1.82–7.42)
NEUTROPHILS NFR BLD: 63 % (ref 44–72)
NEUTS BAND NFR BLD MANUAL: 13 % (ref 0–10)
NRBC # BLD AUTO: 0 K/UL
NRBC BLD-RTO: 0 /100 WBC (ref 0–0.2)
PLATELET # BLD AUTO: 297 K/UL (ref 164–446)
PLATELET BLD QL SMEAR: NORMAL
PMV BLD AUTO: 10.2 FL (ref 9–12.9)
POTASSIUM SERPL-SCNC: 4.1 MMOL/L (ref 3.6–5.5)
PROT SERPL-MCNC: 5.1 G/DL (ref 6–8.2)
RBC # BLD AUTO: 3.85 M/UL (ref 4.7–6.1)
RBC BLD AUTO: NORMAL
SODIUM SERPL-SCNC: 135 MMOL/L (ref 135–145)
WBC # BLD AUTO: 33.5 K/UL (ref 4.8–10.8)

## 2024-04-19 PROCEDURE — 85007 BL SMEAR W/DIFF WBC COUNT: CPT

## 2024-04-19 PROCEDURE — 700111 HCHG RX REV CODE 636 W/ 250 OVERRIDE (IP): Mod: JZ | Performed by: INTERNAL MEDICINE

## 2024-04-19 PROCEDURE — 700102 HCHG RX REV CODE 250 W/ 637 OVERRIDE(OP): Performed by: INTERNAL MEDICINE

## 2024-04-19 PROCEDURE — 770020 HCHG ROOM/CARE - TELE (206)

## 2024-04-19 PROCEDURE — 76882 US LMTD JT/FCL EVL NVASC XTR: CPT | Mod: LT

## 2024-04-19 PROCEDURE — 700102 HCHG RX REV CODE 250 W/ 637 OVERRIDE(OP): Performed by: HOSPITALIST

## 2024-04-19 PROCEDURE — 700111 HCHG RX REV CODE 636 W/ 250 OVERRIDE (IP): Performed by: HOSPITALIST

## 2024-04-19 PROCEDURE — A9270 NON-COVERED ITEM OR SERVICE: HCPCS | Performed by: INTERNAL MEDICINE

## 2024-04-19 PROCEDURE — A9270 NON-COVERED ITEM OR SERVICE: HCPCS | Performed by: HOSPITALIST

## 2024-04-19 PROCEDURE — 99233 SBSQ HOSP IP/OBS HIGH 50: CPT | Performed by: INTERNAL MEDICINE

## 2024-04-19 PROCEDURE — 700105 HCHG RX REV CODE 258: Performed by: INTERNAL MEDICINE

## 2024-04-19 PROCEDURE — 85027 COMPLETE CBC AUTOMATED: CPT

## 2024-04-19 PROCEDURE — 80053 COMPREHEN METABOLIC PANEL: CPT

## 2024-04-19 PROCEDURE — 99232 SBSQ HOSP IP/OBS MODERATE 35: CPT | Performed by: INTERNAL MEDICINE

## 2024-04-19 RX ORDER — FUROSEMIDE 10 MG/ML
40 INJECTION INTRAMUSCULAR; INTRAVENOUS ONCE
Status: COMPLETED | OUTPATIENT
Start: 2024-04-19 | End: 2024-04-19

## 2024-04-19 RX ADMIN — HEPARIN SODIUM 5000 UNITS: 5000 INJECTION, SOLUTION INTRAVENOUS; SUBCUTANEOUS at 04:33

## 2024-04-19 RX ADMIN — FUROSEMIDE 40 MG: 10 INJECTION INTRAMUSCULAR; INTRAVENOUS at 15:04

## 2024-04-19 RX ADMIN — HEPARIN SODIUM 5000 UNITS: 5000 INJECTION, SOLUTION INTRAVENOUS; SUBCUTANEOUS at 15:04

## 2024-04-19 RX ADMIN — OXYCODONE HYDROCHLORIDE 10 MG: 10 TABLET ORAL at 20:45

## 2024-04-19 RX ADMIN — NICOTINE 14 MG: 14 PATCH, EXTENDED RELEASE TRANSDERMAL at 04:33

## 2024-04-19 RX ADMIN — OXYCODONE HYDROCHLORIDE 5 MG: 5 TABLET ORAL at 04:38

## 2024-04-19 RX ADMIN — AMPICILLIN AND SULBACTAM 3 G: 1; 2 INJECTION, POWDER, FOR SOLUTION INTRAMUSCULAR; INTRAVENOUS at 17:39

## 2024-04-19 RX ADMIN — LINEZOLID 600 MG: 600 TABLET, FILM COATED ORAL at 04:32

## 2024-04-19 RX ADMIN — OXYCODONE HYDROCHLORIDE 10 MG: 10 TABLET ORAL at 11:29

## 2024-04-19 RX ADMIN — OXYCODONE HYDROCHLORIDE 10 MG: 10 TABLET ORAL at 16:44

## 2024-04-19 RX ADMIN — HEPARIN SODIUM 5000 UNITS: 5000 INJECTION, SOLUTION INTRAVENOUS; SUBCUTANEOUS at 21:39

## 2024-04-19 RX ADMIN — AMPICILLIN AND SULBACTAM 3 G: 1; 2 INJECTION, POWDER, FOR SOLUTION INTRAMUSCULAR; INTRAVENOUS at 23:55

## 2024-04-19 ASSESSMENT — ENCOUNTER SYMPTOMS
NERVOUS/ANXIOUS: 0
SHORTNESS OF BREATH: 0
CHILLS: 0
COUGH: 0
MYALGIAS: 1
DIZZINESS: 0
FEVER: 0
CONSTIPATION: 0
BLURRED VISION: 0
DEPRESSION: 0
CLAUDICATION: 0
HEADACHES: 0
VOMITING: 0
INSOMNIA: 0
HEARTBURN: 0
MYALGIAS: 0
SENSORY CHANGE: 0
ABDOMINAL PAIN: 0
DIARRHEA: 0
PHOTOPHOBIA: 0
WEAKNESS: 0
SPEECH CHANGE: 0

## 2024-04-19 ASSESSMENT — PAIN DESCRIPTION - PAIN TYPE
TYPE: ACUTE PAIN

## 2024-04-19 ASSESSMENT — FIBROSIS 4 INDEX: FIB4 SCORE: 1.33

## 2024-04-19 NOTE — PROGRESS NOTES
Telemetry summary    Rhythm: SR  Rate: 83-96  Ectopy: rPAC  Measurements: 0.16/0.08/0.36    Normal Values  Rhythm: SR  HR Range:   Measurement: 0.12-0.2/0.06-0.10/0.30-0.52

## 2024-04-19 NOTE — CARE PLAN
The patient is Stable - Low risk of patient condition declining or worsening    Shift Goals  Clinical Goals: IV ABX, pain management  Patient Goals: rest and comfort  Family Goals: AHSAN    Progress made toward(s) clinical / shift goals:    Problem: Pain - Standard  Goal: Alleviation of pain or a reduction in pain to the patient’s comfort goal  Outcome: Progressing  Note: Pt reported left leg pain, medicated per MAR. Pt educated on use of call light for any pain management needs.      Problem: Knowledge Deficit - Standard  Goal: Patient and family/care givers will demonstrate understanding of plan of care, disease process/condition, diagnostic tests and medications  Outcome: Progressing  Note: Pt updated on POC, agreeable. Receiving PCN. All questions and concerns addressed at this time.      Problem: Fluid Volume  Goal: Fluid volume balance will be maintained  Outcome: Progressing     Problem: Fall Risk  Goal: Patient will remain free from falls  Outcome: Progressing  Note: Pt is a high fall risk. Bed and strip alarm in place. Call light within reach.

## 2024-04-19 NOTE — PROGRESS NOTES
Hospital Medicine Daily Progress Note    Date of Service  4/19/2024    Chief Complaint  Oneal Tillman is a 53 y.o. male admitted 4/13/2024 with left leg pain and erythema    Hospital Course  Patient is a 53-year-old male with diabetes and tobacco dependence who presented with leg swelling of the left leg for 1 week and body aches.  Questionable spider bite on the left leg after he changed his pants and then 2 days later started having worsening pain redness and swelling.  Also noticed chills palpitations weakness.  Tachycardic with a heart rate of 104 and low blood pressure.  His initial lactic acid was 4.3 and white count was 15.  Patient was initially admitted and given fluid resuscitation and started on IV antibiotics.  Blood cultures came back positive for group B strep and infectious disease recommended in addition to continuation of Zyvox to add pen G.  He is having worsening left leg erythema and ascending rash with blistering lesions.  Repeat CT left leg pending.    Interval Problem Update  4/16 patient states he is having significant pain in the left leg, thinks he feels his thigh is softer but he still having excruciating pain.  Denies any fevers overnight but is diaphoretic at times.  Patient now with bulla in the left groin and significant scrotal edema.  IV fluids discontinued as his acute kidney injury has resolved, single dose IV Lasix ordered, repeat CT left lower extremity per infectious disease recommendations to make sure there is not significant progression or gas producing lesion.  4/17 patient seen this morning and then later discussed again with infectious disease this afternoon.  On my evaluation his left leg was less edematous and less erythematous.  Throughout the afternoon however ID has noticed that the erythema and slight edema has now affected his left upper thigh all the way to the left lower abdomen.  WBC count is slightly better.  Patient has been afebrile.  The CT leg showed a very  small fluid collection which likely is not amenable at this time for surgical intervention or aspiration of the area.  Again low threshold to reconsult surgery if there is any worsening of the patient's status however we will continue with antibiotics and will give another dose of diuresis.  4/18 patient doing about the same from a physical standpoint erythema remains up to the left flank, patient overall is feeling better but given his elevated WBC, ID recommended general surgery reconsultation.  General surgery does not feel that intervention from their standpoint is appropriate at this time.  If there is an attainable fluid collection aspiration recommended.  WBC count up to 31.5 today.  Patient is very frustrated with his current plan of care given the possibility of upcoming surgery and the fact that he was labeled to be diabetic because of elevated blood glucose.  As surgery is not happening today regular diet has been reinitiated.  4/19 patient states overall he is feeling better.  The erythema on his left flank is the same if not slightly better and less superior.  The blister in the left groin has ruptured, there is firmness to the left medial thigh and ultrasound will be done over this area to see if there is any evidence of abscess that could be aspirated.    I have discussed this patient's plan of care and discharge plan at IDT rounds today with Case Management, Nursing, Nursing leadership, and other members of the IDT team.    Consultants/Specialty  infectious disease  General surgery    Code Status  Full Code    Disposition  The patient is not medically cleared for discharge to home or a post-acute facility.  Anticipate discharge to: home with close outpatient follow-up    I have placed the appropriate orders for post-discharge needs.    Review of Systems  Review of Systems   Constitutional:  Negative for chills and fever.   HENT:  Negative for congestion.    Eyes:  Negative for blurred vision and  photophobia.   Respiratory:  Negative for cough and shortness of breath.    Cardiovascular:  Positive for leg swelling (Left leg and scrotal swelling). Negative for chest pain and claudication.   Gastrointestinal:  Negative for abdominal pain, constipation, diarrhea, heartburn and vomiting.   Genitourinary:  Negative for dysuria and hematuria.   Musculoskeletal:  Negative for joint pain and myalgias.   Skin:  Positive for rash. Negative for itching.   Neurological:  Negative for dizziness, sensory change, speech change, weakness and headaches.   Psychiatric/Behavioral:  Negative for depression. The patient is not nervous/anxious and does not have insomnia.         Physical Exam  Temp:  [36.6 °C (97.8 °F)-37.6 °C (99.6 °F)] 37.6 °C (99.6 °F)  Pulse:  [84-97] 86  Resp:  [16-18] 18  BP: (104-116)/(54-71) 114/64  SpO2:  [91 %-96 %] 95 %    Physical Exam  Vitals and nursing note reviewed.   Constitutional:       General: He is not in acute distress.     Appearance: Normal appearance.   HENT:      Head: Normocephalic and atraumatic.   Eyes:      General: No scleral icterus.     Extraocular Movements: Extraocular movements intact.   Cardiovascular:      Rate and Rhythm: Normal rate and regular rhythm.      Pulses: Normal pulses.      Heart sounds: Normal heart sounds. No murmur heard.  Pulmonary:      Effort: Pulmonary effort is normal. No respiratory distress.      Breath sounds: Normal breath sounds. No wheezing, rhonchi or rales.   Abdominal:      General: Abdomen is flat. Bowel sounds are normal. There is no distension.      Palpations: Abdomen is soft.      Tenderness: There is no rebound.      Comments: Erythema now present left lower abdomen/flank   Genitourinary:     Comments: Scrotal edema improved.    Musculoskeletal:         General: No swelling or tenderness.      Cervical back: Normal range of motion and neck supple.      Left lower leg: Edema (slightly improved) present.   Lymphadenopathy:      Cervical: No  cervical adenopathy.   Skin:     General: Skin is warm.      Coloration: Skin is not jaundiced.      Findings: Bruising (bruising left popliteal area improving, less edmatious.) present. No erythema.      Comments: Blisters resolved, have ruptured   Neurological:      General: No focal deficit present.      Mental Status: He is alert and oriented to person, place, and time. Mental status is at baseline.      Cranial Nerves: No cranial nerve deficit.   Psychiatric:         Mood and Affect: Mood normal.         Behavior: Behavior normal.         Fluids    Intake/Output Summary (Last 24 hours) at 4/19/2024 1522  Last data filed at 4/19/2024 0900  Gross per 24 hour   Intake 600 ml   Output 1150 ml   Net -550 ml       Laboratory  Recent Labs     04/17/24  0130 04/18/24  0100 04/19/24  0119   WBC 22.3* 31.5* 33.5*   RBC 3.93* 3.82* 3.85*   HEMOGLOBIN 11.9* 11.4* 11.7*   HEMATOCRIT 35.8* 34.1* 34.9*   MCV 91.1 89.3 90.6   MCH 30.3 29.8 30.4   MCHC 33.2 33.4 33.5   RDW 46.4 44.8 45.6   PLATELETCT 184 265 297   MPV 11.6 10.5 10.2     Recent Labs     04/17/24 0130 04/18/24  0100 04/19/24  0119   SODIUM 130* 134* 135   POTASSIUM 3.4* 3.6 4.1   CHLORIDE 96 98 101   CO2 21 26 24   GLUCOSE 118* 99 108*   BUN 15 15 15   CREATININE 0.81 0.80 0.79   CALCIUM 7.4* 7.3* 7.5*                   Imaging  CT-EXTREMITY, LOWER WITH LEFT   Final Result      1.  Subcutaneous inflammatory changes about the left lower leg and thigh suspicious for cellulitis.      2.  Curvilinear fluid attenuation adjacent to the musculature of the lateral aspect of the left thigh measuring 1.3 cm in greatest diameter suspicious for subcutaneous seroma less likely early abscess formation.      EC-ECHOCARDIOGRAM COMPLETE W/O CONT   Final Result      US-EXTREMITY VENOUS LOWER UNILAT LEFT   Final Result         No evidence of acute deep venous thrombosis in the visualized venous segments of the left lower extremity.         DX-CHEST-FOR LINE PLACEMENT Perform  procedure in: Patient's Room   Final Result      1.  Interval insertion of a central venous catheter which terminates with the tip projecting over the expected region of the mid to distal superior vena cava.   2.  No acute cardiopulmonary.      CT-EXTREMITY, LOWER W/O LEFT   Final Result      1.  Left lower extremity edema versus cellulitis. No drainable fluid collections on this noncontrast study.   2.  Enlarged left external iliac and inguinal lymph nodes. They may be reactive to the left lower extremity infection, however, neoplastic nodes are difficult to rule out. Close clinical follow-up is needed.      DX-CHEST-PORTABLE (1 VIEW)   Final Result      No acute cardiopulmonary abnormality.         US-EXTREMITY NON VASCULAR UNILATERAL LEFT    (Results Pending)        Assessment/Plan  * Septic shock (HCC)- (present on admission)  Assessment & Plan  This is Sepsis Present on admission  SIRS criteria identified on my evaluation include: Tachycardia, with heart rate greater than 90 BPM, Leukocytosis, with WBC greater than 12,000, and Bandemia, greater than 10% bands  Clinical indicators of end organ dysfunction include Hypotension with systolic blood pressure less than 90 or MAP less than 65, Hypotension with decrease in systolic blood pressure of more than 40mmHg, and Lactic Acid greater than 2  Source is cellulitis  Sepsis protocol initiated  Crystalloid Fluid Administration: Fluid resuscitation ordered per standard protocol - 30 mL/kg per current or ideal body weight  IV antibiotics as appropriate for source of sepsis  Reassessment: I have reassessed the patient's hemodynamic status     WBC 33.5  - Discussed again with infectious disease, GERTRUDE pen G, start Unasyn, DC linezolid  --  discussed with Dr. Samuel, no need for surgical involvement at this time if there is concern of an abscess then recommends needle aspiration.  --Ultrasound of the left groin, upper thigh does not show obvious abscess that could be  aspirated for culture, possible need of MRI    Hypokalemia- (present on admission)  Assessment & Plan  K 3.4, replacing  PO    Hypophosphatemia- (present on admission)  Assessment & Plan  Phos 1.6 replacing with IV K-Phos    Bacteremia due to Streptococcus- (present on admission)  Assessment & Plan  4/13/24 blood culture positive for strep group A  4/15  -cultures negative x 2  Unless there is another abscess to aspirate, treating group A strep, continue with Unasyn  - I consulted ID  - I ordered echocardiogram -which did not show any signs of vegetations  -Repeat CT left lower extremity shows small questionable fluid collection but more consistent with cellulitis.  Erythema left side abdomen now but leg and groin look much better.  Appreciate general surgery reconsultation    Metabolic acidosis- (present on admission)  Assessment & Plan  Likely due to reduced intravascular volume and secondary to sepsis.  Continue IV fluids  Bicarb 23    Hyponatremia- (present on admission)  Assessment & Plan  Hypovolemic hyponatremia   Continue IV fluids  Sodium 136    Dehydration- (present on admission)  Assessment & Plan  Resolved, IV diuresis prn      Acute kidney injury (HCC)- (present on admission)  Assessment & Plan  Resolved, now with volume overload, continue as needed Lasix    Tobacco dependence- (present on admission)  Assessment & Plan  Counseled on admission    Hyperglycemia- (present on admission)  Assessment & Plan  With marked hyperglycemia  Continue SSI, Accu-Cheks and hypoglycemia protocol    Cellulitis of left lower extremity- (present on admission)  Assessment & Plan  - I discussed with ID pharmacy, I recommended converting antibiotics to continue linezolid, pen G 24,000,000 units  Discussed with Dr. Kendall, appreciate eval, appreciate surgical evaluation as well  Significant elevation in WBC count today, afebrile, patient states overall he is feeling better         VTE prophylaxis:    heparin ppx      I have  performed a physical exam and reviewed and updated ROS and Plan today (4/19/2024). In review of yesterday's note (4/18/2024), there are no changes except as documented above.

## 2024-04-20 ENCOUNTER — APPOINTMENT (OUTPATIENT)
Dept: RADIOLOGY | Facility: MEDICAL CENTER | Age: 54
DRG: 871 | End: 2024-04-20
Attending: STUDENT IN AN ORGANIZED HEALTH CARE EDUCATION/TRAINING PROGRAM
Payer: COMMERCIAL

## 2024-04-20 LAB
ALBUMIN SERPL BCP-MCNC: 2.3 G/DL (ref 3.2–4.9)
ALBUMIN/GLOB SERPL: 0.7 G/DL
ALP SERPL-CCNC: 229 U/L (ref 30–99)
ALT SERPL-CCNC: 31 U/L (ref 2–50)
ANION GAP SERPL CALC-SCNC: 10 MMOL/L (ref 7–16)
AST SERPL-CCNC: 35 U/L (ref 12–45)
BACTERIA BLD CULT: NORMAL
BACTERIA BLD CULT: NORMAL
BASOPHILS # BLD AUTO: 0 % (ref 0–1.8)
BASOPHILS # BLD: 0 K/UL (ref 0–0.12)
BILIRUB SERPL-MCNC: 0.2 MG/DL (ref 0.1–1.5)
BUN SERPL-MCNC: 14 MG/DL (ref 8–22)
CALCIUM ALBUM COR SERPL-MCNC: 9.1 MG/DL (ref 8.5–10.5)
CALCIUM SERPL-MCNC: 7.7 MG/DL (ref 8.4–10.2)
CHLORIDE SERPL-SCNC: 100 MMOL/L (ref 96–112)
CO2 SERPL-SCNC: 24 MMOL/L (ref 20–33)
CREAT SERPL-MCNC: 0.77 MG/DL (ref 0.5–1.4)
DOHLE BOD BLD QL SMEAR: NORMAL
EOSINOPHIL # BLD AUTO: 0.38 K/UL (ref 0–0.51)
EOSINOPHIL NFR BLD: 1 % (ref 0–6.9)
ERYTHROCYTE [DISTWIDTH] IN BLOOD BY AUTOMATED COUNT: 46.8 FL (ref 35.9–50)
GFR SERPLBLD CREATININE-BSD FMLA CKD-EPI: 106 ML/MIN/1.73 M 2
GLOBULIN SER CALC-MCNC: 3.2 G/DL (ref 1.9–3.5)
GLUCOSE SERPL-MCNC: 133 MG/DL (ref 65–99)
HCT VFR BLD AUTO: 35.5 % (ref 42–52)
HGB BLD-MCNC: 11.8 G/DL (ref 14–18)
LYMPHOCYTES # BLD AUTO: 2.29 K/UL (ref 1–4.8)
LYMPHOCYTES NFR BLD: 6 % (ref 22–41)
MANUAL DIFF BLD: ABNORMAL
MCH RBC QN AUTO: 30.2 PG (ref 27–33)
MCHC RBC AUTO-ENTMCNC: 33.2 G/DL (ref 32.3–36.5)
MCV RBC AUTO: 90.8 FL (ref 81.4–97.8)
METAMYELOCYTES NFR BLD MANUAL: 7 %
MONOCYTES # BLD AUTO: 0.76 K/UL (ref 0–0.85)
MONOCYTES NFR BLD AUTO: 2 % (ref 0–13.4)
MYELOCYTES NFR BLD MANUAL: 7 %
NEUTROPHILS # BLD AUTO: 29.03 K/UL (ref 1.82–7.42)
NEUTROPHILS NFR BLD: 59 % (ref 44–72)
NEUTS BAND NFR BLD MANUAL: 17 % (ref 0–10)
NRBC # BLD AUTO: 0 K/UL
NRBC BLD-RTO: 0 /100 WBC (ref 0–0.2)
PLATELET # BLD AUTO: 329 K/UL (ref 164–446)
PLATELET BLD QL SMEAR: NORMAL
PMV BLD AUTO: 9.8 FL (ref 9–12.9)
POTASSIUM SERPL-SCNC: 4.1 MMOL/L (ref 3.6–5.5)
PROMYELOCYTES NFR BLD MANUAL: 1 %
PROT SERPL-MCNC: 5.5 G/DL (ref 6–8.2)
RBC # BLD AUTO: 3.91 M/UL (ref 4.7–6.1)
RBC BLD AUTO: NORMAL
SIGNIFICANT IND 70042: NORMAL
SIGNIFICANT IND 70042: NORMAL
SITE SITE: NORMAL
SITE SITE: NORMAL
SODIUM SERPL-SCNC: 134 MMOL/L (ref 135–145)
SOURCE SOURCE: NORMAL
SOURCE SOURCE: NORMAL
TOXIC GRANULES BLD QL SMEAR: NORMAL
WBC # BLD AUTO: 38.2 K/UL (ref 4.8–10.8)

## 2024-04-20 PROCEDURE — 99232 SBSQ HOSP IP/OBS MODERATE 35: CPT | Performed by: INTERNAL MEDICINE

## 2024-04-20 PROCEDURE — 770020 HCHG ROOM/CARE - TELE (206)

## 2024-04-20 PROCEDURE — 94760 N-INVAS EAR/PLS OXIMETRY 1: CPT

## 2024-04-20 PROCEDURE — A9270 NON-COVERED ITEM OR SERVICE: HCPCS | Performed by: HOSPITALIST

## 2024-04-20 PROCEDURE — 99233 SBSQ HOSP IP/OBS HIGH 50: CPT | Performed by: INTERNAL MEDICINE

## 2024-04-20 PROCEDURE — 700105 HCHG RX REV CODE 258: Performed by: INTERNAL MEDICINE

## 2024-04-20 PROCEDURE — 700102 HCHG RX REV CODE 250 W/ 637 OVERRIDE(OP): Performed by: HOSPITALIST

## 2024-04-20 PROCEDURE — 76705 ECHO EXAM OF ABDOMEN: CPT

## 2024-04-20 PROCEDURE — 700111 HCHG RX REV CODE 636 W/ 250 OVERRIDE (IP): Mod: JZ | Performed by: INTERNAL MEDICINE

## 2024-04-20 PROCEDURE — 85027 COMPLETE CBC AUTOMATED: CPT

## 2024-04-20 PROCEDURE — 700111 HCHG RX REV CODE 636 W/ 250 OVERRIDE (IP): Performed by: HOSPITALIST

## 2024-04-20 PROCEDURE — 80053 COMPREHEN METABOLIC PANEL: CPT

## 2024-04-20 PROCEDURE — 85007 BL SMEAR W/DIFF WBC COUNT: CPT

## 2024-04-20 RX ADMIN — AMPICILLIN AND SULBACTAM 3 G: 1; 2 INJECTION, POWDER, FOR SOLUTION INTRAMUSCULAR; INTRAVENOUS at 05:17

## 2024-04-20 RX ADMIN — OXYCODONE HYDROCHLORIDE 10 MG: 10 TABLET ORAL at 19:21

## 2024-04-20 RX ADMIN — OXYCODONE HYDROCHLORIDE 10 MG: 10 TABLET ORAL at 15:22

## 2024-04-20 RX ADMIN — OXYCODONE HYDROCHLORIDE 10 MG: 10 TABLET ORAL at 00:11

## 2024-04-20 RX ADMIN — AMPICILLIN AND SULBACTAM 3 G: 1; 2 INJECTION, POWDER, FOR SOLUTION INTRAMUSCULAR; INTRAVENOUS at 11:05

## 2024-04-20 RX ADMIN — OXYCODONE HYDROCHLORIDE 10 MG: 10 TABLET ORAL at 22:58

## 2024-04-20 RX ADMIN — HEPARIN SODIUM 5000 UNITS: 5000 INJECTION, SOLUTION INTRAVENOUS; SUBCUTANEOUS at 22:42

## 2024-04-20 RX ADMIN — AMPICILLIN AND SULBACTAM 3 G: 1; 2 INJECTION, POWDER, FOR SOLUTION INTRAMUSCULAR; INTRAVENOUS at 17:32

## 2024-04-20 RX ADMIN — OXYCODONE HYDROCHLORIDE 10 MG: 10 TABLET ORAL at 11:03

## 2024-04-20 RX ADMIN — HEPARIN SODIUM 5000 UNITS: 5000 INJECTION, SOLUTION INTRAVENOUS; SUBCUTANEOUS at 05:18

## 2024-04-20 RX ADMIN — OXYCODONE HYDROCHLORIDE 10 MG: 10 TABLET ORAL at 05:13

## 2024-04-20 RX ADMIN — NICOTINE 14 MG: 14 PATCH, EXTENDED RELEASE TRANSDERMAL at 05:14

## 2024-04-20 ASSESSMENT — ENCOUNTER SYMPTOMS
BLURRED VISION: 0
MYALGIAS: 1
SHORTNESS OF BREATH: 0
WEAKNESS: 0
VOMITING: 0
INSOMNIA: 0
SENSORY CHANGE: 0
NERVOUS/ANXIOUS: 0
HEARTBURN: 0
FEVER: 0
DIARRHEA: 0
CHILLS: 0
PHOTOPHOBIA: 0
CLAUDICATION: 0
SPEECH CHANGE: 0
HEADACHES: 0
DIZZINESS: 0
MYALGIAS: 0
CONSTIPATION: 0
DEPRESSION: 0
ABDOMINAL PAIN: 0
COUGH: 0

## 2024-04-20 ASSESSMENT — PAIN DESCRIPTION - PAIN TYPE
TYPE: ACUTE PAIN

## 2024-04-20 ASSESSMENT — FIBROSIS 4 INDEX: FIB4 SCORE: 1.03

## 2024-04-20 NOTE — PROGRESS NOTES
Telemetry summary    Rhythm: SR  Rate: 83-93  Ectopy: rPVC  Measurements: 0.18/0.08/0.40    Normal Values  Rhythm: SR  HR Range:   Measurement: 0.12-0.2/0.06-0.10/0.30-0.52

## 2024-04-20 NOTE — PROGRESS NOTES
Telemetry Shift Summary    Rhythm SR  HR Range 82-90  Ectopy none  Measurements 0.18/0.08/0.36      Normal Values  Rhythm SR  HR Range:   Measurements: 0.12-0.20/0.06-0.10/0.30-0.52

## 2024-04-20 NOTE — PROGRESS NOTES
"Mr. Oneal Tillman is a 53 y.o. male who presented to the hospital with left leg pain.  He was found to have a cellulitis and blood cultures grew group a streptococcus    S: Pain in his thigh improved today.   No nausea vomiting.    O:  /58   Pulse 89   Temp 36.2 °C (97.2 °F) (Temporal)   Resp 18   Ht 1.803 m (5' 10.98\")   Wt 77.9 kg (171 lb 11.8 oz)   SpO2 95%   BMI 23.96 kg/m²     GEN: awake, alert oriented  CV: RRR, brisk cap refill on hands  RESP: no labored breathing  ABD: soft, nontender, nondistended  Ext: left leg edematous; erythema on the medial aspect and lateral aspect of the upper thigh going to the left hip and flank, minimal tenderness to palpation, no crepitus palpated.  He had some blistering behind his left knee has now turned into some petechial hemorrhage which is nontender and less edematous than on presentation.  His leg overall is less swollen and the medial erythema and now lateral erythema has significant proved.      Recent Labs     04/18/24 0100 04/19/24 0119 04/20/24  0043   WBC 31.5* 33.5* 38.2*   RBC 3.82* 3.85* 3.91*   HEMOGLOBIN 11.4* 11.7* 11.8*   HEMATOCRIT 34.1* 34.9* 35.5*   MCV 89.3 90.6 90.8   MCH 29.8 30.4 30.2   RDW 44.8 45.6 46.8   PLATELETCT 265 297 329   MPV 10.5 10.2 9.8   NEUTSPOLYS 69.00 63.00 59.00   LYMPHOCYTES 19.00* 6.00* 6.00*   MONOCYTES 5.00 7.00 2.00   EOSINOPHILS 1.00 0.00 1.00   BASOPHILS 0.00 0.00 0.00   RBCMORPHOLO Normal Normal Normal     Recent Labs     04/18/24 0100 04/19/24 0119 04/20/24  0043   SODIUM 134* 135 134*   POTASSIUM 3.6 4.1 4.1   CHLORIDE 98 101 100   CO2 26 24 24   GLUCOSE 99 108* 133*   BUN 15 15 14     No results found for: \"INR\"    Imaging: CT of the lower extremity shows a small fluid collection tracking up the muscle and between the muscle and the subcutaneous tissue with low Hounsfield units concerning for simple fluid and is likely just separation and degloving of the muscle and subcutaneous fatty tissue    A/P:   Mr. " Oneal Tillman is a 53 y.o. male who presented to the hospital with left leg cellulitis.  Symptomatically he has improved significantly however his white count has increased over the past three days.  There are no signs of necrotizing soft tissue infection. I placed an ultrasound to his leg this morning looking at his flank and thigh and saw no fluid collection. Continue antibiotics.  Formal ultrasounds found no fluid collections. MRI pending.      Mike Samuel MD  Thoracic & General Surgeon  [facility]  [phone]

## 2024-04-20 NOTE — CARE PLAN
The patient is Watcher - Medium risk of patient condition declining or worsening    Shift Goals  Clinical Goals: pain management, IV ABX, dressing change  Patient Goals: comfort  Family Goals: AHSAN    Progress made toward(s) clinical / shift goals:  abx changed to unasyn. IJ dressing changed. Pain managed with prns.       Problem: Pain - Standard  Goal: Alleviation of pain or a reduction in pain to the patient’s comfort goal  Outcome: Progressing     Problem: Knowledge Deficit - Standard  Goal: Patient and family/care givers will demonstrate understanding of plan of care, disease process/condition, diagnostic tests and medications  Outcome: Progressing     Problem: Fall Risk  Goal: Patient will remain free from falls  Outcome: Progressing       Patient is not progressing towards the following goals:

## 2024-04-20 NOTE — PROGRESS NOTES
Hospital Medicine Daily Progress Note    Date of Service  4/20/2024    Chief Complaint  Oneal Tillman is a 53 y.o. male admitted 4/13/2024 with left leg pain and erythema    Hospital Course  Patient is a 53-year-old male with diabetes and tobacco dependence who presented with leg swelling of the left leg for 1 week and body aches.  Questionable spider bite on the left leg after he changed his pants and then 2 days later started having worsening pain redness and swelling.  Also noticed chills palpitations weakness.  Tachycardic with a heart rate of 104 and low blood pressure.  His initial lactic acid was 4.3 and white count was 15.  Patient was initially admitted and given fluid resuscitation and started on IV antibiotics.  Blood cultures came back positive for group B strep and infectious disease recommended in addition to continuation of Zyvox to add pen G.  He is having worsening left leg erythema and ascending rash with blistering lesions.  Repeat CT left leg pending.    Interval Problem Update  4/16 patient states he is having significant pain in the left leg, thinks he feels his thigh is softer but he still having excruciating pain.  Denies any fevers overnight but is diaphoretic at times.  Patient now with bulla in the left groin and significant scrotal edema.  IV fluids discontinued as his acute kidney injury has resolved, single dose IV Lasix ordered, repeat CT left lower extremity per infectious disease recommendations to make sure there is not significant progression or gas producing lesion.  4/17 patient seen this morning and then later discussed again with infectious disease this afternoon.  On my evaluation his left leg was less edematous and less erythematous.  Throughout the afternoon however ID has noticed that the erythema and slight edema has now affected his left upper thigh all the way to the left lower abdomen.  WBC count is slightly better.  Patient has been afebrile.  The CT leg showed a very  small fluid collection which likely is not amenable at this time for surgical intervention or aspiration of the area.  Again low threshold to reconsult surgery if there is any worsening of the patient's status however we will continue with antibiotics and will give another dose of diuresis.  4/18 patient doing about the same from a physical standpoint erythema remains up to the left flank, patient overall is feeling better but given his elevated WBC, ID recommended general surgery reconsultation.  General surgery does not feel that intervention from their standpoint is appropriate at this time.  If there is an attainable fluid collection aspiration recommended.  WBC count up to 31.5 today.  Patient is very frustrated with his current plan of care given the possibility of upcoming surgery and the fact that he was labeled to be diabetic because of elevated blood glucose.  As surgery is not happening today regular diet has been reinitiated.  4/19 patient states overall he is feeling better.  The erythema on his left flank is the same if not slightly better and less superior.  The blister in the left groin has ruptured, there is firmness to the left medial thigh and ultrasound will be done over this area to see if there is any evidence of abscess that could be aspirated.  4/20 patient with visual improvement to his flank erythema and edema.  It has regressed past 3 days worth of demarcation.  The majority of his erythema is the left lateral thigh.  Multiple MRIs pending to help image the area to make sure that infection is improving.  WBC count continues to elevate but patient clinically looks better today.    I have discussed this patient's plan of care and discharge plan at IDT rounds today with Case Management, Nursing, Nursing leadership, and other members of the IDT team.    Consultants/Specialty  infectious disease  General surgery    Code Status  Full Code    Disposition  The patient is not medically cleared for  discharge to home or a post-acute facility.  Anticipate discharge to: home with close outpatient follow-up    I have placed the appropriate orders for post-discharge needs.    Review of Systems  Review of Systems   Constitutional:  Negative for chills and fever.   HENT:  Negative for congestion.    Eyes:  Negative for blurred vision and photophobia.   Respiratory:  Negative for cough and shortness of breath.    Cardiovascular:  Positive for leg swelling (Left leg and scrotal swelling - improving). Negative for chest pain and claudication.   Gastrointestinal:  Negative for abdominal pain, constipation, diarrhea, heartburn and vomiting.   Genitourinary:  Negative for dysuria and hematuria.   Musculoskeletal:  Negative for joint pain and myalgias.   Skin:  Positive for rash. Negative for itching.   Neurological:  Negative for dizziness, sensory change, speech change, weakness and headaches.   Psychiatric/Behavioral:  Negative for depression. The patient is not nervous/anxious and does not have insomnia.         Physical Exam  Temp:  [36.2 °C (97.2 °F)-37.1 °C (98.7 °F)] 36.6 °C (97.8 °F)  Pulse:  [80-99] 80  Resp:  [16-18] 18  BP: (108-113)/(55-65) 110/57  SpO2:  [93 %-97 %] 97 %    Physical Exam  Vitals and nursing note reviewed.   Constitutional:       General: He is not in acute distress.     Appearance: Normal appearance.   HENT:      Head: Normocephalic and atraumatic.   Eyes:      General: No scleral icterus.     Extraocular Movements: Extraocular movements intact.   Cardiovascular:      Rate and Rhythm: Normal rate and regular rhythm.      Pulses: Normal pulses.      Heart sounds: Normal heart sounds. No murmur heard.  Pulmonary:      Effort: Pulmonary effort is normal. No respiratory distress.      Breath sounds: Normal breath sounds. No wheezing, rhonchi or rales.   Abdominal:      General: Abdomen is flat. Bowel sounds are normal. There is no distension.      Palpations: Abdomen is soft.      Tenderness:  There is no rebound.      Comments: Erythema now present left lower abdomen/flank - regressing     Genitourinary:     Comments: Scrotal edema improved.    Musculoskeletal:         General: No swelling or tenderness.      Cervical back: Normal range of motion and neck supple.      Left lower leg: Edema (slightly improved) present.   Lymphadenopathy:      Cervical: No cervical adenopathy.   Skin:     General: Skin is warm.      Coloration: Skin is not jaundiced.      Findings: Bruising (bruising left popliteal area improving, less edmatious.) present. No erythema.      Comments: Blisters resolved, have ruptured   Neurological:      General: No focal deficit present.      Mental Status: He is alert and oriented to person, place, and time. Mental status is at baseline.      Cranial Nerves: No cranial nerve deficit.   Psychiatric:         Mood and Affect: Mood normal.         Behavior: Behavior normal.         Fluids    Intake/Output Summary (Last 24 hours) at 4/20/2024 1614  Last data filed at 4/20/2024 1110  Gross per 24 hour   Intake --   Output 3000 ml   Net -3000 ml       Laboratory  Recent Labs     04/18/24  0100 04/19/24  0119 04/20/24  0043   WBC 31.5* 33.5* 38.2*   RBC 3.82* 3.85* 3.91*   HEMOGLOBIN 11.4* 11.7* 11.8*   HEMATOCRIT 34.1* 34.9* 35.5*   MCV 89.3 90.6 90.8   MCH 29.8 30.4 30.2   MCHC 33.4 33.5 33.2   RDW 44.8 45.6 46.8   PLATELETCT 265 297 329   MPV 10.5 10.2 9.8     Recent Labs     04/18/24  0100 04/19/24  0119 04/20/24  0043   SODIUM 134* 135 134*   POTASSIUM 3.6 4.1 4.1   CHLORIDE 98 101 100   CO2 26 24 24   GLUCOSE 99 108* 133*   BUN 15 15 14   CREATININE 0.80 0.79 0.77   CALCIUM 7.3* 7.5* 7.7*                   Imaging  US-ABDOMEN LTD (SOFT TISSUE)   Final Result         1. No discrete fluid collection identified.      US-EXTREMITY NON VASCULAR UNILATERAL LEFT   Final Result      Edema without a discrete fluid collection.      CT-EXTREMITY, LOWER WITH LEFT   Final Result      1.  Subcutaneous  inflammatory changes about the left lower leg and thigh suspicious for cellulitis.      2.  Curvilinear fluid attenuation adjacent to the musculature of the lateral aspect of the left thigh measuring 1.3 cm in greatest diameter suspicious for subcutaneous seroma less likely early abscess formation.      EC-ECHOCARDIOGRAM COMPLETE W/O CONT   Final Result      US-EXTREMITY VENOUS LOWER UNILAT LEFT   Final Result         No evidence of acute deep venous thrombosis in the visualized venous segments of the left lower extremity.         DX-CHEST-FOR LINE PLACEMENT Perform procedure in: Patient's Room   Final Result      1.  Interval insertion of a central venous catheter which terminates with the tip projecting over the expected region of the mid to distal superior vena cava.   2.  No acute cardiopulmonary.      CT-EXTREMITY, LOWER W/O LEFT   Final Result      1.  Left lower extremity edema versus cellulitis. No drainable fluid collections on this noncontrast study.   2.  Enlarged left external iliac and inguinal lymph nodes. They may be reactive to the left lower extremity infection, however, neoplastic nodes are difficult to rule out. Close clinical follow-up is needed.      DX-CHEST-PORTABLE (1 VIEW)   Final Result      No acute cardiopulmonary abnormality.         MR-ABDOMEN-WITH & W/O    (Results Pending)   MR-FEMUR-WITH & W/O LEFT    (Results Pending)   MR-PELVIS-WITH & W/O AND SEQUENCES    (Results Pending)        Assessment/Plan  * Septic shock (HCC)- (present on admission)  Assessment & Plan  This is Sepsis Present on admission  SIRS criteria identified on my evaluation include: Tachycardia, with heart rate greater than 90 BPM, Leukocytosis, with WBC greater than 12,000, and Bandemia, greater than 10% bands  Clinical indicators of end organ dysfunction include Hypotension with systolic blood pressure less than 90 or MAP less than 65, Hypotension with decrease in systolic blood pressure of more than 40mmHg, and  Lactic Acid greater than 2  Source is cellulitis  Sepsis protocol initiated  Crystalloid Fluid Administration: Fluid resuscitation ordered per standard protocol - 30 mL/kg per current or ideal body weight  IV antibiotics as appropriate for source of sepsis  Reassessment: I have reassessed the patient's hemodynamic status     WBC 38.2  - Discussed again with infectious disease, DC pen G, start Unasyn, DC linezolid  --  discussed with Dr. Samuel, no need for surgical involvement at this time if there is concern of an abscess then recommends needle aspiration.  --Ultrasound of the left groin, upper thigh does not show obvious abscess that could be aspirated for culture, possible need of MRI    Hypokalemia- (present on admission)  Assessment & Plan  K 3.4, replacing  PO    Hypophosphatemia- (present on admission)  Assessment & Plan  Phos 1.6 replacing with IV K-Phos    Bacteremia due to Streptococcus- (present on admission)  Assessment & Plan  4/13/24 blood culture positive for strep group A  4/15  -cultures negative x 2  Unless there is another abscess to aspirate, treating group A strep, continue with Unasyn  - I consulted ID  - I ordered echocardiogram -which did not show any signs of vegetations  -Repeat CT left lower extremity shows small questionable fluid collection but more consistent with cellulitis.  Erythema left side abdomen improving and leg and groin look much better.  Appreciate general surgery reconsultation    Metabolic acidosis- (present on admission)  Assessment & Plan  Likely due to reduced intravascular volume and secondary to sepsis.  Continue IV fluids  Bicarb 23    Hyponatremia- (present on admission)  Assessment & Plan  Hypovolemic hyponatremia   Continue IV fluids  Sodium 136    Dehydration- (present on admission)  Assessment & Plan  Resolved, IV diuresis prn      Acute kidney injury (HCC)- (present on admission)  Assessment & Plan  Resolved, now with volume overload, continue as needed  Lasix    Tobacco dependence- (present on admission)  Assessment & Plan  Counseled on admission    Hyperglycemia- (present on admission)  Assessment & Plan  With marked hyperglycemia  Continue SSI, Accu-Cheks and hypoglycemia protocol    Cellulitis of left lower extremity- (present on admission)  Assessment & Plan  - I discussed with ID pharmacy, I recommended converting antibiotics to continue linezolid, pen G 24,000,000 units  Discussed with Dr. Kendall, appreciate eval, appreciate surgical evaluation as well  Significant elevation in WBC count today, afebrile, patient states overall he is feeling better         VTE prophylaxis:    heparin ppx      I have performed a physical exam and reviewed and updated ROS and Plan today (4/20/2024). In review of yesterday's note (4/19/2024), there are no changes except as documented above.

## 2024-04-20 NOTE — PROGRESS NOTES
"Mr. Oneal Tillman is a 53 y.o. male who presented to the hospital with left leg pain.  He was found to have a cellulitis and blood cultures grew group a streptococcus    S: He is having some pain in his left thigh today which she describes as mild.  Otherwise no change from yesterday.  No nausea vomiting.    O:  /64   Pulse 86   Temp 37.6 °C (99.6 °F) (Temporal)   Resp 18   Ht 1.803 m (5' 10.98\")   Wt 79.9 kg (176 lb 2.4 oz)   SpO2 95%   BMI 24.58 kg/m²     GEN: awake, alert oriented  CV: RRR, brisk cap refill on hands  RESP: no labored breathing  ABD: soft, nontender, nondistended  Ext: left leg edematous; erythema on the lateral aspect of the upper thigh going to the left hip and flank, minimal tenderness to palpation, no crepitus palpated.  He had some blistering behind his left knee has now turned into some petechial hemorrhage which is nontender and less edematous than on presentation.  His leg overall is less swollen and the medial erythema has significant proved.  Lateral erythema slightly worse today compared to yesterday    Recent Labs     04/17/24 0130 04/18/24 0100 04/19/24  0119   WBC 22.3* 31.5* 33.5*   RBC 3.93* 3.82* 3.85*   HEMOGLOBIN 11.9* 11.4* 11.7*   HEMATOCRIT 35.8* 34.1* 34.9*   MCV 91.1 89.3 90.6   MCH 30.3 29.8 30.4   RDW 46.4 44.8 45.6   PLATELETCT 184 265 297   MPV 11.6 10.5 10.2   NEUTSPOLYS  --  69.00 63.00   LYMPHOCYTES  --  19.00* 6.00*   MONOCYTES  --  5.00 7.00   EOSINOPHILS  --  1.00 0.00   BASOPHILS  --  0.00 0.00   RBCMORPHOLO  --  Normal Normal     Recent Labs     04/17/24 0130 04/18/24 0100 04/19/24  0119   SODIUM 130* 134* 135   POTASSIUM 3.4* 3.6 4.1   CHLORIDE 96 98 101   CO2 21 26 24   GLUCOSE 118* 99 108*   BUN 15 15 15     No results found for: \"INR\"    Imaging: CT of the lower extremity shows a small fluid collection tracking up the muscle and between the muscle and the subcutaneous tissue with low Hounsfield units concerning for simple fluid and is " likely just separation and degloving of the muscle and subcutaneous fatty tissue    A/P:   Mr. Oneal Tillman is a 53 y.o. male who presented to the hospital with left leg cellulitis.  Symptomatically he has improved significantly however his white count has increased today from yesterday.  There are no signs of necrotizing soft tissue infection.  Continue antibiotics.  If concern for abscess formation recommend needle sampling of the fluid collection to see if this is pus or just simple fluid.  Ultrasound earlier today showed no fluid collection.  I will have them repeat the ultrasound and I have made the patient aware of exactly where they should be looking so he can help guide the ultrasound tech.    Mike Samuel MD  Thoracic & General Surgeon  [facility]  [phone]

## 2024-04-20 NOTE — PROGRESS NOTES
Infectious Disease Progress Note    Author: Christopher Kendall M.D. Date & Time of service: 2024  6:26 PM    Chief Complaint:  Follow-up for cellulitis    Interval History:   patient remains afebrile and white count slightly down to 22.3, CT scan with curvilinear fluid collection lateral thigh but no obvious air   Tmax 99.6, erythema continue to track up the lateral abdominal wall and white count continuing to trend up, 33.5 today.  Cultures as below    Labs Reviewed and Medications Reviewed.    Review of Systems:  Review of Systems   Constitutional:  Negative for chills and fever.   Musculoskeletal:  Positive for myalgias.   Skin:  Negative for rash.       Hemodynamics:  Temp (24hrs), Av.1 °C (98.7 °F), Min:36.6 °C (97.8 °F), Max:37.6 °C (99.6 °F)  Temperature: 37.6 °C (99.6 °F)  Pulse  Av.9  Min: 57  Max: 112   Blood Pressure: 114/64       Physical Exam:  Physical Exam  Vitals and nursing note reviewed.   Constitutional:       General: He is not in acute distress.     Appearance: He is not ill-appearing.   Eyes:      Conjunctiva/sclera: Conjunctivae normal.   Cardiovascular:      Rate and Rhythm: Normal rate.   Pulmonary:      Effort: Pulmonary effort is normal. No respiratory distress.      Breath sounds: No stridor.   Abdominal:      General: There is no distension.      Palpations: Abdomen is soft.   Musculoskeletal:         General: Swelling and tenderness present.      Comments: There appears to be 2 noncontiguous components to the left lower extremity cellulitis.  The left leg cellulitis appears to have nearly completely resolved with dusky erythema, improvement of swelling, minimal tenderness.  However there is a separate proximal lateral thigh component of erythema, firmness, tenderness and increased warmth spreading to the groin and now tracking up to the lateral abdominal wall over the past few days.  Pictures taken   Skin:     Findings: No erythema or rash.   Neurological:       General: No focal deficit present.      Mental Status: He is alert.         Meds:    Current Facility-Administered Medications:     ampicillin-sulbactam (UNASYN) IV    acetaminophen    senna-docusate **AND** polyethylene glycol/lytes    Notify provider if pain remains uncontrolled **AND** Use the Numeric Rating Scale (NRS), Green-Baker Faces (WBF), or FLACC on regular floors and Critical-Care Pain Observation Tool (CPOT) on ICUs/Trauma to assess pain **AND** Pulse Ox **AND** Pharmacy Consult Request **AND** If patient difficult to arouse and/or has respiratory depression (respiratory rate of 10 or less), stop any opiates that are currently infusing and call a Rapid Response.    oxyCODONE immediate-release **OR** oxyCODONE immediate-release **OR** HYDROmorphone    ondansetron    ondansetron    promethazine    promethazine    prochlorperazine    heparin    nicotine **AND** Nicotine Replacement Patient Education Materials **AND** nicotine polacrilex    Labs:  Recent Labs     04/17/24 0130 04/18/24 0100 04/19/24 0119   WBC 22.3* 31.5* 33.5*   RBC 3.93* 3.82* 3.85*   HEMOGLOBIN 11.9* 11.4* 11.7*   HEMATOCRIT 35.8* 34.1* 34.9*   MCV 91.1 89.3 90.6   MCH 30.3 29.8 30.4   RDW 46.4 44.8 45.6   PLATELETCT 184 265 297   MPV 11.6 10.5 10.2   NEUTSPOLYS  --  69.00 63.00   LYMPHOCYTES  --  19.00* 6.00*   MONOCYTES  --  5.00 7.00   EOSINOPHILS  --  1.00 0.00   BASOPHILS  --  0.00 0.00   RBCMORPHOLO  --  Normal Normal     Recent Labs     04/17/24 0130 04/18/24 0100 04/19/24  0119   SODIUM 130* 134* 135   POTASSIUM 3.4* 3.6 4.1   CHLORIDE 96 98 101   CO2 21 26 24   GLUCOSE 118* 99 108*   BUN 15 15 15     Recent Labs     04/17/24 0130 04/18/24 0100 04/19/24  0119   ALBUMIN  --   --  2.2*   TBILIRUBIN  --   --  0.3   ALKPHOSPHAT  --   --  190*   TOTPROTEIN  --   --  5.1*   ALTSGPT  --   --  33   ASTSGOT  --   --  42   CREATININE 0.81 0.80 0.79       Imaging:  CT-EXTREMITY, LOWER WITH LEFT    Result Date: 4/16/2024 4/16/2024  3:06 PM HISTORY/REASON FOR EXAM:  Worsening And Ascending Erythema/Cellulitis. TECHNIQUE/EXAM DESCRIPTION AND NUMBER OF VIEWS:  CT scan of the LEFT lower extremity with contrast, with reconstructions. Thin helical 3 mm sections were obtained from the distal femur through the proximal tibia/fibula. Sagittal and coronal multiplanar reconstructions were generated from the axial images. A total of 100 mL of Omnipaque 350 nonionic contrast was administered  IV without complication. Up to date radiation dose reduction adjustments have been utilized to meet ALARA standards for radiation dose reduction. COMPARISON: None. FINDINGS: There are curvilinear areas of fluid tissue attenuation involving the subcutaneous tissues of the left thigh and lower leg most pronounced involving the lateral aspect of the thigh where it measures 1.3 cm in greatest diameter. The muscles of the left thigh and lower leg have a normal appearance and enhancement pattern. There is no evidence of deep abscess formation. There is no evidence of acute osteomyelitis     1.  Subcutaneous inflammatory changes about the left lower leg and thigh suspicious for cellulitis. 2.  Curvilinear fluid attenuation adjacent to the musculature of the lateral aspect of the left thigh measuring 1.3 cm in greatest diameter suspicious for subcutaneous seroma less likely early abscess formation.    EC-ECHOCARDIOGRAM COMPLETE W/O CONT    Result Date: 4/15/2024  Transthoracic Echo Report Echocardiography Laboratory CONCLUSIONS No prior study is available for comparison. Normal left ventricular systolic function. The left ventricular ejection fraction is visually estimated to be 65%. Mild tricuspid regurgitation. Estimated right ventricular systolic pressure is 35 mmHg. JA FINLEY Exam Date:         04/15/2024                    15:05 Exam Location:     Inpatient Priority:          Routine Ordering Physician:        IVONE CARLTON                            TRIAGE  Referring Physician:       BRANDT Nguyen Sonographer:               Edin ALEJANDRO Age:    53     Gender:    M MRN:    [id number] :    1970 BSA:    2      Ht (in):    71     Wt (lb):    176 Exam Type:     Complete Indications:     Endocarditis ICD Codes:       421 CPT Codes:       41599 BP:   92     /   51     HR: Technical Quality:       Fair MEASUREMENTS  (Male / Female) Normal Values 2D ECHO LV Diastolic Diameter PLAX        5.1 cm                4.2 - 5.9 / 3.9 - 5.3 cm LV Systolic Diameter PLAX         3.5 cm                2.1 - 4.0 cm IVS Diastolic Thickness           0.95 cm               LVPW Diastolic Thickness          1 cm                  LVOT Diameter                     2.2 cm                Estimated LV Ejection Fraction    65 %                  LV Ejection Fraction MOD BP       66.2 %                >= 55  % LV Ejection Fraction MOD 4C       71.1 %                LV Ejection Fraction MOD 2C       60.2 %                DOPPLER AV Peak Velocity                  1.3 m/s               AV Peak Gradient                  6.9 mmHg              AV Mean Gradient                  3.9 mmHg              LVOT Peak Velocity                1.1 m/s               AV Area Cont Eq vti               2.8 cm2               MV Velocity Time Integral         18.1 cm               Mitral E Point Velocity           0.8 m/s               Mitral E to A Ratio               1.1                   MV Pressure Half Time             37.5 ms               MV Area PHT                       5.9 cm2               MV Deceleration Time              129 ms                TR Peak Velocity                  283 cm/s              PV Peak Velocity                  0.99 m/s              PV Peak Gradient                  3.9 mmHg              * Indicates values subject to auto-interpretation LV EF:  65    % FINDINGS Left Ventricle Normal left ventricular chamber size. Normal left ventricular wall thickness. Normal left ventricular systolic  function. The left ventricular ejection fraction is visually estimated to be 65%. Normal regional wall motion. Normal diastolic function. Right Ventricle The right ventricle is normal in size and systolic function. Right Atrium The right atrium is normal in size. Normal inferior vena cava size and inspiratory collapse. Left Atrium The left atrium is normal in size. Left atrial volume index is 23 mL/sq m. Mitral Valve Structurally normal mitral valve without significant stenosis or regurgitation. Aortic Valve Structurally normal aortic valve without significant stenosis or regurgitation. Tricuspid Valve Structurally normal tricuspid valve without significant stenosis. Mild tricuspid regurgitation. Right atrial pressure is estimated to be 3 mmHg. Estimated right ventricular systolic pressure is 35 mmHg. Pulmonic Valve Structurally normal pulmonic valve without significant stenosis or regurgitation. Pericardium No pericardial effusion. Aorta Normal aortic root for body surface area. The ascending aorta is borderline dilated with a diameter of  4.0 cm. Ricky Gaines M.D. (Electronically Signed) Final Date:     15 April 2024                 16:06    US-EXTREMITY VENOUS LOWER UNILAT LEFT    Result Date: 4/14/2024  CLINICAL INFORMATION: Left lower extremity pain/edema. PROCEDURE: Ultrasound examination of left lower extremity deep venous system was performed using grayscale, color and spectral Doppler in the ultrasound section. COMPARISON: None. FINDINGS: LEFT: The left common femoral, saphenofemoral junction, femoral, and popliteal veins demonstrate a normal response to augmentation and compression maneuvers. There is also a normal response to respiratory variation. The bifurcation of the common femoral vein into the superficial and deep femoral veins is visualized.  Flow is identified in these vessels with no evidence of intraluminal thrombus on either color Doppler or grayscale images. The visualized portions of the  Detail Level: Simple left proximal calf veins are also normal.     No evidence of acute deep venous thrombosis in the visualized venous segments of the left lower extremity.     DX-CHEST-FOR LINE PLACEMENT Perform procedure in: Patient's Room    Result Date: 4/13/2024 4/13/2024 9:11 PM HISTORY/REASON FOR EXAM:  Other (Comment); line. TECHNIQUE/EXAM DESCRIPTION AND NUMBER OF VIEWS: Single view of the chest. COMPARISON: None FINDINGS: There has been interval insertion of a central venous catheter which terminates with the tip projecting over the expected region of the mid to distal superior vena cava. Heart size is within normal limits. No pulmonary infiltrates or consolidations are noted. No pleural abnormalities are noted.     1.  Interval insertion of a central venous catheter which terminates with the tip projecting over the expected region of the mid to distal superior vena cava. 2.  No acute cardiopulmonary.    CT-EXTREMITY, LOWER W/O LEFT    Result Date: 4/13/2024 4/13/2024 5:02 PM HISTORY/REASON FOR EXAM:  Leg swelling, body aches. TECHNIQUE/EXAM DESCRIPTION AND NUMBER OF VIEWS: CT scan of the LEFT lower extremity without contrast and including reconstructions. Thin-section noncontrast helical images were obtained. Coronal and sagittal reconstructions were generated from the axial images. Up to date radiation dose reduction adjustments have been utilized to meet ALARA standards for radiation dose reduction. COMPARISON: None. FINDINGS: There is extensive left lower extremity edema. Skin thickening and subcutaneous fat stranding is seen extending from the thigh into the calf, ankle and foot. No drainable fluid collections are seen on this noncontrast study. No acute fracture or dislocation. No significant osteoarthrosis. Visualized pelvic organs are unremarkable. Left inguinal and iliac lymphadenopathy. Lymph nodes measure up to 1.4 cm short axis in the left external iliac region.     1.  Left lower extremity edema versus  cellulitis. No drainable fluid collections on this noncontrast study. 2.  Enlarged left external iliac and inguinal lymph nodes. They may be reactive to the left lower extremity infection, however, neoplastic nodes are difficult to rule out. Close clinical follow-up is needed.    DX-CHEST-PORTABLE (1 VIEW)    Result Date: 4/13/2024 4/13/2024 4:19 PM HISTORY/REASON FOR EXAM:  Sepsis. TECHNIQUE/EXAM DESCRIPTION AND NUMBER OF VIEWS: Single portable view of the chest. COMPARISON:  None. FINDINGS: LUNGS: Clear. No effusions. PNEUMOTHORAX: None. LINES AND TUBES: None. MEDIASTINUM: No cardiomegaly. MUSCULOSKELETAL STRUCTURES: No acute displaced fracture.     No acute cardiopulmonary abnormality.       Micro:  Results       Procedure Component Value Units Date/Time    Blood Culture - Draw one from central line and one from peripheral site [[id number]] Collected: 04/13/24 1700    Order Status: Completed Specimen: Blood from Line Updated: 04/18/24 1817     Significant Indicator NEG     Source BLD     Site LINE     Culture Result No growth after 5 days of incubation.  Blood culture testing and Gram stain, if indicated, are  performed at [facility], [address] [place] [address].  Positive blood cultures are  sent to [facility], [address] [place] [address], for organism identification and  susceptibility testing.      Narrative:      Left AC    MRSA By PCR (Amp) [[id number]] Collected: 04/17/24 1144    Order Status: Completed Specimen: Respirate from Nares Updated: 04/17/24 2045     MRSA by PCR Negative    Blood Culture - Draw one from central line and one from peripheral site [[id number]]  (Abnormal)  (Susceptibility) Collected: 04/13/24 1619    Order Status: Completed Specimen: Blood from Peripheral Updated: 04/16/24 0927     Significant Indicator POS     Source BLD     Site PERIPHERAL     Culture Result Growth detected by Bactec instrument. 04/14/2024  06:40      Beta  Hemolytic Streptococcus group A  This isolate does NOT demonstrate inducible Clindamycin  resistance in vitro.      Narrative:      CALL  [facility]  MED tel. [phone],  CALLED  MED tel. [phone] 04/15/2024, 10:10, RB PERF. RESULTS CALLED  TO:José [id number] Rn  Right AC    Susceptibility       Beta hemolytic streptococcus group a (1)       Antibiotic Interpretation Microscan   Method Status    Penicillin Sensitive 0.047 mcg/mL E-TEST Final    Cefotaxime Sensitive 0.023 mcg/mL E-TEST Final    Clindamycin Sensitive - mcg/mL E-TEST Final                       Urine Culture (New) [[id number]] Collected: 04/13/24 2249    Order Status: Completed Specimen: Urine Updated: 04/16/24 0849     Significant Indicator NEG     Source UR     Site -     Culture Result No growth at 48 hours.    Narrative:      Indication for culture:->Emergency Room Patient    BLOOD CULTURE [[id number]] Collected: 04/15/24 1104    Order Status: Completed Specimen: Blood from Peripheral Updated: 04/16/24 0743     Significant Indicator NEG     Source BLD     Site PERIPHERAL     Culture Result No Growth  Note: Blood cultures are incubated for 5 days and  are monitored continuously.Positive blood cultures  are called to the RN and reported as soon as  they are identified.  Blood culture testing and Gram stain, if indicated, are  performed at [facility], [address] [place] [address].  Positive blood cultures are  sent to [facility], [address] [place] [address], for organism identification and  susceptibility testing.      Narrative:      Right Hand    BLOOD CULTURE [[id number]] Collected: 04/15/24 1104    Order Status: Completed Specimen: Blood from Peripheral Updated: 04/16/24 0743     Significant Indicator NEG     Source BLD     Site PERIPHERAL     Culture Result No Growth  Note: Blood cultures are incubated for 5 days and  are monitored continuously.Positive blood cultures  are called to the RN and  reported as soon as  they are identified.  Blood culture testing and Gram stain, if indicated, are  performed at [facility], [address] [place] [address].  Positive blood cultures are  sent to [facility], [address] [place] [address], for organism identification and  susceptibility testing.      Narrative:      Left Hand    CoV-2, Flu A/B, And RSV by PCR ([employer]) [[id number]] Collected: 04/14/24 1004    Order Status: Completed Specimen: Respirate from Nasopharyngeal Updated: 04/14/24 1052     Influenza virus A RNA Negative     Influenza virus B, PCR Negative     RSV, PCR Negative     SARS-CoV-2 by PCR NotDetected     Comment: [facility] providers: PLEASE REFER TO DE-ESCALATION AND RETESTING PROTOCOL  on [email or web address]    **The [employer] GeneXpert Xpress SARS-CoV-2 RT-PCR Test has been made  available for use under the Emergency Use Authorization (EUA) only.          SARS-CoV-2 Source NP Swab    Urinalysis [[id number]]  (Abnormal) Collected: 04/13/24 2249    Order Status: Completed Specimen: Urine Updated: 04/14/24 0005     Color Yellow     Character Hazy     Specific Gravity 1.025     Ph 5.5     Glucose Negative mg/dL      Ketones Trace mg/dL      Protein 30 mg/dL      Bilirubin Negative     Nitrite Negative     Leukocyte Esterase Negative     Occult Blood Negative     Micro Urine Req Microscopic            Hospital Course/Assessment:   Oneal Tillman is a 53 y.o. male with with prior tobacco dependence, at risk for diabetes, admitted with left lower extremity cellulitis and group A strep bacteremia, penicillin susceptible.     Pertinent Diagnoses:  Group A strep bacteremia, penicillin susceptible  Left lower extremity cellulitis, worse   Leukocytosis, worse     Plan:   -There appear to be 2 noncontiguous components of his left lower extremity cellulitis at this time.  The left leg cellulitis has nearly completely resolved.  There is however, an area of lateral  proximal (and medial) thigh cellulitis spreading over the groin and now tracking up the lateral abdominal wall and has continued to do so despite aggressive dual IV antibiotic therapy over the last 3 days (pictures uploaded to Epic), along with a rising neutrophilic leukocytosis over the last 5 days. CT scan with an underlying narrow fluid collection that in this overall clinical picture, is concerning for an abscess or at least a developing abscess. He may need surgical source control, however he has remained quite hemodynamically stable (likely due to the antibiotics slowing down, but not halting, the continued progression of the infectious process) and the bipolar nature of the clinical course of this infection has made this decision difficult as the surgical service is weighing the potential risks of surgical exploration versus benefit.  Currently deferring to their expertise  -Given no evidence of ongoing toxin production, can stop the linezolid, and given concerns for excessive fluid overload requiring diuresis, will switch from penicillin to IV Unasyn 3 g every 6 hours  -Continue to trend the white count  -Recommend MRI with contrast  -HIV and hepatitis C antibodies negative     Disposition: Unable to determine at this time  Need for PICC line: Unable to determine at this time     Plan was discussed with the primary team, Dr Zendejas and Dr. Samuel. ID will follow.  This illness poses a threat to life and limb function

## 2024-04-20 NOTE — PROGRESS NOTES
Infectious Disease Progress Note    Author: Christopher Kendall M.D. Date & Time of service: 2024  2:44 PM    Chief Complaint:  Follow-up for cellulitis    Interval History:   patient remains afebrile and white count slightly down to 22.3, CT scan with curvilinear fluid collection lateral thigh but no obvious air   Tmax 99.6, erythema continue to track up the lateral abdominal wall and white count continuing to trend up, 33.5 today.  Cultures as below   patient afebrile, white count continues to trend up to 38.2, however the erythema tracking of the lateral abdominal wall has noticeably improved and receded.  The swelling of lateral proximal thigh has worsened    Labs Reviewed and Medications Reviewed.    Review of Systems:  Review of Systems   Constitutional:  Negative for chills and fever.   Musculoskeletal:  Positive for myalgias.   Skin:  Negative for rash.       Hemodynamics:  Temp (24hrs), Av.8 °C (98.3 °F), Min:36.2 °C (97.2 °F), Max:37.6 °C (99.6 °F)  Temperature: 36.6 °C (97.8 °F)  Pulse  Av.5  Min: 57  Max: 112   Blood Pressure: 110/57       Physical Exam:  Physical Exam  Vitals and nursing note reviewed.   Constitutional:       General: He is not in acute distress.     Appearance: He is not ill-appearing.   Eyes:      Conjunctiva/sclera: Conjunctivae normal.   Cardiovascular:      Rate and Rhythm: Normal rate.   Pulmonary:      Effort: Pulmonary effort is normal. No respiratory distress.      Breath sounds: No stridor.   Abdominal:      General: There is no distension.      Palpations: Abdomen is soft.   Musculoskeletal:         General: Swelling and tenderness present.      Comments: Left leg cellulitis has nearly completely resolved.  Medial band of erythema on the thigh stable to improved.  Lateral proximal area of thigh swelling worse but appears more distinct and organized.  Erythema previously tracking of the left lateral abdominal wall has noticeably improved and begun to  recede   Skin:     Findings: No erythema or rash.   Neurological:      General: No focal deficit present.      Mental Status: He is alert.         Meds:    Current Facility-Administered Medications:     ampicillin-sulbactam (UNASYN) IV    acetaminophen    senna-docusate **AND** polyethylene glycol/lytes    Notify provider if pain remains uncontrolled **AND** Use the Numeric Rating Scale (NRS), Green-Baker Faces (WBF), or FLACC on regular floors and Critical-Care Pain Observation Tool (CPOT) on ICUs/Trauma to assess pain **AND** Pulse Ox **AND** Pharmacy Consult Request **AND** If patient difficult to arouse and/or has respiratory depression (respiratory rate of 10 or less), stop any opiates that are currently infusing and call a Rapid Response.    oxyCODONE immediate-release **OR** oxyCODONE immediate-release **OR** HYDROmorphone    ondansetron    ondansetron    promethazine    promethazine    prochlorperazine    heparin    nicotine **AND** Nicotine Replacement Patient Education Materials **AND** nicotine polacrilex    Labs:  Recent Labs     04/18/24 0100 04/19/24 0119 04/20/24  0043   WBC 31.5* 33.5* 38.2*   RBC 3.82* 3.85* 3.91*   HEMOGLOBIN 11.4* 11.7* 11.8*   HEMATOCRIT 34.1* 34.9* 35.5*   MCV 89.3 90.6 90.8   MCH 29.8 30.4 30.2   RDW 44.8 45.6 46.8   PLATELETCT 265 297 329   MPV 10.5 10.2 9.8   NEUTSPOLYS 69.00 63.00 59.00   LYMPHOCYTES 19.00* 6.00* 6.00*   MONOCYTES 5.00 7.00 2.00   EOSINOPHILS 1.00 0.00 1.00   BASOPHILS 0.00 0.00 0.00   RBCMORPHOLO Normal Normal Normal     Recent Labs     04/18/24 0100 04/19/24  0119 04/20/24  0043   SODIUM 134* 135 134*   POTASSIUM 3.6 4.1 4.1   CHLORIDE 98 101 100   CO2 26 24 24   GLUCOSE 99 108* 133*   BUN 15 15 14     Recent Labs     04/18/24 0100 04/19/24  0119 04/20/24  0043   ALBUMIN  --  2.2* 2.3*   TBILIRUBIN  --  0.3 0.2   ALKPHOSPHAT  --  190* 229*   TOTPROTEIN  --  5.1* 5.5*   ALTSGPT  --  33 31   ASTSGOT  --  42 35   CREATININE 0.80 0.79 0.77        Imaging:  CT-EXTREMITY, LOWER WITH LEFT    Result Date: 4/16/2024 4/16/2024 3:06 PM HISTORY/REASON FOR EXAM:  Worsening And Ascending Erythema/Cellulitis. TECHNIQUE/EXAM DESCRIPTION AND NUMBER OF VIEWS:  CT scan of the LEFT lower extremity with contrast, with reconstructions. Thin helical 3 mm sections were obtained from the distal femur through the proximal tibia/fibula. Sagittal and coronal multiplanar reconstructions were generated from the axial images. A total of 100 mL of Omnipaque 350 nonionic contrast was administered  IV without complication. Up to date radiation dose reduction adjustments have been utilized to meet ALARA standards for radiation dose reduction. COMPARISON: None. FINDINGS: There are curvilinear areas of fluid tissue attenuation involving the subcutaneous tissues of the left thigh and lower leg most pronounced involving the lateral aspect of the thigh where it measures 1.3 cm in greatest diameter. The muscles of the left thigh and lower leg have a normal appearance and enhancement pattern. There is no evidence of deep abscess formation. There is no evidence of acute osteomyelitis     1.  Subcutaneous inflammatory changes about the left lower leg and thigh suspicious for cellulitis. 2.  Curvilinear fluid attenuation adjacent to the musculature of the lateral aspect of the left thigh measuring 1.3 cm in greatest diameter suspicious for subcutaneous seroma less likely early abscess formation.    EC-ECHOCARDIOGRAM COMPLETE W/O CONT    Result Date: 4/15/2024  Transthoracic Echo Report Echocardiography Laboratory CONCLUSIONS No prior study is available for comparison. Normal left ventricular systolic function. The left ventricular ejection fraction is visually estimated to be 65%. Mild tricuspid regurgitation. Estimated right ventricular systolic pressure is 35 mmHg. JA FINLEY Exam Date:         04/15/2024                    15:05 Exam Location:     Inpatient Priority:          Routine  Ordering Physician:        IVONE CARLTON                            TRIAGE Referring Physician:       [id number] Sonographer:               Edin ALEJANDRO Age:    53     Gender:    M MRN:    [id number] :    1970 BSA:    2      Ht (in):    71     Wt (lb):    176 Exam Type:     Complete Indications:     Endocarditis ICD Codes:       421 CPT Codes:       38148 BP:   92     /   51     HR: Technical Quality:       Fair MEASUREMENTS  (Male / Female) Normal Values 2D ECHO LV Diastolic Diameter PLAX        5.1 cm                4.2 - 5.9 / 3.9 - 5.3 cm LV Systolic Diameter PLAX         3.5 cm                2.1 - 4.0 cm IVS Diastolic Thickness           0.95 cm               LVPW Diastolic Thickness          1 cm                  LVOT Diameter                     2.2 cm                Estimated LV Ejection Fraction    65 %                  LV Ejection Fraction MOD BP       66.2 %                >= 55  % LV Ejection Fraction MOD 4C       71.1 %                LV Ejection Fraction MOD 2C       60.2 %                DOPPLER AV Peak Velocity                  1.3 m/s               AV Peak Gradient                  6.9 mmHg              AV Mean Gradient                  3.9 mmHg              LVOT Peak Velocity                1.1 m/s               AV Area Cont Eq vti               2.8 cm2               MV Velocity Time Integral         18.1 cm               Mitral E Point Velocity           0.8 m/s               Mitral E to A Ratio               1.1                   MV Pressure Half Time             37.5 ms               MV Area PHT                       5.9 cm2               MV Deceleration Time              129 ms                TR Peak Velocity                  283 cm/s              PV Peak Velocity                  0.99 m/s              PV Peak Gradient                  3.9 mmHg              * Indicates values subject to auto-interpretation LV EF:  65    % FINDINGS Left Ventricle Normal left ventricular chamber  size. Normal left ventricular wall thickness. Normal left ventricular systolic function. The left ventricular ejection fraction is visually estimated to be 65%. Normal regional wall motion. Normal diastolic function. Right Ventricle The right ventricle is normal in size and systolic function. Right Atrium The right atrium is normal in size. Normal inferior vena cava size and inspiratory collapse. Left Atrium The left atrium is normal in size. Left atrial volume index is 23 mL/sq m. Mitral Valve Structurally normal mitral valve without significant stenosis or regurgitation. Aortic Valve Structurally normal aortic valve without significant stenosis or regurgitation. Tricuspid Valve Structurally normal tricuspid valve without significant stenosis. Mild tricuspid regurgitation. Right atrial pressure is estimated to be 3 mmHg. Estimated right ventricular systolic pressure is 35 mmHg. Pulmonic Valve Structurally normal pulmonic valve without significant stenosis or regurgitation. Pericardium No pericardial effusion. Aorta Normal aortic root for body surface area. The ascending aorta is borderline dilated with a diameter of  4.0 cm. Ricky Gaines M.D. (Electronically Signed) Final Date:     15 April 2024                 16:06    US-EXTREMITY VENOUS LOWER UNILAT LEFT    Result Date: 4/14/2024  CLINICAL INFORMATION: Left lower extremity pain/edema. PROCEDURE: Ultrasound examination of left lower extremity deep venous system was performed using grayscale, color and spectral Doppler in the ultrasound section. COMPARISON: None. FINDINGS: LEFT: The left common femoral, saphenofemoral junction, femoral, and popliteal veins demonstrate a normal response to augmentation and compression maneuvers. There is also a normal response to respiratory variation. The bifurcation of the common femoral vein into the superficial and deep femoral veins is visualized.  Flow is identified in these vessels with no evidence of intraluminal thrombus  on either color Doppler or grayscale images. The visualized portions of the left proximal calf veins are also normal.     No evidence of acute deep venous thrombosis in the visualized venous segments of the left lower extremity.     DX-CHEST-FOR LINE PLACEMENT Perform procedure in: Patient's Room    Result Date: 4/13/2024 4/13/2024 9:11 PM HISTORY/REASON FOR EXAM:  Other (Comment); line. TECHNIQUE/EXAM DESCRIPTION AND NUMBER OF VIEWS: Single view of the chest. COMPARISON: None FINDINGS: There has been interval insertion of a central venous catheter which terminates with the tip projecting over the expected region of the mid to distal superior vena cava. Heart size is within normal limits. No pulmonary infiltrates or consolidations are noted. No pleural abnormalities are noted.     1.  Interval insertion of a central venous catheter which terminates with the tip projecting over the expected region of the mid to distal superior vena cava. 2.  No acute cardiopulmonary.    CT-EXTREMITY, LOWER W/O LEFT    Result Date: 4/13/2024 4/13/2024 5:02 PM HISTORY/REASON FOR EXAM:  Leg swelling, body aches. TECHNIQUE/EXAM DESCRIPTION AND NUMBER OF VIEWS: CT scan of the LEFT lower extremity without contrast and including reconstructions. Thin-section noncontrast helical images were obtained. Coronal and sagittal reconstructions were generated from the axial images. Up to date radiation dose reduction adjustments have been utilized to meet ALARA standards for radiation dose reduction. COMPARISON: None. FINDINGS: There is extensive left lower extremity edema. Skin thickening and subcutaneous fat stranding is seen extending from the thigh into the calf, ankle and foot. No drainable fluid collections are seen on this noncontrast study. No acute fracture or dislocation. No significant osteoarthrosis. Visualized pelvic organs are unremarkable. Left inguinal and iliac lymphadenopathy. Lymph nodes measure up to 1.4 cm short axis in the  left external iliac region.     1.  Left lower extremity edema versus cellulitis. No drainable fluid collections on this noncontrast study. 2.  Enlarged left external iliac and inguinal lymph nodes. They may be reactive to the left lower extremity infection, however, neoplastic nodes are difficult to rule out. Close clinical follow-up is needed.    DX-CHEST-PORTABLE (1 VIEW)    Result Date: 4/13/2024 4/13/2024 4:19 PM HISTORY/REASON FOR EXAM:  Sepsis. TECHNIQUE/EXAM DESCRIPTION AND NUMBER OF VIEWS: Single portable view of the chest. COMPARISON:  None. FINDINGS: LUNGS: Clear. No effusions. PNEUMOTHORAX: None. LINES AND TUBES: None. MEDIASTINUM: No cardiomegaly. MUSCULOSKELETAL STRUCTURES: No acute displaced fracture.     No acute cardiopulmonary abnormality.       Micro:  Results       Procedure Component Value Units Date/Time    BLOOD CULTURE [[id number]] Collected: 04/15/24 1104    Order Status: Completed Specimen: Blood from Peripheral Updated: 04/20/24 1217     Significant Indicator NEG     Source BLD     Site PERIPHERAL     Culture Result No growth after 5 days of incubation.  Blood culture testing and Gram stain, if indicated, are  performed at [facility], [facility]  [address] [place], [place].  Positive blood cultures are  sent to [facility], [address] [place] [address], for organism identification and  susceptibility testing.      Narrative:      Right Hand    BLOOD CULTURE [[id number]] Collected: 04/15/24 1104    Order Status: Completed Specimen: Blood from Peripheral Updated: 04/20/24 1217     Significant Indicator NEG     Source BLD     Site PERIPHERAL     Culture Result No growth after 5 days of incubation.  Blood culture testing and Gram stain, if indicated, are  performed at [facility], [address], [place].  Positive blood cultures are  sent to [facility], [address] [place] [address], for  organism identification and  susceptibility testing.      Narrative:      Left Hand    Blood Culture - Draw one from central line and one from peripheral site [[id number]] Collected: 04/13/24 1700    Order Status: Completed Specimen: Blood from Line Updated: 04/18/24 1817     Significant Indicator NEG     Source BLD     Site LINE     Culture Result No growth after 5 days of incubation.  Blood culture testing and Gram stain, if indicated, are  performed at [facility], [address] [place] [address].  Positive blood cultures are  [facility] to [facility], [address] [place] [address], for organism identification and  susceptibility testing.      Narrative:      Left AC    MRSA By PCR (Amp) [[id number]] Collected: 04/17/24 1144    Order Status: Completed Specimen: Respirate from Nares Updated: 04/17/24 2045     MRSA by PCR Negative    Blood Culture - Draw one from central line and one from peripheral site [[id number]]  (Abnormal)  (Susceptibility) Collected: 04/13/24 1619    Order Status: Completed Specimen: Blood from Peripheral Updated: 04/16/24 0927     Significant Indicator POS     Source BLD     Site PERIPHERAL     Culture Result Growth detected by Bactec instrument. 04/14/2024  06:40      Beta Hemolytic Streptococcus group A  This isolate does NOT demonstrate inducible Clindamycin  resistance in vitro.      Narrative:      CALL  [facility]  MED tel. [phone],  CALLED  MED tel. [phone] 04/15/2024, 10:10, RB PERF. RESULTS CALLED  TO:José [phone] Rn  Right AC    Susceptibility       Beta hemolytic streptococcus group a (1)       Antibiotic Interpretation Microscan   Method Status    Penicillin Sensitive 0.047 mcg/mL E-TEST Final    Cefotaxime Sensitive 0.023 mcg/mL E-TEST Final    Clindamycin Sensitive - mcg/mL E-TEST Final                       Urine Culture (New) [[id number]] Collected: 04/13/24 2249    Order Status: Completed Specimen: Urine Updated: 04/16/24 0819      Significant Indicator NEG     Source UR     Site -     Culture Result No growth at 48 hours.    Narrative:      Indication for culture:->Emergency Room Patient    CoV-2, Flu A/B, And RSV by PCR (Teepixid) [[id number]] Collected: 04/14/24 1004    Order Status: Completed Specimen: Respirate from Nasopharyngeal Updated: 04/14/24 1052     Influenza virus A RNA Negative     Influenza virus B, PCR Negative     RSV, PCR Negative     SARS-CoV-2 by PCR NotDetected     Comment: [facility] providers: PLEASE REFER TO DE-ESCALATION AND RETESTING PROTOCOL  on inside[email or web address]    **The Turned On Digital GeneXpert Xpress SARS-CoV-2 RT-PCR Test has been made  available for use under the Emergency Use Authorization (EUA) only.          SARS-CoV-2 Source NP Swab    Urinalysis [[id number]]  (Abnormal) Collected: 04/13/24 2249    Order Status: Completed Specimen: Urine Updated: 04/14/24 0005     Color Yellow     Character Hazy     Specific Gravity 1.025     Ph 5.5     Glucose Negative mg/dL      Ketones Trace mg/dL      Protein 30 mg/dL      Bilirubin Negative     Nitrite Negative     Leukocyte Esterase Negative     Occult Blood Negative     Micro Urine Req Microscopic            Hospital Course/Assessment:   Oneal Tillman is a 53 y.o. male with with prior tobacco dependence, at risk for diabetes, admitted with left lower extremity cellulitis and group A strep bacteremia, penicillin susceptible.     Pertinent Diagnoses:  Group A strep bacteremia, penicillin susceptible  Left lower extremity cellulitis  Leukocytosis, worse     Plan:   -The left leg cellulitis has nearly completely resolved.  His white count however continues to trend up, nearly 40,000 today. There is a region of swelling and tenderness over the lateral proximal thigh closely related to the previously noted narrow fluid collection on CT scan thigh that appears to be evolving into a more distinct area, potentially evolving into an abscess. This may need surgical source control especially  if the leukocytosis continues to rise, however patient has remained quite hemodynamically stable, likely due to the antibiotics slowing down, but not halting, the underlying infectious process.  This has made the decision of surgical drainage difficult and the surgical services are weighing the potential risks of exploration versus benefit. Fortunately today, the erythema that was tracking up his left lateral abdominal wall has noticeably improved and has begun to recede, enabling more time to make this decision  -Continue IV Unasyn 3 g every 6 hours (using instead of penicillin to decrease the fluid load -previously was requiring frequent diuresis by the primary team)  -Continue to trend the white count  -Recommend MRI with contrast, pending  -HIV and hepatitis C antibodies negative     Disposition: Unable to determine at this time  Need for PICC line: Unable to determine at this time     Plan was discussed with the primary team, Dr Zendejas. ID will follow.  This illness poses a threat to life and limb function

## 2024-04-20 NOTE — CARE PLAN
The patient is Stable - Low risk of patient condition declining or worsening    Shift Goals  Clinical Goals: pain management, IV ABX  Patient Goals: rest and comfort  Family Goals: AHSAN    Progress made toward(s) clinical / shift goals:    Problem: Pain - Standard  Goal: Alleviation of pain or a reduction in pain to the patient’s comfort goal  Outcome: Progressing  Note: Pt reports 8/10 left leg pain, medicated per MAR. Pt educated on use of call light for pain management needs.      Problem: Knowledge Deficit - Standard  Goal: Patient and family/care givers will demonstrate understanding of plan of care, disease process/condition, diagnostic tests and medications  Outcome: Progressing  Note: Pt updated on POC, agreeable. Imaging scheduled for tomorrow. All questions and concerns addressed at this time.      Problem: Fall Risk  Goal: Patient will remain free from falls  Outcome: Progressing

## 2024-04-21 ENCOUNTER — APPOINTMENT (OUTPATIENT)
Dept: RADIOLOGY | Facility: MEDICAL CENTER | Age: 54
DRG: 871 | End: 2024-04-21
Attending: INTERNAL MEDICINE
Payer: COMMERCIAL

## 2024-04-21 LAB
ANION GAP SERPL CALC-SCNC: 9 MMOL/L (ref 7–16)
BASOPHILS # BLD AUTO: 0 % (ref 0–1.8)
BASOPHILS # BLD: 0 K/UL (ref 0–0.12)
BUN SERPL-MCNC: 12 MG/DL (ref 8–22)
CALCIUM SERPL-MCNC: 7.5 MG/DL (ref 8.4–10.2)
CHLORIDE SERPL-SCNC: 101 MMOL/L (ref 96–112)
CO2 SERPL-SCNC: 23 MMOL/L (ref 20–33)
CREAT SERPL-MCNC: 0.55 MG/DL (ref 0.5–1.4)
EOSINOPHIL # BLD AUTO: 0.67 K/UL (ref 0–0.51)
EOSINOPHIL NFR BLD: 2 % (ref 0–6.9)
ERYTHROCYTE [DISTWIDTH] IN BLOOD BY AUTOMATED COUNT: 46.4 FL (ref 35.9–50)
GFR SERPLBLD CREATININE-BSD FMLA CKD-EPI: 117 ML/MIN/1.73 M 2
GLUCOSE SERPL-MCNC: 109 MG/DL (ref 65–99)
HCT VFR BLD AUTO: 36.8 % (ref 42–52)
HGB BLD-MCNC: 12 G/DL (ref 14–18)
LYMPHOCYTES # BLD AUTO: 3.01 K/UL (ref 1–4.8)
LYMPHOCYTES NFR BLD: 9 % (ref 22–41)
MANUAL DIFF BLD: NORMAL
MCH RBC QN AUTO: 29.8 PG (ref 27–33)
MCHC RBC AUTO-ENTMCNC: 32.6 G/DL (ref 32.3–36.5)
MCV RBC AUTO: 91.3 FL (ref 81.4–97.8)
METAMYELOCYTES NFR BLD MANUAL: 3 %
MONOCYTES # BLD AUTO: 2 K/UL (ref 0–0.85)
MONOCYTES NFR BLD AUTO: 6 % (ref 0–13.4)
MYELOCYTES NFR BLD MANUAL: 2 %
NEUTROPHILS # BLD AUTO: 26.05 K/UL (ref 1.82–7.42)
NEUTROPHILS NFR BLD: 70 % (ref 44–72)
NEUTS BAND NFR BLD MANUAL: 8 % (ref 0–10)
NRBC # BLD AUTO: 0 K/UL
NRBC BLD-RTO: 0 /100 WBC (ref 0–0.2)
PLATELET # BLD AUTO: 392 K/UL (ref 164–446)
PLATELET BLD QL SMEAR: NORMAL
PMV BLD AUTO: 9.3 FL (ref 9–12.9)
POTASSIUM SERPL-SCNC: 4.4 MMOL/L (ref 3.6–5.5)
RBC # BLD AUTO: 4.03 M/UL (ref 4.7–6.1)
RBC BLD AUTO: NORMAL
SODIUM SERPL-SCNC: 133 MMOL/L (ref 135–145)
TOXIC GRANULES BLD QL SMEAR: NORMAL
WBC # BLD AUTO: 33.4 K/UL (ref 4.8–10.8)

## 2024-04-21 PROCEDURE — A9579 GAD-BASE MR CONTRAST NOS,1ML: HCPCS | Performed by: INTERNAL MEDICINE

## 2024-04-21 PROCEDURE — 36415 COLL VENOUS BLD VENIPUNCTURE: CPT

## 2024-04-21 PROCEDURE — 770020 HCHG ROOM/CARE - TELE (206)

## 2024-04-21 PROCEDURE — 99232 SBSQ HOSP IP/OBS MODERATE 35: CPT | Performed by: INTERNAL MEDICINE

## 2024-04-21 PROCEDURE — 88185 FLOWCYTOMETRY/TC ADD-ON: CPT

## 2024-04-21 PROCEDURE — 85027 COMPLETE CBC AUTOMATED: CPT

## 2024-04-21 PROCEDURE — 73720 MRI LWR EXTREMITY W/O&W/DYE: CPT | Mod: LT

## 2024-04-21 PROCEDURE — 99233 SBSQ HOSP IP/OBS HIGH 50: CPT | Performed by: INTERNAL MEDICINE

## 2024-04-21 PROCEDURE — 700117 HCHG RX CONTRAST REV CODE 255: Performed by: INTERNAL MEDICINE

## 2024-04-21 PROCEDURE — 88184 FLOWCYTOMETRY/ TC 1 MARKER: CPT

## 2024-04-21 PROCEDURE — 94760 N-INVAS EAR/PLS OXIMETRY 1: CPT

## 2024-04-21 PROCEDURE — 700111 HCHG RX REV CODE 636 W/ 250 OVERRIDE (IP): Performed by: HOSPITALIST

## 2024-04-21 PROCEDURE — 700111 HCHG RX REV CODE 636 W/ 250 OVERRIDE (IP): Mod: JZ | Performed by: INTERNAL MEDICINE

## 2024-04-21 PROCEDURE — 700105 HCHG RX REV CODE 258: Performed by: INTERNAL MEDICINE

## 2024-04-21 PROCEDURE — 85007 BL SMEAR W/DIFF WBC COUNT: CPT

## 2024-04-21 PROCEDURE — A9270 NON-COVERED ITEM OR SERVICE: HCPCS | Performed by: HOSPITALIST

## 2024-04-21 PROCEDURE — 700102 HCHG RX REV CODE 250 W/ 637 OVERRIDE(OP): Performed by: HOSPITALIST

## 2024-04-21 PROCEDURE — 80048 BASIC METABOLIC PNL TOTAL CA: CPT

## 2024-04-21 RX ADMIN — GADOTERIDOL 15 ML: 279.3 INJECTION, SOLUTION INTRAVENOUS at 19:55

## 2024-04-21 RX ADMIN — AMPICILLIN AND SULBACTAM 3 G: 1; 2 INJECTION, POWDER, FOR SOLUTION INTRAMUSCULAR; INTRAVENOUS at 23:46

## 2024-04-21 RX ADMIN — OXYCODONE HYDROCHLORIDE 10 MG: 10 TABLET ORAL at 19:01

## 2024-04-21 RX ADMIN — OXYCODONE HYDROCHLORIDE 10 MG: 10 TABLET ORAL at 11:28

## 2024-04-21 RX ADMIN — OXYCODONE HYDROCHLORIDE 10 MG: 10 TABLET ORAL at 15:35

## 2024-04-21 RX ADMIN — AMPICILLIN AND SULBACTAM 3 G: 1; 2 INJECTION, POWDER, FOR SOLUTION INTRAMUSCULAR; INTRAVENOUS at 06:42

## 2024-04-21 RX ADMIN — AMPICILLIN AND SULBACTAM 3 G: 1; 2 INJECTION, POWDER, FOR SOLUTION INTRAMUSCULAR; INTRAVENOUS at 17:15

## 2024-04-21 RX ADMIN — AMPICILLIN AND SULBACTAM 3 G: 1; 2 INJECTION, POWDER, FOR SOLUTION INTRAMUSCULAR; INTRAVENOUS at 11:29

## 2024-04-21 RX ADMIN — OXYCODONE HYDROCHLORIDE 10 MG: 10 TABLET ORAL at 02:43

## 2024-04-21 RX ADMIN — HEPARIN SODIUM 5000 UNITS: 5000 INJECTION, SOLUTION INTRAVENOUS; SUBCUTANEOUS at 15:35

## 2024-04-21 RX ADMIN — HEPARIN SODIUM 5000 UNITS: 5000 INJECTION, SOLUTION INTRAVENOUS; SUBCUTANEOUS at 06:33

## 2024-04-21 RX ADMIN — NICOTINE 14 MG: 14 PATCH, EXTENDED RELEASE TRANSDERMAL at 06:33

## 2024-04-21 RX ADMIN — OXYCODONE HYDROCHLORIDE 10 MG: 10 TABLET ORAL at 06:33

## 2024-04-21 RX ADMIN — OXYCODONE HYDROCHLORIDE 10 MG: 10 TABLET ORAL at 22:13

## 2024-04-21 RX ADMIN — HEPARIN SODIUM 5000 UNITS: 5000 INJECTION, SOLUTION INTRAVENOUS; SUBCUTANEOUS at 22:13

## 2024-04-21 RX ADMIN — AMPICILLIN AND SULBACTAM 3 G: 1; 2 INJECTION, POWDER, FOR SOLUTION INTRAMUSCULAR; INTRAVENOUS at 00:24

## 2024-04-21 ASSESSMENT — FIBROSIS 4 INDEX: FIB4 SCORE: 1.03

## 2024-04-21 ASSESSMENT — ENCOUNTER SYMPTOMS
MYALGIAS: 0
PHOTOPHOBIA: 0
INSOMNIA: 0
HEARTBURN: 0
DEPRESSION: 0
CLAUDICATION: 0
ABDOMINAL PAIN: 0
FEVER: 0
DIZZINESS: 0
VOMITING: 0
BLURRED VISION: 0
MYALGIAS: 1
DIARRHEA: 0
HEADACHES: 0
NERVOUS/ANXIOUS: 0
SPEECH CHANGE: 0
WEAKNESS: 0
CONSTIPATION: 0
CHILLS: 0
COUGH: 0
SHORTNESS OF BREATH: 0
SENSORY CHANGE: 0

## 2024-04-21 ASSESSMENT — PAIN DESCRIPTION - PAIN TYPE
TYPE: ACUTE PAIN

## 2024-04-21 NOTE — PROGRESS NOTES
Telemetry Shift Summary    Rhythm SR  HR Range 78-84  Ectopy rPVC  Measurements 0.20/0.08/0.34      Normal Values  Rhythm SR  HR Range:   Measurements: 0.12-0.20/0.06-0.10/0.30-0.52

## 2024-04-21 NOTE — PROGRESS NOTES
"Mr. Oneal Tillman is a 53 y.o. male who presented to the hospital with left leg pain.  He was found to have a cellulitis and blood cultures grew group a streptococcus    S: Pain in his thigh improved today.  Feels better today than at any other point in hospitalization.  No nausea vomiting. Hasn't ambulated    O:  BP 95/52   Pulse 90   Temp 36.8 °C (98.2 °F) (Temporal)   Resp 19   Ht 1.803 m (5' 10.98\")   Wt 77.5 kg (170 lb 13.7 oz)   SpO2 93%   BMI 23.84 kg/m²     GEN: awake, alert oriented  CV: RRR, brisk cap refill on hands  RESP: no labored breathing  ABD: soft, nontender, nondistended  Ext: left leg less edematous; erythema on the left leg, hip and left flank all improved; minimal tenderness to palpation, no crepitus palpated.  He had some blistering behind his left knee has now turned into some petechial hemorrhage which is nontender.  He has three areas with the most erythema, lateral lower thigh, lateral upper thigh and medial upper thigh; no fluctuance palpated in these areas  Recent Labs     04/19/24 0119 04/20/24  0043 04/21/24  0606   WBC 33.5* 38.2* 33.4*   RBC 3.85* 3.91* 4.03*   HEMOGLOBIN 11.7* 11.8* 12.0*   HEMATOCRIT 34.9* 35.5* 36.8*   MCV 90.6 90.8 91.3   MCH 30.4 30.2 29.8   RDW 45.6 46.8 46.4   PLATELETCT 297 329 392   MPV 10.2 9.8 9.3   NEUTSPOLYS 63.00 59.00 70.00   LYMPHOCYTES 6.00* 6.00* 9.00*   MONOCYTES 7.00 2.00 6.00   EOSINOPHILS 0.00 1.00 2.00   BASOPHILS 0.00 0.00 0.00   RBCMORPHOLO Normal Normal Normal     Recent Labs     04/19/24  0119 04/20/24  0043 04/21/24  0606   SODIUM 135 134* 133*   POTASSIUM 4.1 4.1 4.4   CHLORIDE 101 100 101   CO2 24 24 23   GLUCOSE 108* 133* 109*   BUN 15 14 12     No results found for: \"INR\"    Imaging: CT of the lower extremity shows a small fluid collection tracking up the muscle and between the muscle and the subcutaneous tissue with low Hounsfield units concerning for simple fluid and is likely just separation and degloving of the muscle and " subcutaneous fatty tissue    A/P:   Mr. Oneal Tillman is a 53 y.o. male who presented to the hospital with left leg cellulitis.  Symptomatically he has improved significantly however his white count Is now decreasing.  There are no signs of necrotizing soft tissue infection. Continue antibiotics.  Formal ultrasounds found no fluid collections. MRI pending. If residual fluid collections we can potentially have IR perform US guided aspiration.      Mike Samuel MD  Thoracic & General Surgeon  [facility]  [phone]

## 2024-04-21 NOTE — PROGRESS NOTES
"Patient without BM since 4/16/24. Patient refusing interventions. Patient states \"I'll worry about when I need to go to the bathroom, not you.\" Education provided. Patient continues to refuse.   "

## 2024-04-21 NOTE — PROGRESS NOTES
Telemetry Shift Summary    Rhythm SR  HR Range 82-86   Ectopy R PAC  Measurements 0.16/0.08/0.40      Normal Values  Rhythm SR  HR Range:   Measurements: 0.12-0.20/0.06-0.10/0.30-0.52

## 2024-04-21 NOTE — PROGRESS NOTES
Infectious Disease Progress Note    Author: Christopher Kendall M.D. Date & Time of service: 2024  1:11 PM    Chief Complaint:  Follow-up for cellulitis    Interval History:   patient remains afebrile and white count slightly down to 22.3, CT scan with curvilinear fluid collection lateral thigh but no obvious air   Tmax 99.6, erythema continue to track up the lateral abdominal wall and white count continuing to trend up, 33.5 today.  Cultures as below   patient afebrile, white count continues to trend up to 38.2, however the erythema tracking of the lateral abdominal wall has noticeably improved and receded.  The swelling of lateral proximal thigh has worsened   patient remains afebrile, erythema of the lateral abdominal wall continues to recede and has nearly completely resolved, and white count finally decreased today, down to 33.4, creatinine 0.55    Labs Reviewed and Medications Reviewed.    Review of Systems:  Review of Systems   Constitutional:  Negative for chills and fever.   Musculoskeletal:  Positive for myalgias.   Skin:  Negative for rash.       Hemodynamics:  Temp (24hrs), Av.6 °C (97.9 °F), Min:36.4 °C (97.5 °F), Max:36.9 °C (98.5 °F)  Temperature: 36.6 °C (97.9 °F)  Pulse  Av  Min: 57  Max: 112   Blood Pressure: 111/57       Physical Exam:  Physical Exam  Vitals and nursing note reviewed.   Constitutional:       General: He is not in acute distress.     Appearance: He is not ill-appearing.   Eyes:      Conjunctiva/sclera: Conjunctivae normal.   Cardiovascular:      Rate and Rhythm: Normal rate.   Pulmonary:      Effort: Pulmonary effort is normal. No respiratory distress.      Breath sounds: No stridor.   Abdominal:      General: There is no distension.      Palpations: Abdomen is soft.   Musculoskeletal:         General: Swelling and tenderness present.      Comments: Significant overall improvement this afternoon.  Erythema previously tracking of the left lateral abdominal  wall has noticeably improved and continued to recede, area of lateral proximal thigh swelling also somewhat improved, some improvement in the erythema there   Skin:     Findings: No erythema or rash.   Neurological:      General: No focal deficit present.      Mental Status: He is alert.         Meds:    Current Facility-Administered Medications:     ampicillin-sulbactam (UNASYN) IV    acetaminophen    senna-docusate **AND** polyethylene glycol/lytes    Notify provider if pain remains uncontrolled **AND** Use the Numeric Rating Scale (NRS), Green-Baker Faces (WBF), or FLACC on regular floors and Critical-Care Pain Observation Tool (CPOT) on ICUs/Trauma to assess pain **AND** Pulse Ox **AND** Pharmacy Consult Request **AND** If patient difficult to arouse and/or has respiratory depression (respiratory rate of 10 or less), stop any opiates that are currently infusing and call a Rapid Response.    oxyCODONE immediate-release **OR** oxyCODONE immediate-release **OR** HYDROmorphone    ondansetron    ondansetron    promethazine    promethazine    prochlorperazine    heparin    nicotine **AND** Nicotine Replacement Patient Education Materials **AND** nicotine polacrilex    Labs:  Recent Labs     04/19/24 0119 04/20/24  0043 04/21/24  0606   WBC 33.5* 38.2* 33.4*   RBC 3.85* 3.91* 4.03*   HEMOGLOBIN 11.7* 11.8* 12.0*   HEMATOCRIT 34.9* 35.5* 36.8*   MCV 90.6 90.8 91.3   MCH 30.4 30.2 29.8   RDW 45.6 46.8 46.4   PLATELETCT 297 329 392   MPV 10.2 9.8 9.3   NEUTSPOLYS 63.00 59.00 70.00   LYMPHOCYTES 6.00* 6.00* 9.00*   MONOCYTES 7.00 2.00 6.00   EOSINOPHILS 0.00 1.00 2.00   BASOPHILS 0.00 0.00 0.00   RBCMORPHOLO Normal Normal Normal     Recent Labs     04/19/24  0119 04/20/24  0043 04/21/24  0606   SODIUM 135 134* 133*   POTASSIUM 4.1 4.1 4.4   CHLORIDE 101 100 101   CO2 24 24 23   GLUCOSE 108* 133* 109*   BUN 15 14 12     Recent Labs     04/19/24  0119 04/20/24  0043 04/21/24  0606   ALBUMIN 2.2* 2.3*  --    TBILIRUBIN 0.3  0.2  --    ALKPHOSPHAT 190* 229*  --    TOTPROTEIN 5.1* 5.5*  --    ALTSGPT 33 31  --    ASTSGOT 42 35  --    CREATININE 0.79 0.77 0.55       Imaging:  CT-EXTREMITY, LOWER WITH LEFT    Result Date: 4/16/2024 4/16/2024 3:06 PM HISTORY/REASON FOR EXAM:  Worsening And Ascending Erythema/Cellulitis. TECHNIQUE/EXAM DESCRIPTION AND NUMBER OF VIEWS:  CT scan of the LEFT lower extremity with contrast, with reconstructions. Thin helical 3 mm sections were obtained from the distal femur through the proximal tibia/fibula. Sagittal and coronal multiplanar reconstructions were generated from the axial images. A total of 100 mL of Omnipaque 350 nonionic contrast was administered  IV without complication. Up to date radiation dose reduction adjustments have been utilized to meet ALARA standards for radiation dose reduction. COMPARISON: None. FINDINGS: There are curvilinear areas of fluid tissue attenuation involving the subcutaneous tissues of the left thigh and lower leg most pronounced involving the lateral aspect of the thigh where it measures 1.3 cm in greatest diameter. The muscles of the left thigh and lower leg have a normal appearance and enhancement pattern. There is no evidence of deep abscess formation. There is no evidence of acute osteomyelitis     1.  Subcutaneous inflammatory changes about the left lower leg and thigh suspicious for cellulitis. 2.  Curvilinear fluid attenuation adjacent to the musculature of the lateral aspect of the left thigh measuring 1.3 cm in greatest diameter suspicious for subcutaneous seroma less likely early abscess formation.    EC-ECHOCARDIOGRAM COMPLETE W/O CONT    Result Date: 4/15/2024  Transthoracic Echo Report Echocardiography Laboratory CONCLUSIONS No prior study is available for comparison. Normal left ventricular systolic function. The left ventricular ejection fraction is visually estimated to be 65%. Mild tricuspid regurgitation. Estimated right ventricular systolic pressure is  35 mmHg. JA FINLEY Exam Date:         04/15/2024                    15:05 Exam Location:     Inpatient Priority:          Routine Ordering Physician:        IVONE CARLTON                            TRIAGE Referring Physician:       BRANDT Nguyen Sonographer:               Edin ALEJANDRO Age:    53     Gender:    M MRN:    [id number] :    1970 BSA:    2      Ht (in):    71     Wt (lb):    176 Exam Type:     Complete Indications:     Endocarditis ICD Codes:       421 CPT Codes:       54103 BP:   92     /   51     HR: Technical Quality:       Fair MEASUREMENTS  (Male / Female) Normal Values 2D ECHO LV Diastolic Diameter PLAX        5.1 cm                4.2 - 5.9 / 3.9 - 5.3 cm LV Systolic Diameter PLAX         3.5 cm                2.1 - 4.0 cm IVS Diastolic Thickness           0.95 cm               LVPW Diastolic Thickness          1 cm                  LVOT Diameter                     2.2 cm                Estimated LV Ejection Fraction    65 %                  LV Ejection Fraction MOD BP       66.2 %                >= 55  % LV Ejection Fraction MOD 4C       71.1 %                LV Ejection Fraction MOD 2C       60.2 %                DOPPLER AV Peak Velocity                  1.3 m/s               AV Peak Gradient                  6.9 mmHg              AV Mean Gradient                  3.9 mmHg              LVOT Peak Velocity                1.1 m/s               AV Area Cont Eq vti               2.8 cm2               MV Velocity Time Integral         18.1 cm               Mitral E Point Velocity           0.8 m/s               Mitral E to A Ratio               1.1                   MV Pressure Half Time             37.5 ms               MV Area PHT                       5.9 cm2               MV Deceleration Time              129 ms                TR Peak Velocity                  283 cm/s              PV Peak Velocity                  0.99 m/s              PV Peak Gradient                  3.9 mmHg               * Indicates values subject to auto-interpretation LV EF:  65    % FINDINGS Left Ventricle Normal left ventricular chamber size. Normal left ventricular wall thickness. Normal left ventricular systolic function. The left ventricular ejection fraction is visually estimated to be 65%. Normal regional wall motion. Normal diastolic function. Right Ventricle The right ventricle is normal in size and systolic function. Right Atrium The right atrium is normal in size. Normal inferior vena cava size and inspiratory collapse. Left Atrium The left atrium is normal in size. Left atrial volume index is 23 mL/sq m. Mitral Valve Structurally normal mitral valve without significant stenosis or regurgitation. Aortic Valve Structurally normal aortic valve without significant stenosis or regurgitation. Tricuspid Valve Structurally normal tricuspid valve without significant stenosis. Mild tricuspid regurgitation. Right atrial pressure is estimated to be 3 mmHg. Estimated right ventricular systolic pressure is 35 mmHg. Pulmonic Valve Structurally normal pulmonic valve without significant stenosis or regurgitation. Pericardium No pericardial effusion. Aorta Normal aortic root for body surface area. The ascending aorta is borderline dilated with a diameter of  4.0 cm. Ricky Gaines M.D. (Electronically Signed) Final Date:     15 April 2024                 16:06    US-EXTREMITY VENOUS LOWER UNILAT LEFT    Result Date: 4/14/2024  CLINICAL INFORMATION: Left lower extremity pain/edema. PROCEDURE: Ultrasound examination of left lower extremity deep venous system was performed using grayscale, color and spectral Doppler in the ultrasound section. COMPARISON: None. FINDINGS: LEFT: The left common femoral, saphenofemoral junction, femoral, and popliteal veins demonstrate a normal response to augmentation and compression maneuvers. There is also a normal response to respiratory variation. The bifurcation of the common femoral vein  into the superficial and deep femoral veins is visualized.  Flow is identified in these vessels with no evidence of intraluminal thrombus on either color Doppler or grayscale images. The visualized portions of the left proximal calf veins are also normal.     No evidence of acute deep venous thrombosis in the visualized venous segments of the left lower extremity.     DX-CHEST-FOR LINE PLACEMENT Perform procedure in: Patient's Room    Result Date: 4/13/2024 4/13/2024 9:11 PM HISTORY/REASON FOR EXAM:  Other (Comment); line. TECHNIQUE/EXAM DESCRIPTION AND NUMBER OF VIEWS: Single view of the chest. COMPARISON: None FINDINGS: There has been interval insertion of a central venous catheter which terminates with the tip projecting over the expected region of the mid to distal superior vena cava. Heart size is within normal limits. No pulmonary infiltrates or consolidations are noted. No pleural abnormalities are noted.     1.  Interval insertion of a central venous catheter which terminates with the tip projecting over the expected region of the mid to distal superior vena cava. 2.  No acute cardiopulmonary.    CT-EXTREMITY, LOWER W/O LEFT    Result Date: 4/13/2024 4/13/2024 5:02 PM HISTORY/REASON FOR EXAM:  Leg swelling, body aches. TECHNIQUE/EXAM DESCRIPTION AND NUMBER OF VIEWS: CT scan of the LEFT lower extremity without contrast and including reconstructions. Thin-section noncontrast helical images were obtained. Coronal and sagittal reconstructions were generated from the axial images. Up to date radiation dose reduction adjustments have been utilized to meet ALARA standards for radiation dose reduction. COMPARISON: None. FINDINGS: There is extensive left lower extremity edema. Skin thickening and subcutaneous fat stranding is seen extending from the thigh into the calf, ankle and foot. No drainable fluid collections are seen on this noncontrast study. No acute fracture or dislocation. No significant osteoarthrosis.  Visualized pelvic organs are unremarkable. Left inguinal and iliac lymphadenopathy. Lymph nodes measure up to 1.4 cm short axis in the left external iliac region.     1.  Left lower extremity edema versus cellulitis. No drainable fluid collections on this noncontrast study. 2.  Enlarged left external iliac and inguinal lymph nodes. They may be reactive to the left lower extremity infection, however, neoplastic nodes are difficult to rule out. Close clinical follow-up is needed.    DX-CHEST-PORTABLE (1 VIEW)    Result Date: 4/13/2024 4/13/2024 4:19 PM HISTORY/REASON FOR EXAM:  Sepsis. TECHNIQUE/EXAM DESCRIPTION AND NUMBER OF VIEWS: Single portable view of the chest. COMPARISON:  None. FINDINGS: LUNGS: Clear. No effusions. PNEUMOTHORAX: None. LINES AND TUBES: None. MEDIASTINUM: No cardiomegaly. MUSCULOSKELETAL STRUCTURES: No acute displaced fracture.     No acute cardiopulmonary abnormality.       Micro:  Results       Procedure Component Value Units Date/Time    BLOOD CULTURE [[id number]] Collected: 04/15/24 1104    Order Status: Completed Specimen: Blood from Peripheral Updated: 04/20/24 1217     Significant Indicator NEG     Source BLD     Site PERIPHERAL     Culture Result No growth after 5 days of incubation.  Blood culture testing and Gram stain, if indicated, are  performed at [facility], [address] [place] [address].  Positive blood cultures are  sent to [facility], [address] [place] [address], for organism identification and  susceptibility testing.      Narrative:      Right Hand    BLOOD CULTURE [[id number]] Collected: 04/15/24 1104    Order Status: Completed Specimen: Blood from Peripheral Updated: 04/20/24 1217     Significant Indicator NEG     Source BLD     Site PERIPHERAL     Culture Result No growth after 5 days of incubation.  Blood culture testing and Gram stain, if indicated, are  performed at [facility],  [address] [place] [address].  Positive blood cultures are  sent to [facility], [address] [place] [address], for organism identification and  susceptibility testing.      Narrative:      Left Hand    Blood Culture - Draw one from central line and one from peripheral site [[id number]] Collected: 04/13/24 1700    Order Status: Completed Specimen: Blood from Line Updated: 04/18/24 1817     Significant Indicator NEG     Source BLD     Site LINE     Culture Result No growth after 5 days of incubation.  Blood culture testing and Gram stain, if indicated, are  performed at [facility], [address] [place] [address].  Positive blood cultures are  sent to [facility], [address] [place] [address], for organism identification and  susceptibility testing.      Narrative:      Left AC    MRSA By PCR (Amp) [[id number]] Collected: 04/17/24 1144    Order Status: Completed Specimen: Respirate from Nares Updated: 04/17/24 2045     MRSA by PCR Negative    Blood Culture - Draw one from central line and one from peripheral site [[id number]]  (Abnormal)  (Susceptibility) Collected: 04/13/24 1619    Order Status: Completed Specimen: Blood from Peripheral Updated: 04/16/24 0927     Significant Indicator POS     Source BLD     Site PERIPHERAL     Culture Result Growth detected by Bactec instrument. 04/14/2024  06:40      Beta Hemolytic Streptococcus group A  This isolate does NOT demonstrate inducible Clindamycin  resistance in vitro.      Narrative:      CALL  [facility]  MED tel. [phone],  CALLED  MED tel. [phone] 04/15/2024, 10:10, RB PERF. RESULTS CALLED  TO:José Frank27 Rn  Right AC    Susceptibility       Beta hemolytic streptococcus group a (1)       Antibiotic Interpretation Microscan   Method Status    Penicillin Sensitive 0.047 mcg/mL E-TEST Final    Cefotaxime Sensitive 0.023 mcg/mL E-TEST Final    Clindamycin Sensitive - mcg/mL E-TEST Final                        Urine Culture (New) [[id number]] Collected: 04/13/24 2249    Order Status: Completed Specimen: Urine Updated: 04/16/24 0849     Significant Indicator NEG     Source UR     Site -     Culture Result No growth at 48 hours.    Narrative:      Indication for culture:->Emergency Room Patient            Hospital Course/Assessment:   Oneal Tillman is a 53 y.o. male with with prior tobacco dependence, at risk for diabetes, admitted with left lower extremity cellulitis and group A strep bacteremia, penicillin susceptible.     Pertinent Diagnoses:  Group A strep bacteremia, penicillin susceptible  Left lower extremity cellulitis  Leukocytosis     Plan:   -There appears to have been a reversal in clinical course after switch to Unasyn, perhaps suggesting the presence of a gram-negative organism as well that did not grow in cultures. White count finally has slightly come down today -33.5, and erythema that was previously tracking up the left lateral abdominal wall has continued to recede while over the last couple of days.  The region of swelling and tenderness over the lateral proximal thigh closely related to previously noted narrow fluid collection on CT scan is still present (though also improved) and MRI is pending  -This patient has been continued to manage conservatively thus requiring a longer than usual course of antibiotics in the hospital, monitor closely for adverse effects.  If MRI positive for prieto abscess, may still need surgical source control, unless clinical and white count improvement continues. If no evidence of prieto abscess and the white count continues to trend down consistently, anticipate an additional 2 weeks of antibiotics given burden of infection.  An oral option is p.o. Augmentin 875 mg twice daily  -Continue IV Unasyn 3 g every 6 hours for now (using instead of penicillin to decrease the fluid load -previously was requiring frequent diuresis by the primary team)  -Continue to trend  the white count  -MRI with contrast, pending  -HIV and hepatitis C antibodies negative     Disposition: Anticipate no ID specific disposition needs  Need for PICC line: Likely no     Plan was discussed with the primary team, Dr Zendejas. ID will sign off.  This illness poses a threat to life and limb function.  Please call us back if any worsening.  ID clinic follow-up in 2 to 3 weeks.  Okay to schedule with NP Shell

## 2024-04-21 NOTE — CARE PLAN
The patient is Stable - Low risk of patient condition declining or worsening    Shift Goals  Clinical Goals: Pain management, IV ABX  Patient Goals: Comfort  Family Goals: AHSAN    Progress made toward(s) clinical / shift goals:    Problem: Knowledge Deficit - Standard  Goal: Patient and family/care givers will demonstrate understanding of plan of care, disease process/condition, diagnostic tests and medications  Outcome: Progressing  Note: Plan of care discussed with patient. Patient understands plan of care.     Problem: Fall Risk  Goal: Patient will remain free from falls  Outcome: Progressing  Note: Patient to remain free of falls. Patient calls when he needs assistance.        Patient is not progressing towards the following goals:      Problem: Pain - Standard  Goal: Alleviation of pain or a reduction in pain to the patient’s comfort goal  Outcome: Not Progressing  Flowsheets (Taken 4/21/2024 0135)  Pain Rating Scale (NPRS): 7  Note: Patient not progressing in pain management. Patient is requiring 10 mg oxy tabs Q3-4 hours.

## 2024-04-21 NOTE — PROGRESS NOTES
Hospital Medicine Daily Progress Note    Date of Service  4/21/2024    Chief Complaint  Oneal Tillman is a 53 y.o. male admitted 4/13/2024 with left leg pain and erythema    Hospital Course  Patient is a 53-year-old male with diabetes and tobacco dependence who presented with leg swelling of the left leg for 1 week and body aches.  Questionable spider bite on the left leg after he changed his pants and then 2 days later started having worsening pain redness and swelling.  Also noticed chills palpitations weakness.  Tachycardic with a heart rate of 104 and low blood pressure.  His initial lactic acid was 4.3 and white count was 15.  Patient was initially admitted and given fluid resuscitation and started on IV antibiotics.  Blood cultures came back positive for group B strep and infectious disease recommended in addition to continuation of Zyvox to add pen G.  He is having worsening left leg erythema and ascending rash with blistering lesions.  Repeat CT left leg pending.    Interval Problem Update  4/16 patient states he is having significant pain in the left leg, thinks he feels his thigh is softer but he still having excruciating pain.  Denies any fevers overnight but is diaphoretic at times.  Patient now with bulla in the left groin and significant scrotal edema.  IV fluids discontinued as his acute kidney injury has resolved, single dose IV Lasix ordered, repeat CT left lower extremity per infectious disease recommendations to make sure there is not significant progression or gas producing lesion.  4/17 patient seen this morning and then later discussed again with infectious disease this afternoon.  On my evaluation his left leg was less edematous and less erythematous.  Throughout the afternoon however ID has noticed that the erythema and slight edema has now affected his left upper thigh all the way to the left lower abdomen.  WBC count is slightly better.  Patient has been afebrile.  The CT leg showed a very  small fluid collection which likely is not amenable at this time for surgical intervention or aspiration of the area.  Again low threshold to reconsult surgery if there is any worsening of the patient's status however we will continue with antibiotics and will give another dose of diuresis.  4/18 patient doing about the same from a physical standpoint erythema remains up to the left flank, patient overall is feeling better but given his elevated WBC, ID recommended general surgery reconsultation.  General surgery does not feel that intervention from their standpoint is appropriate at this time.  If there is an attainable fluid collection aspiration recommended.  WBC count up to 31.5 today.  Patient is very frustrated with his current plan of care given the possibility of upcoming surgery and the fact that he was labeled to be diabetic because of elevated blood glucose.  As surgery is not happening today regular diet has been reinitiated.  4/19 patient states overall he is feeling better.  The erythema on his left flank is the same if not slightly better and less superior.  The blister in the left groin has ruptured, there is firmness to the left medial thigh and ultrasound will be done over this area to see if there is any evidence of abscess that could be aspirated.  4/20 patient with visual improvement to his flank erythema and edema.  It has regressed past 3 days worth of demarcation.  The majority of his erythema is the left lateral thigh.  Multiple MRIs pending to help image the area to make sure that infection is improving.  WBC count continues to elevate but patient clinically looks better today.  4/21 WBC count is actually regressing finally.  Down to 33.4.  The erythema seems to have localized to the left lateral thigh and most superior aspect is the left inguinal fold.  He does have some fluctuance left lateral thigh however is not tender.  Awaiting MRI of the left leg, abdomen pelvis with contrast.    I  have discussed this patient's plan of care and discharge plan at IDT rounds today with Case Management, Nursing, Nursing leadership, and other members of the IDT team.    Consultants/Specialty  infectious disease  General surgery    Code Status  Full Code    Disposition  The patient is not medically cleared for discharge to home or a post-acute facility.  Anticipate discharge to: home with close outpatient follow-up    I have placed the appropriate orders for post-discharge needs.    Review of Systems  Review of Systems   Constitutional:  Negative for chills and fever.   HENT:  Negative for congestion.    Eyes:  Negative for blurred vision and photophobia.   Respiratory:  Negative for cough and shortness of breath.    Cardiovascular:  Positive for leg swelling (Left leg and scrotal swelling - improving). Negative for chest pain and claudication.   Gastrointestinal:  Negative for abdominal pain, constipation, diarrhea, heartburn and vomiting.   Genitourinary:  Negative for dysuria and hematuria.   Musculoskeletal:  Negative for joint pain and myalgias.   Skin:  Positive for rash. Negative for itching.   Neurological:  Negative for dizziness, sensory change, speech change, weakness and headaches.   Psychiatric/Behavioral:  Negative for depression. The patient is not nervous/anxious and does not have insomnia.         Physical Exam  Temp:  [36.4 °C (97.5 °F)-36.9 °C (98.5 °F)] 36.6 °C (97.9 °F)  Pulse:  [80-90] 87  Resp:  [18-19] 18  BP: ()/(52-67) 111/57  SpO2:  [93 %-98 %] 98 %    Physical Exam  Vitals and nursing note reviewed.   Constitutional:       General: He is not in acute distress.     Appearance: Normal appearance.   HENT:      Head: Normocephalic and atraumatic.   Eyes:      General: No scleral icterus.     Extraocular Movements: Extraocular movements intact.   Cardiovascular:      Rate and Rhythm: Normal rate and regular rhythm.      Pulses: Normal pulses.      Heart sounds: Normal heart sounds. No  murmur heard.  Pulmonary:      Effort: Pulmonary effort is normal. No respiratory distress.      Breath sounds: Normal breath sounds. No wheezing, rhonchi or rales.   Abdominal:      General: Abdomen is flat. Bowel sounds are normal. There is no distension.      Palpations: Abdomen is soft.      Tenderness: There is no rebound.      Comments: Erythema no longer present left lower abdomen or flank, ends at the inguinal fold     Genitourinary:     Comments: Scrotal edema improved.    Musculoskeletal:         General: No swelling or tenderness.      Cervical back: Normal range of motion and neck supple.      Left lower leg: Edema (slightly improved) present.   Lymphadenopathy:      Cervical: No cervical adenopathy.   Skin:     General: Skin is warm.      Coloration: Skin is not jaundiced.      Findings: Bruising (bruising left popliteal area improving, less edmatious.) present. No erythema.      Comments: Blisters resolved, have ruptured   Neurological:      General: No focal deficit present.      Mental Status: He is alert and oriented to person, place, and time. Mental status is at baseline.      Cranial Nerves: No cranial nerve deficit.   Psychiatric:         Mood and Affect: Mood normal.         Behavior: Behavior normal.         Fluids    Intake/Output Summary (Last 24 hours) at 4/21/2024 1358  Last data filed at 4/21/2024 1312  Gross per 24 hour   Intake 600 ml   Output 1750 ml   Net -1150 ml       Laboratory  Recent Labs     04/19/24  0119 04/20/24  0043 04/21/24  0606   WBC 33.5* 38.2* 33.4*   RBC 3.85* 3.91* 4.03*   HEMOGLOBIN 11.7* 11.8* 12.0*   HEMATOCRIT 34.9* 35.5* 36.8*   MCV 90.6 90.8 91.3   MCH 30.4 30.2 29.8   MCHC 33.5 33.2 32.6   RDW 45.6 46.8 46.4   PLATELETCT 297 329 392   MPV 10.2 9.8 9.3     Recent Labs     04/19/24  0119 04/20/24  0043 04/21/24  0606   SODIUM 135 134* 133*   POTASSIUM 4.1 4.1 4.4   CHLORIDE 101 100 101   CO2 24 24 23   GLUCOSE 108* 133* 109*   BUN 15 14 12   CREATININE 0.79 0.77  0.55   CALCIUM 7.5* 7.7* 7.5*                   Imaging  US-ABDOMEN LTD (SOFT TISSUE)   Final Result         1. No discrete fluid collection identified.      US-EXTREMITY NON VASCULAR UNILATERAL LEFT   Final Result      Edema without a discrete fluid collection.      CT-EXTREMITY, LOWER WITH LEFT   Final Result      1.  Subcutaneous inflammatory changes about the left lower leg and thigh suspicious for cellulitis.      2.  Curvilinear fluid attenuation adjacent to the musculature of the lateral aspect of the left thigh measuring 1.3 cm in greatest diameter suspicious for subcutaneous seroma less likely early abscess formation.      EC-ECHOCARDIOGRAM COMPLETE W/O CONT   Final Result      US-EXTREMITY VENOUS LOWER UNILAT LEFT   Final Result         No evidence of acute deep venous thrombosis in the visualized venous segments of the left lower extremity.         DX-CHEST-FOR LINE PLACEMENT Perform procedure in: Patient's Room   Final Result      1.  Interval insertion of a central venous catheter which terminates with the tip projecting over the expected region of the mid to distal superior vena cava.   2.  No acute cardiopulmonary.      CT-EXTREMITY, LOWER W/O LEFT   Final Result      1.  Left lower extremity edema versus cellulitis. No drainable fluid collections on this noncontrast study.   2.  Enlarged left external iliac and inguinal lymph nodes. They may be reactive to the left lower extremity infection, however, neoplastic nodes are difficult to rule out. Close clinical follow-up is needed.      DX-CHEST-PORTABLE (1 VIEW)   Final Result      No acute cardiopulmonary abnormality.         MR-ABDOMEN-WITH & W/O    (Results Pending)   MR-FEMUR-WITH & W/O LEFT    (Results Pending)   MR-PELVIS-WITH & W/O AND SEQUENCES    (Results Pending)        Assessment/Plan  * Septic shock (HCC)- (present on admission)  Assessment & Plan  This is Sepsis Present on admission  SIRS criteria identified on my evaluation include:  Tachycardia, with heart rate greater than 90 BPM, Leukocytosis, with WBC greater than 12,000, and Bandemia, greater than 10% bands  Clinical indicators of end organ dysfunction include Hypotension with systolic blood pressure less than 90 or MAP less than 65, Hypotension with decrease in systolic blood pressure of more than 40mmHg, and Lactic Acid greater than 2  Source is cellulitis  Sepsis protocol initiated  Crystalloid Fluid Administration: Fluid resuscitation ordered per standard protocol - 30 mL/kg per current or ideal body weight  IV antibiotics as appropriate for source of sepsis  Reassessment: I have reassessed the patient's hemodynamic status     WBC 33.4  - Discussed again with infectious disease, GERTRUDE pen G, start Unasyn, DC linezolid  --  discussed with Dr. Samuel, no need for surgical involvement at this time if there is concern of an abscess then recommends needle aspiration.  --Ultrasound of the left groin, upper thigh does not show obvious abscess that could be aspirated for culture,  -- MRI of the left thigh to be done today and MRI of abdomen pelvis likely tomorrow as MRI states they cannot get all 3 studies done today.    Hypokalemia- (present on admission)  Assessment & Plan  K 3.4, replacing  PO    Hypophosphatemia- (present on admission)  Assessment & Plan  Phos 1.6 replacing with IV K-Phos    Bacteremia due to Streptococcus- (present on admission)  Assessment & Plan  4/13/24 blood culture positive for strep group A  4/15  -cultures negative x 2  Unless there is another abscess to aspirate, treating group A strep, continue with Unasyn  - I consulted ID  - I ordered echocardiogram -which did not show any signs of vegetations  -Repeat CT left lower extremity shows small questionable fluid collection but more consistent with cellulitis.  Erythema left side abdomen improving and leg and groin look much better.  Appreciate general surgery reconsultation  On Zosyn to Augmentin for total of 14-day  treatment once appropriate, appreciate ID and surgery recommendations.    Metabolic acidosis- (present on admission)  Assessment & Plan  Likely due to reduced intravascular volume and secondary to sepsis.  Continue IV fluids  Bicarb 23    Hyponatremia- (present on admission)  Assessment & Plan  Hypovolemic hyponatremia   Continue IV fluids  Sodium 136    Dehydration- (present on admission)  Assessment & Plan  Resolved, IV diuresis prn      Acute kidney injury (HCC)- (present on admission)  Assessment & Plan  Resolved, now with volume overload, continue as needed Lasix    Tobacco dependence- (present on admission)  Assessment & Plan  Counseled on admission    Hyperglycemia- (present on admission)  Assessment & Plan  With marked hyperglycemia  Continue SSI, Accu-Cheks and hypoglycemia protocol    Cellulitis of left lower extremity- (present on admission)  Assessment & Plan  - I discussed with ID pharmacy, I recommended converting antibiotics to continue linezolid, pen G 24,000,000 units  Discussed with Dr. Kendall, appreciate eval, appreciate surgical evaluation as well  Significant elevation in WBC count today, afebrile, patient states overall he is feeling better         VTE prophylaxis:    heparin ppx      I have performed a physical exam and reviewed and updated ROS and Plan today (4/21/2024). In review of yesterday's note (4/20/2024), there are no changes except as documented above.

## 2024-04-21 NOTE — CARE PLAN
The patient is Watcher - Medium risk of patient condition declining or worsening    Shift Goals  Clinical Goals: pain mangement, IV abx  Patient Goals: comfort  Family Goals: AHSAN    Progress made toward(s) clinical / shift goals:  IV abx continued, plan for MRI series to be completed tomorrow.         Problem: Knowledge Deficit - Standard  Goal: Patient and family/care givers will demonstrate understanding of plan of care, disease process/condition, diagnostic tests and medications  Outcome: Progressing     Problem: Fall Risk  Goal: Patient will remain free from falls  Outcome: Progressing     Problem: Pain - Standard  Goal: Alleviation of pain or a reduction in pain to the patient’s comfort goal  4/20/2024 1829 by MARTINEZ WattN.  Outcome: Progressing  4/20/2024 1827 by Madyson Andersen RPepitoN.  Note: PRN pain medication regimen continued.      Patient is not progressing towards the following goals:

## 2024-04-22 LAB
ANION GAP SERPL CALC-SCNC: 11 MMOL/L (ref 7–16)
BUN SERPL-MCNC: 14 MG/DL (ref 8–22)
CALCIUM SERPL-MCNC: 7.4 MG/DL (ref 8.4–10.2)
CHLORIDE SERPL-SCNC: 101 MMOL/L (ref 96–112)
CO2 SERPL-SCNC: 22 MMOL/L (ref 20–33)
CREAT SERPL-MCNC: 0.66 MG/DL (ref 0.5–1.4)
ERYTHROCYTE [DISTWIDTH] IN BLOOD BY AUTOMATED COUNT: 47.2 FL (ref 35.9–50)
GFR SERPLBLD CREATININE-BSD FMLA CKD-EPI: 111 ML/MIN/1.73 M 2
GLUCOSE SERPL-MCNC: 169 MG/DL (ref 65–99)
HCT VFR BLD AUTO: 38.3 % (ref 42–52)
HGB BLD-MCNC: 12.2 G/DL (ref 14–18)
MCH RBC QN AUTO: 29.7 PG (ref 27–33)
MCHC RBC AUTO-ENTMCNC: 31.9 G/DL (ref 32.3–36.5)
MCV RBC AUTO: 93.2 FL (ref 81.4–97.8)
PLATELET # BLD AUTO: 497 K/UL (ref 164–446)
PMV BLD AUTO: 9.2 FL (ref 9–12.9)
POTASSIUM SERPL-SCNC: 4.2 MMOL/L (ref 3.6–5.5)
RBC # BLD AUTO: 4.11 M/UL (ref 4.7–6.1)
SODIUM SERPL-SCNC: 134 MMOL/L (ref 135–145)
WBC # BLD AUTO: 28.1 K/UL (ref 4.8–10.8)

## 2024-04-22 PROCEDURE — 700102 HCHG RX REV CODE 250 W/ 637 OVERRIDE(OP): Performed by: HOSPITALIST

## 2024-04-22 PROCEDURE — 99232 SBSQ HOSP IP/OBS MODERATE 35: CPT | Performed by: INTERNAL MEDICINE

## 2024-04-22 PROCEDURE — 85027 COMPLETE CBC AUTOMATED: CPT

## 2024-04-22 PROCEDURE — A9270 NON-COVERED ITEM OR SERVICE: HCPCS | Performed by: HOSPITALIST

## 2024-04-22 PROCEDURE — 700111 HCHG RX REV CODE 636 W/ 250 OVERRIDE (IP): Mod: JZ | Performed by: INTERNAL MEDICINE

## 2024-04-22 PROCEDURE — 80048 BASIC METABOLIC PNL TOTAL CA: CPT

## 2024-04-22 PROCEDURE — 700111 HCHG RX REV CODE 636 W/ 250 OVERRIDE (IP): Mod: JZ | Performed by: HOSPITALIST

## 2024-04-22 PROCEDURE — 94760 N-INVAS EAR/PLS OXIMETRY 1: CPT

## 2024-04-22 PROCEDURE — 770020 HCHG ROOM/CARE - TELE (206)

## 2024-04-22 PROCEDURE — 700105 HCHG RX REV CODE 258: Performed by: INTERNAL MEDICINE

## 2024-04-22 RX ORDER — LIDOCAINE HYDROCHLORIDE AND EPINEPHRINE BITARTRATE 20; .01 MG/ML; MG/ML
10 INJECTION, SOLUTION SUBCUTANEOUS ONCE
Status: ACTIVE | OUTPATIENT
Start: 2024-04-22 | End: 2024-04-23

## 2024-04-22 RX ADMIN — AMPICILLIN AND SULBACTAM 3 G: 1; 2 INJECTION, POWDER, FOR SOLUTION INTRAMUSCULAR; INTRAVENOUS at 11:11

## 2024-04-22 RX ADMIN — OXYCODONE HYDROCHLORIDE 10 MG: 10 TABLET ORAL at 14:41

## 2024-04-22 RX ADMIN — AMPICILLIN AND SULBACTAM 3 G: 1; 2 INJECTION, POWDER, FOR SOLUTION INTRAMUSCULAR; INTRAVENOUS at 04:51

## 2024-04-22 RX ADMIN — HEPARIN SODIUM 5000 UNITS: 5000 INJECTION, SOLUTION INTRAVENOUS; SUBCUTANEOUS at 04:48

## 2024-04-22 RX ADMIN — OXYCODONE HYDROCHLORIDE 10 MG: 10 TABLET ORAL at 17:37

## 2024-04-22 RX ADMIN — OXYCODONE HYDROCHLORIDE 10 MG: 10 TABLET ORAL at 08:03

## 2024-04-22 RX ADMIN — HYDROMORPHONE HYDROCHLORIDE 0.5 MG: 1 INJECTION, SOLUTION INTRAMUSCULAR; INTRAVENOUS; SUBCUTANEOUS at 03:04

## 2024-04-22 RX ADMIN — HEPARIN SODIUM 5000 UNITS: 5000 INJECTION, SOLUTION INTRAVENOUS; SUBCUTANEOUS at 21:08

## 2024-04-22 RX ADMIN — HEPARIN SODIUM 5000 UNITS: 5000 INJECTION, SOLUTION INTRAVENOUS; SUBCUTANEOUS at 13:33

## 2024-04-22 RX ADMIN — OXYCODONE HYDROCHLORIDE 10 MG: 10 TABLET ORAL at 21:08

## 2024-04-22 RX ADMIN — NICOTINE 14 MG: 14 PATCH, EXTENDED RELEASE TRANSDERMAL at 04:48

## 2024-04-22 RX ADMIN — OXYCODONE HYDROCHLORIDE 5 MG: 5 TABLET ORAL at 01:27

## 2024-04-22 RX ADMIN — AMPICILLIN AND SULBACTAM 3 G: 1; 2 INJECTION, POWDER, FOR SOLUTION INTRAMUSCULAR; INTRAVENOUS at 17:13

## 2024-04-22 RX ADMIN — OXYCODONE HYDROCHLORIDE 10 MG: 10 TABLET ORAL at 11:09

## 2024-04-22 RX ADMIN — AMPICILLIN AND SULBACTAM 3 G: 1; 2 INJECTION, POWDER, FOR SOLUTION INTRAMUSCULAR; INTRAVENOUS at 23:22

## 2024-04-22 ASSESSMENT — ENCOUNTER SYMPTOMS
DEPRESSION: 0
DIZZINESS: 0
COUGH: 0
WEAKNESS: 0
NERVOUS/ANXIOUS: 0
FEVER: 0
CHILLS: 0
SENSORY CHANGE: 0
HEARTBURN: 0
HEADACHES: 0
INSOMNIA: 0
SHORTNESS OF BREATH: 0
PHOTOPHOBIA: 0
CONSTIPATION: 0
SPEECH CHANGE: 0
MYALGIAS: 0
ABDOMINAL PAIN: 0
DIARRHEA: 0
BLURRED VISION: 0
CLAUDICATION: 0
VOMITING: 0

## 2024-04-22 ASSESSMENT — PAIN DESCRIPTION - PAIN TYPE
TYPE: ACUTE PAIN

## 2024-04-22 NOTE — PROGRESS NOTES
Telemetry Shift Summary    Rhythm SR  HR Range 76-93  Ectopy rPVC  Measurements 0.18/0.10/0.38      Normal Values  Rhythm SR  HR Range:   Measurements: 0.12-0.20/0.06-0.10/0.30-0.52

## 2024-04-22 NOTE — PROGRESS NOTES
Telemetry Shift Summary    Rhythm Sinus Rhythm  HR Range 81-91  Ectopy none  Measurements .18/.10/.36        Normal Values  Rhythm SR  HR Range    Measurements 0.12-0.20 / 0.06-0.10  / 0.30-0.52

## 2024-04-22 NOTE — PROGRESS NOTES
Progress Note    Author:  Diana Calix MD    Date & Time:   4/22/2024   12:00 PM          Patient ID:             Name:             Oneal Tillman     YOB: 1970  Age:                 53 y.o.  male   MRN:               [id number]    ________________________________________________________________________  Acute Care Surgery         Interval Events:  MRI reviewed: patient has very thin, 2mm, laying fluid collection in the subcutaneous tissue the length of his femur.  Patient currently eating, says he undergoing needle aspiration later today       Exam:       Vitals:    04/22/24 1127   BP: 111/61   Pulse: 79   Resp: 18   Temp: 36.6 °C (97.9 °F)   SpO2: 96%     SpO2  Min: 90 %  Max: 98 %  O2 (LPM)  Min: 0  Max: 0  Temp  Min: 36.2 °C (97.2 °F)  Max: 37.6 °C (99.7 °F)    Intake/Output Summary (Last 24 hours) at 4/22/2024 1200  Last data filed at 4/22/2024 1141  Gross per 24 hour   Intake 240 ml   Output 1850 ml   Net -1610 ml     Output by Drain (mL) 04/20/24 0700 - 04/20/24 1859 04/20/24 1900 - 04/21/24 0659 04/21/24 0700 - 04/21/24 1859 04/21/24 1900 - 04/22/24 0659 04/22/24 0700 - 04/22/24 1200   Patient has no LDAs of requested type attached.       DIET ORDERS (From admission to next 24h)       Start     Ordered    04/18/24 1248  Diet Order Diet: Regular  ALL MEALS        Question:  Diet:  Answer:  Regular    04/18/24 1248                            Physical Exam  Nursing note reviewed.   Constitutional:       Appearance: Normal appearance. Alert, oriented x 4.NAD    HENT:      Head: Normocephalic and atraumatic.      Mouth/Throat:      Mouth: Mucous membranes are moist.   Eyes:      Pupils: Pupils are equal, round, and reactive to light.      No scleral Icterus present  Cardiovascular:      Rate and Rhythm: Normal rate and regular rhythm. Extremities warm, well perfused no edema.   Pulmonary:      Effort: Pulmonary effort is normal.      Breath sounds: Normal breath sounds. No wheezes or  stridor  Abdominal:       LLE.  Irregular Blanching erythema and swelling left lateral hip to knee  to palpation, no crepitus palpated.  No fluctuance or induration        Recent Labs     04/20/24 0043 04/21/24 0606 04/22/24 0121   SODIUM 134* 133* 134*   POTASSIUM 4.1 4.4 4.2   CHLORIDE 100 101 101   CO2 24 23 22   BUN 14 12 14   CREATININE 0.77 0.55 0.66   CALCIUM 7.7* 7.5* 7.4*       Recent Labs     04/20/24 0043 04/21/24 0606 04/22/24 0121   ALTSGPT 31  --   --    ASTSGOT 35  --   --    ALKPHOSPHAT 229*  --   --    TBILIRUBIN 0.2  --   --    GLUCOSE 133* 109* 169*       Recent Labs     04/20/24 0043 04/21/24 0606 04/22/24 0121   RBC 3.91* 4.03* 4.11*   HEMOGLOBIN 11.8* 12.0* 12.2*   HEMATOCRIT 35.5* 36.8* 38.3*   PLATELETCT 329 392 497*       Recent Labs     04/20/24 0043 04/21/24 0606 04/22/24  0121   WBC 38.2* 33.4* 28.1*   NEUTSPOLYS 59.00 70.00  --    LYMPHOCYTES 6.00* 9.00*  --    MONOCYTES 2.00 6.00  --    EOSINOPHILS 1.00 2.00  --    BASOPHILS 0.00 0.00  --    ASTSGOT 35  --   --    ALTSGPT 31  --   --    ALKPHOSPHAT 229*  --   --    TBILIRUBIN 0.2  --   --          ________________________________________________________________________      Patient Active Problem List   Diagnosis    Septic shock (HCC)    Sepsis (HCC)    Cellulitis of left lower extremity    Hyperglycemia    Tobacco dependence    Acute kidney injury (HCC)    Dehydration    Hyponatremia    Metabolic acidosis    Shock (HCC)    Bacteremia due to Streptococcus    Hypophosphatemia    Hypokalemia       Acute Issues/Plan:   LLE extremity cellulitis.  Improved with IV abx MRI shows layering fluid collection, reactive fluid vs evolving abscess.  Location of fluid collection would be difficult on open exploration due to the fact that it is very thin.  Agree with plan for aspiration/cultures.  IV abx per ID  Surgery will follow

## 2024-04-22 NOTE — CARE PLAN
The patient is Stable - Low risk of patient condition declining or worsening    Shift Goals  Clinical Goals: Pain management and IV antibx  Patient Goals: Pain management  Family Goals: AHSAN    Progress made toward(s) clinical / shift goals:    Problem: Pain - Standard  Goal: Alleviation of pain or a reduction in pain to the patient’s comfort goal  Outcome: Progressing   Patient pain is being managed via pharmacologic and nonpharmacologic interventions. See flow sheets and MAR.   Problem: Knowledge Deficit - Standard  Goal: Patient and family/care givers will demonstrate understanding of plan of care, disease process/condition, diagnostic tests and medications  Outcome: Progressing   Patient updated on POC, patient verbalized an understanding. Patient educated on medications being administered. All questions answered at this time.     Patient is not progressing towards the following goals:

## 2024-04-22 NOTE — PROGRESS NOTES
Telemetry Shift Summary     Rhythm: SR  HR: 86-96  Ectopy: r pvc    Measurements: 0.16/0.08/0.34    Normal Values  Rhythm: SR  HR:   Measurements: 0.12-0.20/0.08-0.10/0.30-0.52

## 2024-04-22 NOTE — PROGRESS NOTES
Hospital Medicine Daily Progress Note    Date of Service  4/22/2024    Chief Complaint  Oneal Tillman is a 53 y.o. male admitted 4/13/2024 with left leg pain and erythema    Hospital Course  Patient is a 53-year-old male with diabetes and tobacco dependence who presented with leg swelling of the left leg for 1 week and body aches.  Questionable spider bite on the left leg after he changed his pants and then 2 days later started having worsening pain redness and swelling.  Also noticed chills palpitations weakness.  Tachycardic with a heart rate of 104 and low blood pressure.  His initial lactic acid was 4.3 and white count was 15.  Patient was initially admitted and given fluid resuscitation and started on IV antibiotics.  Blood cultures came back positive for group B strep and infectious disease recommended in addition to continuation of Zyvox to add pen G.  He is having worsening left leg erythema and ascending rash with blistering lesions.  Repeat CT left leg pending.    Interval Problem Update  4/16 patient states he is having significant pain in the left leg, thinks he feels his thigh is softer but he still having excruciating pain.  Denies any fevers overnight but is diaphoretic at times.  Patient now with bulla in the left groin and significant scrotal edema.  IV fluids discontinued as his acute kidney injury has resolved, single dose IV Lasix ordered, repeat CT left lower extremity per infectious disease recommendations to make sure there is not significant progression or gas producing lesion.  4/17 patient seen this morning and then later discussed again with infectious disease this afternoon.  On my evaluation his left leg was less edematous and less erythematous.  Throughout the afternoon however ID has noticed that the erythema and slight edema has now affected his left upper thigh all the way to the left lower abdomen.  WBC count is slightly better.  Patient has been afebrile.  The CT leg showed a very  small fluid collection which likely is not amenable at this time for surgical intervention or aspiration of the area.  Again low threshold to reconsult surgery if there is any worsening of the patient's status however we will continue with antibiotics and will give another dose of diuresis.  4/18 patient doing about the same from a physical standpoint erythema remains up to the left flank, patient overall is feeling better but given his elevated WBC, ID recommended general surgery reconsultation.  General surgery does not feel that intervention from their standpoint is appropriate at this time.  If there is an attainable fluid collection aspiration recommended.  WBC count up to 31.5 today.  Patient is very frustrated with his current plan of care given the possibility of upcoming surgery and the fact that he was labeled to be diabetic because of elevated blood glucose.  As surgery is not happening today regular diet has been reinitiated.  4/19 patient states overall he is feeling better.  The erythema on his left flank is the same if not slightly better and less superior.  The blister in the left groin has ruptured, there is firmness to the left medial thigh and ultrasound will be done over this area to see if there is any evidence of abscess that could be aspirated.  4/20 patient with visual improvement to his flank erythema and edema.  It has regressed past 3 days worth of demarcation.  The majority of his erythema is the left lateral thigh.  Multiple MRIs pending to help image the area to make sure that infection is improving.  WBC count continues to elevate but patient clinically looks better today.  4/21 WBC count is actually regressing finally.  Down to 33.4.  The erythema seems to have localized to the left lateral thigh and most superior aspect is the left inguinal fold.  He does have some fluctuance left lateral thigh however is not tender.  Awaiting MRI of the left leg, abdomen pelvis with contrast.  4/22  patient feeling good.  MRI was completed yesterday which was possible for a 1.3 cm x 40 cm fluid collection on report.  This was discussed with interventional radiology Dr. Terrell who evaluated the images and also feels that this is not a delineated fluid collection but more so fluid in the tissues itself.  He does not feel that he would be able to reach this with a needle for aspiration.  I then took the ultrasound to bedside and evaluated the patient's fluid collection myself and agree with the radiologist that there is not a organized area to place the needle for aspiration.  WBC count continues to drop and is down to 28.  Patient remains afebrile and though he is still having weeping at the posterior aspect of his left knee is showing overall significant improvement.  MRI of the abdomen pelvis have been canceled as the erythema in this area has resolved.    I have discussed this patient's plan of care and discharge plan at IDT rounds today with Case Management, Nursing, Nursing leadership, and other members of the IDT team.    Consultants/Specialty  infectious disease  General surgery    Code Status  Full Code    Disposition  The patient is not medically cleared for discharge to home or a post-acute facility.  Anticipate discharge to: home with close outpatient follow-up    I have placed the appropriate orders for post-discharge needs.    Review of Systems  Review of Systems   Constitutional:  Negative for chills and fever.   HENT:  Negative for congestion.    Eyes:  Negative for blurred vision and photophobia.   Respiratory:  Negative for cough and shortness of breath.    Cardiovascular:  Positive for leg swelling (Left leg and scrotal swelling - improving). Negative for chest pain and claudication.   Gastrointestinal:  Negative for abdominal pain, constipation, diarrhea, heartburn and vomiting.   Genitourinary:  Negative for dysuria and hematuria.   Musculoskeletal:  Negative for joint pain and myalgias.   Skin:   Positive for rash. Negative for itching.   Neurological:  Negative for dizziness, sensory change, speech change, weakness and headaches.   Psychiatric/Behavioral:  Negative for depression. The patient is not nervous/anxious and does not have insomnia.         Physical Exam  Temp:  [36.6 °C (97.9 °F)-37.1 °C (98.7 °F)] 36.6 °C (97.9 °F)  Pulse:  [73-95] 79  Resp:  [18] 18  BP: (105-124)/(50-89) 111/61  SpO2:  [90 %-97 %] 96 %    Physical Exam  Vitals and nursing note reviewed.   Constitutional:       General: He is not in acute distress.     Appearance: Normal appearance.   HENT:      Head: Normocephalic and atraumatic.   Eyes:      General: No scleral icterus.     Extraocular Movements: Extraocular movements intact.   Cardiovascular:      Rate and Rhythm: Normal rate and regular rhythm.      Pulses: Normal pulses.      Heart sounds: Normal heart sounds. No murmur heard.  Pulmonary:      Effort: Pulmonary effort is normal. No respiratory distress.      Breath sounds: Normal breath sounds. No wheezing, rhonchi or rales.   Abdominal:      General: Abdomen is flat. Bowel sounds are normal. There is no distension.      Palpations: Abdomen is soft.      Tenderness: There is no rebound.      Comments: Erythema no longer present left lower abdomen or flank, ends at the inguinal fold     Genitourinary:     Comments: Scrotal edema improved.    Musculoskeletal:         General: No swelling or tenderness.      Cervical back: Normal range of motion and neck supple.      Left lower leg: Edema (slightly improved) present.   Lymphadenopathy:      Cervical: No cervical adenopathy.   Skin:     General: Skin is warm.      Coloration: Skin is not jaundiced.      Findings: Bruising (bruising left popliteal area improving, less edmatious.) present. No erythema.      Comments: Blisters resolved, have ruptured   Neurological:      General: No focal deficit present.      Mental Status: He is alert and oriented to person, place, and time.  Mental status is at baseline.      Cranial Nerves: No cranial nerve deficit.   Psychiatric:         Mood and Affect: Mood normal.         Behavior: Behavior normal.         Fluids    Intake/Output Summary (Last 24 hours) at 4/22/2024 1445  Last data filed at 4/22/2024 1141  Gross per 24 hour   Intake --   Output 1450 ml   Net -1450 ml       Laboratory  Recent Labs     04/20/24  0043 04/21/24  0606 04/22/24  0121   WBC 38.2* 33.4* 28.1*   RBC 3.91* 4.03* 4.11*   HEMOGLOBIN 11.8* 12.0* 12.2*   HEMATOCRIT 35.5* 36.8* 38.3*   MCV 90.8 91.3 93.2   MCH 30.2 29.8 29.7   MCHC 33.2 32.6 31.9*   RDW 46.8 46.4 47.2   PLATELETCT 329 392 497*   MPV 9.8 9.3 9.2     Recent Labs     04/20/24 0043 04/21/24  0606 04/22/24  0121   SODIUM 134* 133* 134*   POTASSIUM 4.1 4.4 4.2   CHLORIDE 100 101 101   CO2 24 23 22   GLUCOSE 133* 109* 169*   BUN 14 12 14   CREATININE 0.77 0.55 0.66   CALCIUM 7.7* 7.5* 7.4*                   Imaging  MR-FEMUR-WITH & W/O LEFT   Final Result      1.  Left lateral/posterior site cellulitis      2.  Rim-enhancing fluid collection consistent with abscess within the subcutaneous tissues of the left thigh extending from the 1:00-2:00 position to the 7:00 position, measuring 12 mm in thickness, 16.7 cm anterior to posterior, and craniocaudal extent    extending from the level of the left greater trochanter to the distal thigh measuring approximately 40 cm      3.   Nonspecific edema within the hamstring musculature      4.  Negative for osteomyelitis      US-ABDOMEN LTD (SOFT TISSUE)   Final Result         1. No discrete fluid collection identified.      US-EXTREMITY NON VASCULAR UNILATERAL LEFT   Final Result      Edema without a discrete fluid collection.      CT-EXTREMITY, LOWER WITH LEFT   Final Result      1.  Subcutaneous inflammatory changes about the left lower leg and thigh suspicious for cellulitis.      2.  Curvilinear fluid attenuation adjacent to the musculature of the lateral aspect of the left  thigh measuring 1.3 cm in greatest diameter suspicious for subcutaneous seroma less likely early abscess formation.      EC-ECHOCARDIOGRAM COMPLETE W/O CONT   Final Result      US-EXTREMITY VENOUS LOWER UNILAT LEFT   Final Result         No evidence of acute deep venous thrombosis in the visualized venous segments of the left lower extremity.         DX-CHEST-FOR LINE PLACEMENT Perform procedure in: Patient's Room   Final Result      1.  Interval insertion of a central venous catheter which terminates with the tip projecting over the expected region of the mid to distal superior vena cava.   2.  No acute cardiopulmonary.      CT-EXTREMITY, LOWER W/O LEFT   Final Result      1.  Left lower extremity edema versus cellulitis. No drainable fluid collections on this noncontrast study.   2.  Enlarged left external iliac and inguinal lymph nodes. They may be reactive to the left lower extremity infection, however, neoplastic nodes are difficult to rule out. Close clinical follow-up is needed.      DX-CHEST-PORTABLE (1 VIEW)   Final Result      No acute cardiopulmonary abnormality.              Assessment/Plan  * Septic shock (HCC)- (present on admission)  Assessment & Plan  This is Sepsis Present on admission  SIRS criteria identified on my evaluation include: Tachycardia, with heart rate greater than 90 BPM, Leukocytosis, with WBC greater than 12,000, and Bandemia, greater than 10% bands  Clinical indicators of end organ dysfunction include Hypotension with systolic blood pressure less than 90 or MAP less than 65, Hypotension with decrease in systolic blood pressure of more than 40mmHg, and Lactic Acid greater than 2  Source is cellulitis  Sepsis protocol initiated  Crystalloid Fluid Administration: Fluid resuscitation ordered per standard protocol - 30 mL/kg per current or ideal body weight  IV antibiotics as appropriate for source of sepsis  Reassessment: I have reassessed the patient's hemodynamic status     WBC  28.1  - Discussed again with infectious disease, DC pen G, start Unasyn, DC linezolid  --  discussed with Dr. Samuel, no need for surgical involvement at this time if there is concern of an abscess then recommends needle aspiration.  --Ultrasound of the left groin, upper thigh does not show obvious abscess that could be aspirated for culture,  -- MRI of the left thigh to be done showing very small fluid collection 1.3 cm in largest diameter but throughout the entire upper leg of 40 cm.  I discussed with interventional radiology Dr. Terrell who reviewed the images and does not feel that there is a attainable area to aspirate.  I took the ultrasound to bedside and evaluated myself to see if there was any where I could put a needle into aspirate for cultures.  There is no discernible fluid collection that could be reached with a needle that would be of benefit.  Discussed with Dr. Calix who is also following along.  Patient is agreeable to current interventions with antibiotics.  If no discernible fluid collection is seen on ultrasound on Wednesday then I would recommend the patient be transition to Augmentin and finish the course of antibiotics as recommended by Dr. Kendall    Hypokalemia- (present on admission)  Assessment & Plan  K 3.4, replacing  PO    Hypophosphatemia- (present on admission)  Assessment & Plan  Phos 1.6 replacing with IV K-Phos    Bacteremia due to Streptococcus- (present on admission)  Assessment & Plan  4/13/24 blood culture positive for strep group A  4/15  -cultures negative x 2  Unless there is another abscess to aspirate, treating group A strep, continue with Unasyn  - I consulted ID  - I ordered echocardiogram -which did not show any signs of vegetations  -Repeat CT left lower extremity shows small questionable fluid collection but more consistent with cellulitis.  Erythema left side abdomen improving and leg and groin look much better.  Appreciate general surgery reconsultation  On Zosyn to  Augmentin for total of 14-day treatment once appropriate, appreciate ID and surgery recommendations.    Metabolic acidosis- (present on admission)  Assessment & Plan  Likely due to reduced intravascular volume and secondary to sepsis.  Continue IV fluids  Bicarb 23    Hyponatremia- (present on admission)  Assessment & Plan  Resolved    Dehydration- (present on admission)  Assessment & Plan  Resolved, IV diuresis prn      Acute kidney injury (HCC)- (present on admission)  Assessment & Plan  Resolved, now with volume overload, continue as needed Lasix    Tobacco dependence- (present on admission)  Assessment & Plan  Counseled on admission    Hyperglycemia- (present on admission)  Assessment & Plan  With marked hyperglycemia  Continue SSI, Accu-Cheks and hypoglycemia protocol    Cellulitis of left lower extremity- (present on admission)  Assessment & Plan  - I discussed with ID pharmacy, WBC started to trend down once pen G was switched over to Unasyn  Will complete course of treatment with Augmentin if no discernible fluid collection seen on repeat ultrasound on Wednesday  Discussed with Dr. Kendall, appreciate eval, appreciate surgical evaluation as well  Significant elevation in WBC count today, afebrile, patient states overall he is feeling better         VTE prophylaxis:   SCDs/TEDs      I have performed a physical exam and reviewed and updated ROS and Plan today (4/22/2024). In review of yesterday's note (4/21/2024), there are no changes except as documented above.

## 2024-04-22 NOTE — PROGRESS NOTES
Assumed care of patient at bedside report from day shift RN. Updated on POC. Patient currently A & O x 4, on room air O2 96%, up in bed, with complaints of acute pain. Patient medicated per MAR. Assessment completed. Call light within reach. Whiteboard updated. Fall precautions in place. Bed locked and in lowest position. All questions answered. No other needs indicated at this time.

## 2024-04-22 NOTE — CARE PLAN
The patient is Stable - Low risk of patient condition declining or worsening    Shift Goals  Clinical Goals: pain management, safety, abx, MRI  Patient Goals: pain management, go home  Family Goals: isidoro    Progress made toward(s) clinical / shift goals:  Pt a/ox4, to undergo aspiration of abscess found on MRI today at bedside. Pt tolerating abx regimen, bed alarm on, calling appropriately. Good urine output on this shift, see flowsheets     Patient is not progressing towards the following goals: Pt continues to report severe pain in LLE, requiring frequent medication.        Problem: Pain - Standard  Goal: Alleviation of pain or a reduction in pain to the patient’s comfort goal  Outcome: Not Progressing     Problem: Fall Risk  Goal: Patient will remain free from falls  Outcome: Not Progressing

## 2024-04-23 LAB
ACUTE LEUKEMIA MARKERS SPEC-IMP: NORMAL
ANION GAP SERPL CALC-SCNC: 11 MMOL/L (ref 7–16)
BUN SERPL-MCNC: 14 MG/DL (ref 8–22)
CALCIUM SERPL-MCNC: 7.7 MG/DL (ref 8.4–10.2)
CHLORIDE SERPL-SCNC: 103 MMOL/L (ref 96–112)
CO2 SERPL-SCNC: 22 MMOL/L (ref 20–33)
CREAT SERPL-MCNC: 0.75 MG/DL (ref 0.5–1.4)
ERYTHROCYTE [DISTWIDTH] IN BLOOD BY AUTOMATED COUNT: 46.7 FL (ref 35.9–50)
EVENTS COUNTED SPEC: 26 MARKERS
GFR SERPLBLD CREATININE-BSD FMLA CKD-EPI: 107 ML/MIN/1.73 M 2
GLUCOSE SERPL-MCNC: 163 MG/DL (ref 65–99)
HCT VFR BLD AUTO: 34.9 % (ref 42–52)
HGB BLD-MCNC: 11.5 G/DL (ref 14–18)
MCH RBC QN AUTO: 30.1 PG (ref 27–33)
MCHC RBC AUTO-ENTMCNC: 33 G/DL (ref 32.3–36.5)
MCV RBC AUTO: 91.4 FL (ref 81.4–97.8)
PLATELET # BLD AUTO: 545 K/UL (ref 164–446)
PMV BLD AUTO: 8.9 FL (ref 9–12.9)
POTASSIUM SERPL-SCNC: 4.1 MMOL/L (ref 3.6–5.5)
RBC # BLD AUTO: 3.82 M/UL (ref 4.7–6.1)
SODIUM SERPL-SCNC: 136 MMOL/L (ref 135–145)
SOURCE 9121: NORMAL
WBC # BLD AUTO: 22.1 K/UL (ref 4.8–10.8)

## 2024-04-23 PROCEDURE — 700105 HCHG RX REV CODE 258: Performed by: INTERNAL MEDICINE

## 2024-04-23 PROCEDURE — 700102 HCHG RX REV CODE 250 W/ 637 OVERRIDE(OP): Performed by: HOSPITALIST

## 2024-04-23 PROCEDURE — 80048 BASIC METABOLIC PNL TOTAL CA: CPT

## 2024-04-23 PROCEDURE — 700111 HCHG RX REV CODE 636 W/ 250 OVERRIDE (IP): Performed by: HOSPITALIST

## 2024-04-23 PROCEDURE — 700111 HCHG RX REV CODE 636 W/ 250 OVERRIDE (IP): Mod: JZ | Performed by: INTERNAL MEDICINE

## 2024-04-23 PROCEDURE — 99233 SBSQ HOSP IP/OBS HIGH 50: CPT | Performed by: INTERNAL MEDICINE

## 2024-04-23 PROCEDURE — A9270 NON-COVERED ITEM OR SERVICE: HCPCS | Performed by: HOSPITALIST

## 2024-04-23 PROCEDURE — 85027 COMPLETE CBC AUTOMATED: CPT

## 2024-04-23 PROCEDURE — 770020 HCHG ROOM/CARE - TELE (206)

## 2024-04-23 PROCEDURE — 94760 N-INVAS EAR/PLS OXIMETRY 1: CPT

## 2024-04-23 RX ADMIN — HEPARIN SODIUM 5000 UNITS: 5000 INJECTION, SOLUTION INTRAVENOUS; SUBCUTANEOUS at 21:19

## 2024-04-23 RX ADMIN — AMPICILLIN AND SULBACTAM 3 G: 1; 2 INJECTION, POWDER, FOR SOLUTION INTRAMUSCULAR; INTRAVENOUS at 05:04

## 2024-04-23 RX ADMIN — AMPICILLIN AND SULBACTAM 3 G: 1; 2 INJECTION, POWDER, FOR SOLUTION INTRAMUSCULAR; INTRAVENOUS at 23:43

## 2024-04-23 RX ADMIN — OXYCODONE HYDROCHLORIDE 10 MG: 10 TABLET ORAL at 20:32

## 2024-04-23 RX ADMIN — OXYCODONE HYDROCHLORIDE 10 MG: 10 TABLET ORAL at 11:29

## 2024-04-23 RX ADMIN — OXYCODONE HYDROCHLORIDE 10 MG: 10 TABLET ORAL at 14:29

## 2024-04-23 RX ADMIN — HEPARIN SODIUM 5000 UNITS: 5000 INJECTION, SOLUTION INTRAVENOUS; SUBCUTANEOUS at 14:29

## 2024-04-23 RX ADMIN — AMPICILLIN AND SULBACTAM 3 G: 1; 2 INJECTION, POWDER, FOR SOLUTION INTRAMUSCULAR; INTRAVENOUS at 17:26

## 2024-04-23 RX ADMIN — OXYCODONE HYDROCHLORIDE 10 MG: 10 TABLET ORAL at 17:25

## 2024-04-23 RX ADMIN — NICOTINE 14 MG: 14 PATCH, EXTENDED RELEASE TRANSDERMAL at 05:04

## 2024-04-23 RX ADMIN — HEPARIN SODIUM 5000 UNITS: 5000 INJECTION, SOLUTION INTRAVENOUS; SUBCUTANEOUS at 05:05

## 2024-04-23 RX ADMIN — OXYCODONE HYDROCHLORIDE 10 MG: 10 TABLET ORAL at 03:13

## 2024-04-23 RX ADMIN — OXYCODONE HYDROCHLORIDE 10 MG: 10 TABLET ORAL at 00:09

## 2024-04-23 RX ADMIN — OXYCODONE HYDROCHLORIDE 10 MG: 10 TABLET ORAL at 07:57

## 2024-04-23 RX ADMIN — OXYCODONE HYDROCHLORIDE 10 MG: 10 TABLET ORAL at 23:41

## 2024-04-23 RX ADMIN — AMPICILLIN AND SULBACTAM 3 G: 1; 2 INJECTION, POWDER, FOR SOLUTION INTRAMUSCULAR; INTRAVENOUS at 11:29

## 2024-04-23 ASSESSMENT — PAIN DESCRIPTION - PAIN TYPE
TYPE: ACUTE PAIN

## 2024-04-23 ASSESSMENT — ENCOUNTER SYMPTOMS
VOMITING: 0
DIARRHEA: 0
COUGH: 0
SHORTNESS OF BREATH: 0
CONSTIPATION: 0
NAUSEA: 0
DIZZINESS: 0
ABDOMINAL PAIN: 0
FEVER: 0
CHILLS: 0
HEADACHES: 0
PALPITATIONS: 0
BACK PAIN: 0

## 2024-04-23 ASSESSMENT — FIBROSIS 4 INDEX: FIB4 SCORE: 0.62

## 2024-04-23 NOTE — CARE PLAN
The patient is Stable - Low risk of patient condition declining or worsening    Shift Goals  Clinical Goals: pain management, ABX  Patient Goals: rest and pain management  Family Goals: AHSAN    Progress made toward(s) clinical / shift goals:    Problem: Pain - Standard  Goal: Alleviation of pain or a reduction in pain to the patient’s comfort goal  Outcome: Progressing  Note: Pt reports pain in left lower extremity. Medicated per MAR. Pt educated on use of call light for pain management needs.      Problem: Knowledge Deficit - Standard  Goal: Patient and family/care givers will demonstrate understanding of plan of care, disease process/condition, diagnostic tests and medications  Outcome: Progressing  Note: Pt updated on POC, agreeable. Continuing to receive IV ABX, unasyn.      Problem: Fall Risk  Goal: Patient will remain free from falls  Outcome: Progressing

## 2024-04-23 NOTE — PROGRESS NOTES
Telemetry Shift Summary    Rhythm Sinus Rhythm  HR Range 77-81  Ectopy none  Measurements .20/.08/.34        Normal Values  Rhythm SR  HR Range    Measurements 0.12-0.20 / 0.06-0.10  / 0.30-0.52

## 2024-04-23 NOTE — CARE PLAN
The patient is Stable - Low risk of patient condition declining or worsening    Shift Goals  Clinical Goals: pain management, abx, safety, ambulate  Patient Goals: understand POC, pain management  Family Goals: isidoro    Progress made toward(s) clinical / shift goals:  Pt stable on this shift, a/ox4, good urine output charted accordingly. Pt tolerating abx regimen, pain managed with current PRNs. MD at bedside to provide clarification to pt questions.     Patient is not progressing towards the following goals: Pt reports severe pain at times in LLE, well managed with q3 oxycodone 10. No alternatives needed at this time. This wound draining a moderate amount, linens changed to prevent skin breakdown. Pt educated about ambulation, wishes to rest at this time, encouraged to mobilize so as to prevent deterioration.       Problem: Knowledge Deficit - Standard  Goal: Patient and family/care givers will demonstrate understanding of plan of care, disease process/condition, diagnostic tests and medications  Outcome: Progressing     Problem: Urinary - Renal Perfusion  Goal: Ability to achieve and maintain adequate renal perfusion and functioning will improve  Outcome: Progressing     Problem: Skin Integrity  Goal: Skin integrity is maintained or improved  Outcome: Progressing     Problem: Pain - Standard  Goal: Alleviation of pain or a reduction in pain to the patient’s comfort goal  Outcome: Not Progressing     Problem: Fall Risk  Goal: Patient will remain free from falls  Outcome: Not Progressing

## 2024-04-23 NOTE — PROGRESS NOTES
Telemetry summary    Rhythm: SR  Rate: 77-89  Ectopy: rPVC  Measurements: 0.16/0.08/0.34    Normal Values  Rhythm: SR  HR Range:   Measurement: 0.12-0.2/0.06-0.10/0.30-0.52

## 2024-04-24 LAB
ALBUMIN SERPL BCP-MCNC: 2.6 G/DL (ref 3.2–4.9)
BUN SERPL-MCNC: 13 MG/DL (ref 8–22)
CALCIUM ALBUM COR SERPL-MCNC: 9.1 MG/DL (ref 8.5–10.5)
CALCIUM SERPL-MCNC: 8 MG/DL (ref 8.4–10.2)
CHLORIDE SERPL-SCNC: 103 MMOL/L (ref 96–112)
CO2 SERPL-SCNC: 23 MMOL/L (ref 20–33)
CREAT SERPL-MCNC: 0.61 MG/DL (ref 0.5–1.4)
ERYTHROCYTE [DISTWIDTH] IN BLOOD BY AUTOMATED COUNT: 46.4 FL (ref 35.9–50)
GFR SERPLBLD CREATININE-BSD FMLA CKD-EPI: 114 ML/MIN/1.73 M 2
GLUCOSE SERPL-MCNC: 101 MG/DL (ref 65–99)
HCT VFR BLD AUTO: 35 % (ref 42–52)
HGB BLD-MCNC: 11.5 G/DL (ref 14–18)
MAGNESIUM SERPL-MCNC: 1.9 MG/DL (ref 1.5–2.5)
MCH RBC QN AUTO: 29.9 PG (ref 27–33)
MCHC RBC AUTO-ENTMCNC: 32.9 G/DL (ref 32.3–36.5)
MCV RBC AUTO: 91.1 FL (ref 81.4–97.8)
PHOSPHATE SERPL-MCNC: 3.5 MG/DL (ref 2.5–4.5)
PLATELET # BLD AUTO: 629 K/UL (ref 164–446)
PMV BLD AUTO: 8.6 FL (ref 9–12.9)
POTASSIUM SERPL-SCNC: 4.4 MMOL/L (ref 3.6–5.5)
PROCALCITONIN SERPL-MCNC: 0.08 NG/ML
RBC # BLD AUTO: 3.84 M/UL (ref 4.7–6.1)
SODIUM SERPL-SCNC: 134 MMOL/L (ref 135–145)
WBC # BLD AUTO: 17.9 K/UL (ref 4.8–10.8)

## 2024-04-24 PROCEDURE — 83735 ASSAY OF MAGNESIUM: CPT

## 2024-04-24 PROCEDURE — 700111 HCHG RX REV CODE 636 W/ 250 OVERRIDE (IP): Mod: JZ | Performed by: INTERNAL MEDICINE

## 2024-04-24 PROCEDURE — 99233 SBSQ HOSP IP/OBS HIGH 50: CPT | Performed by: INTERNAL MEDICINE

## 2024-04-24 PROCEDURE — 700111 HCHG RX REV CODE 636 W/ 250 OVERRIDE (IP): Performed by: HOSPITALIST

## 2024-04-24 PROCEDURE — 85027 COMPLETE CBC AUTOMATED: CPT

## 2024-04-24 PROCEDURE — 770020 HCHG ROOM/CARE - TELE (206)

## 2024-04-24 PROCEDURE — 700102 HCHG RX REV CODE 250 W/ 637 OVERRIDE(OP): Performed by: HOSPITALIST

## 2024-04-24 PROCEDURE — 84145 PROCALCITONIN (PCT): CPT

## 2024-04-24 PROCEDURE — 80069 RENAL FUNCTION PANEL: CPT

## 2024-04-24 PROCEDURE — 700105 HCHG RX REV CODE 258: Performed by: INTERNAL MEDICINE

## 2024-04-24 PROCEDURE — A9270 NON-COVERED ITEM OR SERVICE: HCPCS | Performed by: HOSPITALIST

## 2024-04-24 RX ADMIN — OXYCODONE HYDROCHLORIDE 10 MG: 10 TABLET ORAL at 08:34

## 2024-04-24 RX ADMIN — OXYCODONE HYDROCHLORIDE 10 MG: 10 TABLET ORAL at 20:32

## 2024-04-24 RX ADMIN — AMPICILLIN AND SULBACTAM 3 G: 1; 2 INJECTION, POWDER, FOR SOLUTION INTRAMUSCULAR; INTRAVENOUS at 05:04

## 2024-04-24 RX ADMIN — HEPARIN SODIUM 5000 UNITS: 5000 INJECTION, SOLUTION INTRAVENOUS; SUBCUTANEOUS at 14:33

## 2024-04-24 RX ADMIN — AMPICILLIN AND SULBACTAM 3 G: 1; 2 INJECTION, POWDER, FOR SOLUTION INTRAMUSCULAR; INTRAVENOUS at 17:17

## 2024-04-24 RX ADMIN — OXYCODONE HYDROCHLORIDE 10 MG: 10 TABLET ORAL at 02:40

## 2024-04-24 RX ADMIN — AMPICILLIN AND SULBACTAM 3 G: 1; 2 INJECTION, POWDER, FOR SOLUTION INTRAMUSCULAR; INTRAVENOUS at 23:31

## 2024-04-24 RX ADMIN — HEPARIN SODIUM 5000 UNITS: 5000 INJECTION, SOLUTION INTRAVENOUS; SUBCUTANEOUS at 05:04

## 2024-04-24 RX ADMIN — OXYCODONE HYDROCHLORIDE 10 MG: 10 TABLET ORAL at 12:55

## 2024-04-24 RX ADMIN — OXYCODONE HYDROCHLORIDE 10 MG: 10 TABLET ORAL at 05:51

## 2024-04-24 RX ADMIN — NICOTINE 14 MG: 14 PATCH, EXTENDED RELEASE TRANSDERMAL at 05:02

## 2024-04-24 RX ADMIN — OXYCODONE HYDROCHLORIDE 10 MG: 10 TABLET ORAL at 17:16

## 2024-04-24 RX ADMIN — HEPARIN SODIUM 5000 UNITS: 5000 INJECTION, SOLUTION INTRAVENOUS; SUBCUTANEOUS at 23:39

## 2024-04-24 RX ADMIN — OXYCODONE HYDROCHLORIDE 10 MG: 10 TABLET ORAL at 23:38

## 2024-04-24 RX ADMIN — AMPICILLIN AND SULBACTAM 3 G: 1; 2 INJECTION, POWDER, FOR SOLUTION INTRAMUSCULAR; INTRAVENOUS at 12:57

## 2024-04-24 ASSESSMENT — ENCOUNTER SYMPTOMS
CHILLS: 0
FEVER: 0
NAUSEA: 0
VOMITING: 0
DIARRHEA: 0
ABDOMINAL PAIN: 0
COUGH: 0
DIZZINESS: 0
BACK PAIN: 0
PALPITATIONS: 0
HEADACHES: 0
SHORTNESS OF BREATH: 0
CONSTIPATION: 0

## 2024-04-24 ASSESSMENT — PAIN DESCRIPTION - PAIN TYPE
TYPE: ACUTE PAIN

## 2024-04-24 NOTE — DISCHARGE PLANNING
Met with pt who reports he is independent with ADLs and IADLs. Pt said he lives with his GF. Pt said he does not use any DMEs. Discussed with CM Manager and this is a workman's case so , unable to do Meds to Beds as usually the workman's comp adjustor helps with paying for the medications.     Pt uses [facility] but pt does not use any in particular as he does not have any regular to take at this time. He does not have a PCP so CM LVM for scheduling.     Care Transition Team Assessment    Information Source  Orientation Level: Oriented X4  Information Given By: Patient  Who is responsible for making decisions for patient? : Patient    Readmission Evaluation  Is this a readmission?: No    Elopement Risk  Legal Hold: No  Ambulatory or Self Mobile in Wheelchair: No-Not an Elopement Risk  Disoriented: No  Psychiatric Symptoms: None  History of Wandering: No  Elopement this Admit: No  Vocalizing Wanting to Leave: No  Displays Behaviors, Body Language Wanting to Leave: No-Not at Risk for Elopement  Elopement Risk: Not at Risk for Elopement    Interdisciplinary Discharge Planning  Does Admitting Nurse Feel This Could be a Complex Discharge?: No  Primary Care Physician: No PCP /This is workman's comp  Lives with - Patient's Self Care Capacity: Significant Other  Patient or legal guardian wants to designate a caregiver: No  Support Systems: Family Member(s)  Housing / Facility: 1 Story Apartment / Condo  Do You Take your Prescribed Medications Regularly: Yes  Able to Return to Previous ADL's: Yes  Mobility Issues: No  Prior Services: Home-Independent  Patient Prefers to be Discharged to:: Home  Assistance Needed: No  Durable Medical Equipment: Not Applicable    Discharge Preparedness  What is your plan after discharge?: Home with help  What are your discharge supports?: Spouse (Significant other)  Prior Functional Level: Ambulatory, Drives Self, Independent with Activities of Daily Living, Independent with Medication  Management  Difficulity with ADLs: None  Difficulity with IADLs: Driving    Functional Assesment  Prior Functional Level: Ambulatory, Drives Self, Independent with Activities of Daily Living, Independent with Medication Management    Finances  Financial Barriers to Discharge: No  Prescription Coverage: Yes    Vision / Hearing Impairment  Vision Impairment : Yes  Right Eye Vision: Wears Glasses, Impaired  Left Eye Vision: Impaired, Wears Glasses  Hearing Impairment : No    Values / Beliefs / Concerns  Values / Beliefs Concerns : No    Advance Directive  Advance Directive?: None    Domestic Abuse  Have you ever been the victim of abuse or violence?: No  Physical Abuse or Sexual Abuse: No  Verbal Abuse or Emotional Abuse: No  Possible Abuse/Neglect Reported to:: Not Applicable         Discharge Risks or Barriers  Discharge risks or barriers?: No PCP    Anticipated Discharge Information  Discharge Disposition: Discharged to home/self care (01)

## 2024-04-24 NOTE — CARE PLAN
The patient is Stable - Low risk of patient condition declining or worsening    Shift Goals  Clinical Goals: pain management, iv abx, surgery consult, skin integrity  Patient Goals: go home  Family Goals: isidoro    Progress made toward(s) clinical / shift goals:  Pt a/ox4, alert, good appetite, continues to have sufficient urine output. Surgery to consult today for recommendations. Pt turns self in bed frequently, able to ambulate to bathroom with FWW and stand by assist. Constipation resolved over night with one bowel movement. IV abx regimen in progress.     Patient is not progressing towards the following goals: Pt continues to have leakage from wound at back of knee, linens changed to promote skin integrity and hygiene. Open area irrigated with normal saline and dressed with sterile dry gauze to prevent further infection. Pt requiring constant pharmacological pain management for aching/burning at location of abscess.       Problem: Pain - Standard  Goal: Alleviation of pain or a reduction in pain to the patient’s comfort goal  Outcome: Not Progressing     Problem: Wound/ / Incision Healing  Goal: Patient's wound/surgical incision will decrease in size and heals properly  Outcome: Not Progressing     Problem: Fall Risk  Goal: Patient will remain free from falls  Outcome: Progressing     Problem: Urinary - Renal Perfusion  Goal: Ability to achieve and maintain adequate renal perfusion and functioning will improve  Outcome: Progressing

## 2024-04-24 NOTE — PROGRESS NOTES
Telemetry summary    Rhythm: SR  Rate: 81-95  Ectopy: rPVC  Measurements: 0.18/0.08/0.36    Normal Values  Rhythm: SR  HR Range:   Measurement: 0.12-0.2/0.06-0.10/0.30-0.52

## 2024-04-24 NOTE — PROGRESS NOTES
Progress Note    Author:  Diana Calix MD    Date & Time:   4/24/2024   9:05 AM          Patient ID:             Name:             Oneal Tillman     YOB: 1970  Age:                 53 y.o.  male   MRN:               [id number]    ________________________________________________________________________     Patient reports decreased LLE pain    Exam:       Vitals:    04/24/24 0701   BP: 112/57   Pulse: 93   Resp: 18   Temp: 37.2 °C (98.9 °F)   SpO2: 94%     SpO2  Min: 90 %  Max: 98 %  O2 (LPM)  Min: 0  Max: 0  Temp  Min: 36.2 °C (97.2 °F)  Max: 37.6 °C (99.7 °F)    Intake/Output Summary (Last 24 hours) at 4/24/2024 0905  Last data filed at 4/23/2024 2341  Gross per 24 hour   Intake 540 ml   Output 1550 ml   Net -1010 ml     Output by Drain (mL) 04/22/24 0700 - 04/22/24 1859 04/22/24 1900 - 04/23/24 0659 04/23/24 0700 - 04/23/24 1859 04/23/24 1900 - 04/24/24 0659 04/24/24 0700 - 04/24/24 0905   Patient has no LDAs of requested type attached.       DIET ORDERS (From admission to next 24h)       Start     Ordered    04/18/24 1248  Diet Order Diet: Regular  ALL MEALS        Question:  Diet:  Answer:  Regular    04/18/24 1248                            Physical Exam  LLE  Erythema and swelling has decreased significantly  There is a 10L area of fluctuance posterior thigh, cloudy serous drainage from small adjacent defect.         Recent Labs     04/22/24  0121 04/23/24  0015 04/24/24  0024   SODIUM 134* 136 134*   POTASSIUM 4.2 4.1 4.4   CHLORIDE 101 103 103   CO2 22 22 23   BUN 14 14 13   CREATININE 0.66 0.75 0.61   MAGNESIUM  --   --  1.9   PHOSPHORUS  --   --  3.5   CALCIUM 7.4* 7.7* 8.0*       Recent Labs     04/22/24  0121 04/23/24  0015 04/24/24  0024   GLUCOSE 169* 163* 101*       Recent Labs     04/22/24 0121 04/23/24  0015 04/24/24  0024   RBC 4.11* 3.82* 3.84*   HEMOGLOBIN 12.2* 11.5* 11.5*   HEMATOCRIT 38.3* 34.9* 35.0*   PLATELETCT 497* 545* 629*       Recent Labs     04/22/24 0121  04/23/24  0015 04/24/24  0024   WBC 28.1* 22.1* 17.9*         ________________________________________________________________________      Patient Active Problem List   Diagnosis    Septic shock (HCC)    Sepsis (HCC)    Cellulitis of left lower extremity    Hyperglycemia    Tobacco dependence    Acute kidney injury (HCC)    Dehydration    Hyponatremia    Metabolic acidosis    Shock (HCC)    Bacteremia due to Streptococcus    Hypophosphatemia    Hypokalemia       Acute Issues/Plan:   LLE cellulitis with developing abscess  I offered the patient incision and drainage under anesthesia, explained that this is the most effective way to treat the infection.  He stated he does not want an open wound on his leg and if that is the treatment then I should have done that 10 days ago.  I tried to explain and reason with the patient, that it took sometime for a discrete abscess to form, he still refused surgery, please call if patient changes his mind.

## 2024-04-24 NOTE — PROGRESS NOTES
Zaria edwards reported patient having peaked T waves on telemetry monitor. Dr. Faith notified during rounds. New new orders at this time.

## 2024-04-24 NOTE — CARE PLAN
The patient is Stable - Low risk of patient condition declining or worsening    Shift Goals  Clinical Goals: pain management, IV ABX  Patient Goals: rest and comfort  Family Goals: AHSAN    Progress made toward(s) clinical / shift goals:    Problem: Pain - Standard  Goal: Alleviation of pain or a reduction in pain to the patient’s comfort goal  Outcome: Progressing  Note: Pt reports pain in left lower extremity. Medicated per MAR. Pt educated on use of call light for any pain management needs.      Problem: Knowledge Deficit - Standard  Goal: Patient and family/care givers will demonstrate understanding of plan of care, disease process/condition, diagnostic tests and medications  Outcome: Progressing  Note: Pt updated on POC, agreeable. All questions and concerns addressed at this time.      Problem: Skin Integrity  Goal: Skin integrity is maintained or improved  Outcome: Progressing

## 2024-04-24 NOTE — PROGRESS NOTES
Pt came in with complaints of nausea/vomiting/and diarrhea since last night. Pt states feeling weak and dehydrated. Pt denies eating anything out of the ordinary. Pt has slight abdominal distention and complaint of generalized abdominal pain. Pt aaoX4 and ambulatory with no assist. Pt on dialysis and blood thinners. Denies any blood in stools or vomit.            Psychosocial:  Patient is calm and cooperative.  Patients insight and judgement are appropriate to situation.  Appears clean, well maintained, with clothing appropriate to environment.  No evidence of delusions, hallucinations, or psychosis.     Neuro:  Eyes open spontaneously.  Awake, alert, oriented x 4.  Speech clear and appropriate.  Tolerating saliva secretions well.  Able to follow commands, demonstrating ability to actively and appropriately communicate within context of current conversation.  Symmetrical facial muscles.  Moving all extremities well with no noted weakness.  Adequate muscle tone present.          Airway:  Bilateral chest rise and fall.  RR regular and non-labored.  Air entry patent with and clear x 5 lobes of the lungs.  No crepitus or subcutaneous emphysema noted on palpation.       Circulatory:  Skin warm, dry, and pink.  Apical and radial pulses strong and regular.  Capillary refill/skin blanching less than 3 seconds to distal of 4 extremities.     Abdomen:  Abdomen distended.  Positive normo-active bowel sounds x 4 quadrants.       Urinary: Denies pressure, frequency, urgency, odor or pain.     Extremities:  No redness, heat, deformity, or pain.     Skin:  Intact with no bruising/discolorations noted.     Telemetry Shift Summary    Rhythm Sinus Rhythm  HR Range 75-82  Ectopy none  Measurements .20/.10/.40        Normal Values  Rhythm SR  HR Range    Measurements 0.12-0.20 / 0.06-0.10  / 0.30-0.52

## 2024-04-24 NOTE — PROGRESS NOTES
Hospital Medicine Daily Progress Note    Date of Service  4/23/2024    Chief Complaint  Oneal Tillman is a 53 y.o. male admitted 4/13/2024 with septic shock    Hospital Course  Patient is a 53-year-old male with diabetes and tobacco dependence who presented with leg swelling of the left leg for 1 week and body aches.  Questionable spider bite on the left leg after he changed his pants and then 2 days later started having worsening pain redness and swelling.  Also noticed chills palpitations weakness.  Tachycardic with a heart rate of 104 and low blood pressure.  His initial lactic acid was 4.3 and white count was 15.  Patient was initially admitted and given fluid resuscitation and started on IV antibiotics.  Blood cultures came back positive for group B strep and infectious disease recommended in addition to continuation of Zyvox to add pen G.  He is having worsening left leg erythema and ascending rash with blistering lesions.  Repeat CT left leg pending.    Interval Problem Update  Patient still feeling pain on the left side from his cellulitis.  - Patient is continued on IV Unasyn  - From exam, patient has leakage on the left popliteal area.  This area may need to be washed out.  We will need to follow-up with general surgery recommendations in the morning.  - I reviewed IDs note, possible 2 weeks of antibiotics.  - I reviewed labs, WBC 22.1, hemoglobin 11.5, platelets 545.    I have discussed this patient's plan of care and discharge plan at IDT rounds today with Case Management, Nursing, Nursing leadership, and other members of the IDT team.    Consultants/Specialty  general surgery and infectious disease    Code Status  Full Code    Disposition  The patient is not medically cleared for discharge to home or a post-acute facility.      I have placed the appropriate orders for post-discharge needs.    Review of Systems  Review of Systems   Constitutional:  Negative for chills, fever and malaise/fatigue.    Respiratory:  Negative for cough and shortness of breath.    Cardiovascular:  Negative for chest pain and palpitations.   Gastrointestinal:  Negative for abdominal pain, constipation, diarrhea, nausea and vomiting.   Musculoskeletal:  Negative for back pain and joint pain.   Skin:         Wound draining behind popliteal area   Neurological:  Negative for dizziness and headaches.   All other systems reviewed and are negative.       Physical Exam  Temp:  [36.6 °C (97.8 °F)-37 °C (98.6 °F)] 37 °C (98.6 °F)  Pulse:  [79-85] 80  Resp:  [16-18] 18  BP: (105-112)/(52-63) 112/63  SpO2:  [91 %-97 %] 97 %    Physical Exam  Vitals and nursing note reviewed.   Constitutional:       General: He is not in acute distress.     Appearance: He is not diaphoretic.   HENT:      Mouth/Throat:      Mouth: Mucous membranes are dry.      Pharynx: No oropharyngeal exudate.   Neck:      Comments: Right central line in place  Cardiovascular:      Rate and Rhythm: Normal rate and regular rhythm.      Pulses: Normal pulses.      Heart sounds: Normal heart sounds. No murmur heard.  Pulmonary:      Effort: Pulmonary effort is normal. No respiratory distress.      Breath sounds: Normal breath sounds. No wheezing or rales.   Abdominal:      General: Bowel sounds are normal. There is no distension.      Tenderness: There is no abdominal tenderness.   Musculoskeletal:         General: No swelling. Normal range of motion.   Skin:     General: Skin is warm.      Capillary Refill: Capillary refill takes less than 2 seconds.      Coloration: Skin is not jaundiced or pale.      Comments: Opening on the left popliteal region, purulent discharge noted   Neurological:      General: No focal deficit present.      Mental Status: He is alert and oriented to person, place, and time. Mental status is at baseline.      Motor: No weakness.   Psychiatric:         Mood and Affect: Mood normal.         Behavior: Behavior normal.         Thought Content: Thought  content normal.         Judgment: Judgment normal.         Fluids    Intake/Output Summary (Last 24 hours) at 4/23/2024 1956  Last data filed at 4/23/2024 1800  Gross per 24 hour   Intake 780 ml   Output 2450 ml   Net -1670 ml       Laboratory  Recent Labs     04/21/24  0606 04/22/24  0121 04/23/24  0015   WBC 33.4* 28.1* 22.1*   RBC 4.03* 4.11* 3.82*   HEMOGLOBIN 12.0* 12.2* 11.5*   HEMATOCRIT 36.8* 38.3* 34.9*   MCV 91.3 93.2 91.4   MCH 29.8 29.7 30.1   MCHC 32.6 31.9* 33.0   RDW 46.4 47.2 46.7   PLATELETCT 392 497* 545*   MPV 9.3 9.2 8.9*     Recent Labs     04/21/24  0606 04/22/24  0121 04/23/24  0015   SODIUM 133* 134* 136   POTASSIUM 4.4 4.2 4.1   CHLORIDE 101 101 103   CO2 23 22 22   GLUCOSE 109* 169* 163*   BUN 12 14 14   CREATININE 0.55 0.66 0.75   CALCIUM 7.5* 7.4* 7.7*                   Imaging  MR-FEMUR-WITH & W/O LEFT   Final Result      1.  Left lateral/posterior site cellulitis      2.  Rim-enhancing fluid collection consistent with abscess within the subcutaneous tissues of the left thigh extending from the 1:00-2:00 position to the 7:00 position, measuring 12 mm in thickness, 16.7 cm anterior to posterior, and craniocaudal extent    extending from the level of the left greater trochanter to the distal thigh measuring approximately 40 cm      3.   Nonspecific edema within the hamstring musculature      4.  Negative for osteomyelitis      US-ABDOMEN LTD (SOFT TISSUE)   Final Result         1. No discrete fluid collection identified.      US-EXTREMITY NON VASCULAR UNILATERAL LEFT   Final Result      Edema without a discrete fluid collection.      CT-EXTREMITY, LOWER WITH LEFT   Final Result      1.  Subcutaneous inflammatory changes about the left lower leg and thigh suspicious for cellulitis.      2.  Curvilinear fluid attenuation adjacent to the musculature of the lateral aspect of the left thigh measuring 1.3 cm in greatest diameter suspicious for subcutaneous seroma less likely early abscess  formation.      EC-ECHOCARDIOGRAM COMPLETE W/O CONT   Final Result      US-EXTREMITY VENOUS LOWER UNILAT LEFT   Final Result         No evidence of acute deep venous thrombosis in the visualized venous segments of the left lower extremity.         DX-CHEST-FOR LINE PLACEMENT Perform procedure in: Patient's Room   Final Result      1.  Interval insertion of a central venous catheter which terminates with the tip projecting over the expected region of the mid to distal superior vena cava.   2.  No acute cardiopulmonary.      CT-EXTREMITY, LOWER W/O LEFT   Final Result      1.  Left lower extremity edema versus cellulitis. No drainable fluid collections on this noncontrast study.   2.  Enlarged left external iliac and inguinal lymph nodes. They may be reactive to the left lower extremity infection, however, neoplastic nodes are difficult to rule out. Close clinical follow-up is needed.      DX-CHEST-PORTABLE (1 VIEW)   Final Result      No acute cardiopulmonary abnormality.              Assessment/Plan  * Septic shock (HCC)- (present on admission)  Assessment & Plan  - I reviewed IDs note, possible 2 weeks of antibiotics.    Bacteremia due to Streptococcus- (present on admission)  Assessment & Plan  4/13/24 blood culture positive for strep group A  4/15  -cultures negative x 2    - I ordered echocardiogram -which did not show any signs of vegetations  -Repeat CT left lower extremity shows small questionable fluid collection but more consistent with cellulitis.    - I reviewed MRI showed ongoing left leg posterior site cellulitis, extensive fluid collection consistent with an abscess in the subcutaneous tissue, nonspecific edema of the hamstring musculature, no osteomyelitis.  - From exam, patient has leakage on the left popliteal area.  This area may need to be washed out.  We will need to follow-up with general surgery recommendations in the morning.  - I reviewed IDs note, possible 2 weeks of antibiotics. Continue IV  Unasyn.      Hypokalemia- (present on admission)  Assessment & Plan  Potassium 4.1    Hypophosphatemia- (present on admission)  Assessment & Plan  Recheck lab in the morning, last check was 1.9    Metabolic acidosis- (present on admission)  Assessment & Plan  Bicarb 22    Hyponatremia- (present on admission)  Assessment & Plan  136    Dehydration- (present on admission)  Assessment & Plan  Resolved, IV diuresis prn      Acute kidney injury (HCC)- (present on admission)  Assessment & Plan  Resolved, now with volume overload, continue as needed Lasix    Tobacco dependence- (present on admission)  Assessment & Plan  Counseled on admission    Hyperglycemia- (present on admission)  Assessment & Plan  A1c 5.8, prediabetes    Cellulitis of left lower extremity- (present on admission)  Assessment & Plan  - I reviewed labs, WBC 22.1  -Continue on IV Unasyn         VTE prophylaxis:    heparin ppx      I have performed a physical exam and reviewed and updated ROS and Plan today (4/23/2024). In review of yesterday's note (4/22/2024), there are no changes except as documented above.      Total time spent 51 minutes. I spent greater than 50% of the time for patient care, including unit/floor time, multiple face-to-face encounters with the patient, counseling on treatment plan and discussion with bedside RN.  Further, I spent time on my own review of patient's overnight events, RN notes, imaging and lab analysis, and developing my assessment and plan above.  In addition, working with , social workers, and Charge RN on case coordination on this date.     Declined

## 2024-04-25 LAB
ALBUMIN SERPL BCP-MCNC: 2.7 G/DL (ref 3.2–4.9)
APTT PPP: 25.3 SEC (ref 24.7–36)
BUN SERPL-MCNC: 12 MG/DL (ref 8–22)
CALCIUM ALBUM COR SERPL-MCNC: 9.1 MG/DL (ref 8.5–10.5)
CALCIUM SERPL-MCNC: 8.1 MG/DL (ref 8.4–10.2)
CHLORIDE SERPL-SCNC: 103 MMOL/L (ref 96–112)
CO2 SERPL-SCNC: 25 MMOL/L (ref 20–33)
CREAT SERPL-MCNC: 0.68 MG/DL (ref 0.5–1.4)
ERYTHROCYTE [DISTWIDTH] IN BLOOD BY AUTOMATED COUNT: 47.2 FL (ref 35.9–50)
GFR SERPLBLD CREATININE-BSD FMLA CKD-EPI: 110 ML/MIN/1.73 M 2
GLUCOSE SERPL-MCNC: 149 MG/DL (ref 65–99)
HCT VFR BLD AUTO: 35.1 % (ref 42–52)
HGB BLD-MCNC: 11.5 G/DL (ref 14–18)
INR PPP: 1.02 (ref 0.87–1.13)
MAGNESIUM SERPL-MCNC: 2 MG/DL (ref 1.5–2.5)
MCH RBC QN AUTO: 30 PG (ref 27–33)
MCHC RBC AUTO-ENTMCNC: 32.8 G/DL (ref 32.3–36.5)
MCV RBC AUTO: 91.6 FL (ref 81.4–97.8)
PHOSPHATE SERPL-MCNC: 3.2 MG/DL (ref 2.5–4.5)
PLATELET # BLD AUTO: 701 K/UL (ref 164–446)
PMV BLD AUTO: 8.4 FL (ref 9–12.9)
POTASSIUM SERPL-SCNC: 4.1 MMOL/L (ref 3.6–5.5)
PROTHROMBIN TIME: 13.8 SEC (ref 12–14.6)
RBC # BLD AUTO: 3.83 M/UL (ref 4.7–6.1)
SODIUM SERPL-SCNC: 136 MMOL/L (ref 135–145)
WBC # BLD AUTO: 15.5 K/UL (ref 4.8–10.8)

## 2024-04-25 PROCEDURE — A9270 NON-COVERED ITEM OR SERVICE: HCPCS | Performed by: HOSPITALIST

## 2024-04-25 PROCEDURE — 85610 PROTHROMBIN TIME: CPT

## 2024-04-25 PROCEDURE — 700105 HCHG RX REV CODE 258: Performed by: INTERNAL MEDICINE

## 2024-04-25 PROCEDURE — 94760 N-INVAS EAR/PLS OXIMETRY 1: CPT

## 2024-04-25 PROCEDURE — 700102 HCHG RX REV CODE 250 W/ 637 OVERRIDE(OP): Performed by: HOSPITALIST

## 2024-04-25 PROCEDURE — 700111 HCHG RX REV CODE 636 W/ 250 OVERRIDE (IP): Performed by: HOSPITALIST

## 2024-04-25 PROCEDURE — 80069 RENAL FUNCTION PANEL: CPT

## 2024-04-25 PROCEDURE — 770020 HCHG ROOM/CARE - TELE (206)

## 2024-04-25 PROCEDURE — 99233 SBSQ HOSP IP/OBS HIGH 50: CPT | Performed by: INTERNAL MEDICINE

## 2024-04-25 PROCEDURE — 85027 COMPLETE CBC AUTOMATED: CPT

## 2024-04-25 PROCEDURE — 700111 HCHG RX REV CODE 636 W/ 250 OVERRIDE (IP): Mod: JZ | Performed by: INTERNAL MEDICINE

## 2024-04-25 PROCEDURE — 83735 ASSAY OF MAGNESIUM: CPT

## 2024-04-25 PROCEDURE — 85730 THROMBOPLASTIN TIME PARTIAL: CPT

## 2024-04-25 RX ADMIN — OXYCODONE HYDROCHLORIDE 10 MG: 10 TABLET ORAL at 20:23

## 2024-04-25 RX ADMIN — OXYCODONE HYDROCHLORIDE 10 MG: 10 TABLET ORAL at 09:53

## 2024-04-25 RX ADMIN — HEPARIN SODIUM 5000 UNITS: 5000 INJECTION, SOLUTION INTRAVENOUS; SUBCUTANEOUS at 14:11

## 2024-04-25 RX ADMIN — AMPICILLIN AND SULBACTAM 3 G: 1; 2 INJECTION, POWDER, FOR SOLUTION INTRAMUSCULAR; INTRAVENOUS at 11:38

## 2024-04-25 RX ADMIN — OXYCODONE HYDROCHLORIDE 10 MG: 10 TABLET ORAL at 02:50

## 2024-04-25 RX ADMIN — HEPARIN SODIUM 5000 UNITS: 5000 INJECTION, SOLUTION INTRAVENOUS; SUBCUTANEOUS at 06:25

## 2024-04-25 RX ADMIN — HEPARIN SODIUM 5000 UNITS: 5000 INJECTION, SOLUTION INTRAVENOUS; SUBCUTANEOUS at 22:25

## 2024-04-25 RX ADMIN — SENNOSIDES AND DOCUSATE SODIUM 2 TABLET: 50; 8.6 TABLET ORAL at 06:24

## 2024-04-25 RX ADMIN — AMPICILLIN AND SULBACTAM 3 G: 1; 2 INJECTION, POWDER, FOR SOLUTION INTRAMUSCULAR; INTRAVENOUS at 17:18

## 2024-04-25 RX ADMIN — AMPICILLIN AND SULBACTAM 3 G: 1; 2 INJECTION, POWDER, FOR SOLUTION INTRAMUSCULAR; INTRAVENOUS at 06:21

## 2024-04-25 RX ADMIN — OXYCODONE HYDROCHLORIDE 10 MG: 10 TABLET ORAL at 17:17

## 2024-04-25 RX ADMIN — NICOTINE 14 MG: 14 PATCH, EXTENDED RELEASE TRANSDERMAL at 06:26

## 2024-04-25 RX ADMIN — OXYCODONE HYDROCHLORIDE 10 MG: 10 TABLET ORAL at 06:25

## 2024-04-25 RX ADMIN — OXYCODONE HYDROCHLORIDE 10 MG: 10 TABLET ORAL at 14:11

## 2024-04-25 ASSESSMENT — ENCOUNTER SYMPTOMS
BACK PAIN: 0
CONSTIPATION: 0
NAUSEA: 0
PALPITATIONS: 0
HEADACHES: 0
FEVER: 0
CHILLS: 0
DIZZINESS: 0
COUGH: 0
ABDOMINAL PAIN: 0
DIARRHEA: 0
SHORTNESS OF BREATH: 0
VOMITING: 0

## 2024-04-25 ASSESSMENT — PATIENT HEALTH QUESTIONNAIRE - PHQ9
1. LITTLE INTEREST OR PLEASURE IN DOING THINGS: NOT AT ALL
SUM OF ALL RESPONSES TO PHQ9 QUESTIONS 1 AND 2: 0
2. FEELING DOWN, DEPRESSED, IRRITABLE, OR HOPELESS: NOT AT ALL

## 2024-04-25 ASSESSMENT — PAIN DESCRIPTION - PAIN TYPE
TYPE: ACUTE PAIN

## 2024-04-25 NOTE — CARE PLAN
The patient is Stable - Low risk of patient condition declining or worsening    Shift Goals  Clinical Goals: continue antibiotics, Patient will remain free from falls throughout shift.  Patient Goals: sleep  Family Goals: isidoro    Progress made toward(s) clinical / shift goals:  Patient remained free from falls throughout shift with fall precautions including call light within reach, bed in lowest and locked position.    Patient is not progressing towards the following goals:

## 2024-04-25 NOTE — CARE PLAN
The patient is Stable - Low risk of patient condition declining or worsening    Shift Goals  Clinical Goals: Continue IV abx, discuss possible incision and drain of abcess.  Patient Goals: Get better  Family Goals: isidoro    Progress made toward(s) clinical / shift goals:    Problem: Pain - Standard  Goal: Alleviation of pain or a reduction in pain to the patient’s comfort goal  Outcome: Progressing  Note: Adequately managed per MAR.      Problem: Hemodynamics  Goal: Patient's hemodynamics, fluid balance and neurologic status will be stable or improve  Outcome: Progressing  Note: WBCs improved to 15.5; redness and swelling has decreased.        Patient is not progressing towards the following goals:

## 2024-04-25 NOTE — PROGRESS NOTES
Telemetry Shift Summary    Rhythm SR  HR Range 77-82  Ectopy none reported by MT  Measurements 0.16/0.10/0.38        Normal Values  Rhythm SR  HR Range    Measurements 0.12-0.20 / 0.06-0.10  / 0.30-0.52

## 2024-04-25 NOTE — PROGRESS NOTES
Report received from Cecilia OWEN, assumed care of pt. At 1915.  POC and medications reviewed with pt. Pt verbalized understanding.   AOx4  C/o 4/10 pain at this time.  Denies SOB, or dizziness at this time.   Safety measures in place.  Hourly rounding in place.

## 2024-04-25 NOTE — PROGRESS NOTES
Hospital Medicine Daily Progress Note    Date of Service  4/24/2024    Chief Complaint  Oneal Tillman is a 53 y.o. male admitted 4/13/2024 with septic shock    Hospital Course  Patient is a 53-year-old male with diabetes and tobacco dependence who presented with leg swelling of the left leg for 1 week and body aches.  Questionable spider bite on the left leg after he changed his pants and then 2 days later started having worsening pain redness and swelling.  Also noticed chills palpitations weakness.  Tachycardic with a heart rate of 104 and low blood pressure.  His initial lactic acid was 4.3 and white count was 15.  Patient was initially admitted and given fluid resuscitation and started on IV antibiotics.  Blood cultures came back positive for group B strep and infectious disease recommended in addition to continuation of Zyvox to add pen G.  He is having worsening left leg erythema and ascending rash with blistering lesions.  Repeat CT left leg pending.    Interval Problem Update  I discussed case with general surgeon Dr. Olmstead, she offered incision and drainage of the left popliteal region given purulent discharge.  - I discussed with patient about treatment plans for surgery but at this time patient wanted to think about his possibilities.  - Will continue on IV Unasyn    I have discussed this patient's plan of care and discharge plan at IDT rounds today with Case Management, Nursing, Nursing leadership, and other members of the IDT team.    Consultants/Specialty  general surgery and infectious disease    Code Status  Full Code    Disposition  The patient is not medically cleared for discharge to home or a post-acute facility.      I have placed the appropriate orders for post-discharge needs.    Review of Systems  Review of Systems   Constitutional:  Negative for chills, fever and malaise/fatigue.   Respiratory:  Negative for cough and shortness of breath.    Cardiovascular:  Negative for chest pain and  palpitations.   Gastrointestinal:  Negative for abdominal pain, constipation, diarrhea, nausea and vomiting.   Musculoskeletal:  Negative for back pain and joint pain.   Skin:         Wound draining behind popliteal area   Neurological:  Negative for dizziness and headaches.   All other systems reviewed and are negative.       Physical Exam  Temp:  [36.4 °C (97.6 °F)-37.2 °C (98.9 °F)] 37.1 °C (98.7 °F)  Pulse:  [69-93] 80  Resp:  [16-18] 18  BP: (107-112)/(56-65) 108/56  SpO2:  [93 %-97 %] 93 %    Physical Exam  Vitals and nursing note reviewed.   Constitutional:       General: He is not in acute distress.     Appearance: He is not diaphoretic.   HENT:      Mouth/Throat:      Mouth: Mucous membranes are dry.      Pharynx: No oropharyngeal exudate.   Neck:      Comments: Right central line in place  Cardiovascular:      Rate and Rhythm: Normal rate and regular rhythm.      Pulses: Normal pulses.      Heart sounds: Normal heart sounds. No murmur heard.  Pulmonary:      Effort: Pulmonary effort is normal. No respiratory distress.      Breath sounds: Normal breath sounds. No wheezing or rales.   Abdominal:      General: Bowel sounds are normal. There is no distension.      Tenderness: There is no abdominal tenderness.   Musculoskeletal:         General: No swelling. Normal range of motion.   Skin:     General: Skin is warm.      Capillary Refill: Capillary refill takes less than 2 seconds.      Coloration: Skin is not jaundiced or pale.      Comments: Opening on the left popliteal region, purulent discharge noted   Neurological:      General: No focal deficit present.      Mental Status: He is alert and oriented to person, place, and time. Mental status is at baseline.      Motor: No weakness.   Psychiatric:         Mood and Affect: Mood normal.         Behavior: Behavior normal.         Thought Content: Thought content normal.         Judgment: Judgment normal.         Fluids    Intake/Output Summary (Last 24 hours)  at 4/24/2024 1819  Last data filed at 4/24/2024 1400  Gross per 24 hour   Intake 600 ml   Output 950 ml   Net -350 ml       Laboratory  Recent Labs     04/22/24  0121 04/23/24  0015 04/24/24  0024   WBC 28.1* 22.1* 17.9*   RBC 4.11* 3.82* 3.84*   HEMOGLOBIN 12.2* 11.5* 11.5*   HEMATOCRIT 38.3* 34.9* 35.0*   MCV 93.2 91.4 91.1   MCH 29.7 30.1 29.9   MCHC 31.9* 33.0 32.9   RDW 47.2 46.7 46.4   PLATELETCT 497* 545* 629*   MPV 9.2 8.9* 8.6*     Recent Labs     04/22/24  0121 04/23/24  0015 04/24/24  0024   SODIUM 134* 136 134*   POTASSIUM 4.2 4.1 4.4   CHLORIDE 101 103 103   CO2 22 22 23   GLUCOSE 169* 163* 101*   BUN 14 14 13   CREATININE 0.66 0.75 0.61   CALCIUM 7.4* 7.7* 8.0*                   Imaging  MR-FEMUR-WITH & W/O LEFT   Final Result      1.  Left lateral/posterior site cellulitis      2.  Rim-enhancing fluid collection consistent with abscess within the subcutaneous tissues of the left thigh extending from the 1:00-2:00 position to the 7:00 position, measuring 12 mm in thickness, 16.7 cm anterior to posterior, and craniocaudal extent    extending from the level of the left greater trochanter to the distal thigh measuring approximately 40 cm      3.   Nonspecific edema within the hamstring musculature      4.  Negative for osteomyelitis      US-ABDOMEN LTD (SOFT TISSUE)   Final Result         1. No discrete fluid collection identified.      US-EXTREMITY NON VASCULAR UNILATERAL LEFT   Final Result      Edema without a discrete fluid collection.      CT-EXTREMITY, LOWER WITH LEFT   Final Result      1.  Subcutaneous inflammatory changes about the left lower leg and thigh suspicious for cellulitis.      2.  Curvilinear fluid attenuation adjacent to the musculature of the lateral aspect of the left thigh measuring 1.3 cm in greatest diameter suspicious for subcutaneous seroma less likely early abscess formation.      EC-ECHOCARDIOGRAM COMPLETE W/O CONT   Final Result      US-EXTREMITY VENOUS LOWER UNILAT LEFT    Final Result         No evidence of acute deep venous thrombosis in the visualized venous segments of the left lower extremity.         DX-CHEST-FOR LINE PLACEMENT Perform procedure in: Patient's Room   Final Result      1.  Interval insertion of a central venous catheter which terminates with the tip projecting over the expected region of the mid to distal superior vena cava.   2.  No acute cardiopulmonary.      CT-EXTREMITY, LOWER W/O LEFT   Final Result      1.  Left lower extremity edema versus cellulitis. No drainable fluid collections on this noncontrast study.   2.  Enlarged left external iliac and inguinal lymph nodes. They may be reactive to the left lower extremity infection, however, neoplastic nodes are difficult to rule out. Close clinical follow-up is needed.      DX-CHEST-PORTABLE (1 VIEW)   Final Result      No acute cardiopulmonary abnormality.              Assessment/Plan  * Septic shock (HCC)- (present on admission)  Assessment & Plan  Continue IV Unasyn    Bacteremia due to Streptococcus- (present on admission)  Assessment & Plan  4/13/24 blood culture positive for strep group A  4/15  -cultures negative x 2    - I ordered echocardiogram -which did not show any signs of vegetations  -Repeat CT left lower extremity shows small questionable fluid collection but more consistent with cellulitis.    - I reviewed MRI showed ongoing left leg posterior site cellulitis, extensive fluid collection consistent with an abscess in the subcutaneous tissue, nonspecific edema of the hamstring musculature, no osteomyelitis.  - From exam, patient has leakage on the left popliteal area.  This area may need to be washed out.  We will need to follow-up with general surgery recommendations in the morning.  - I reviewed IDs note, possible 2 weeks of antibiotics.    Continue on IV Unasyn      Hypokalemia- (present on admission)  Assessment & Plan   potassium 4.4    Hypophosphatemia- (present on admission)  Assessment &  Plan   Phos 3.5    Metabolic acidosis- (present on admission)  Assessment & Plan  bicarb 23    Hyponatremia- (present on admission)  Assessment & Plan   Sodium 134    Dehydration- (present on admission)  Assessment & Plan  Resolved, IV diuresis prn      Acute kidney injury (HCC)- (present on admission)  Assessment & Plan  Resolved, now with volume overload, continue as needed Lasix    Tobacco dependence- (present on admission)  Assessment & Plan  Counseled on admission    Hyperglycemia- (present on admission)  Assessment & Plan  A1c 5.8, prediabetes    Cellulitis of left lower extremity- (present on admission)  Assessment & Plan  - I reviewed labs, WBC 22.1  - Continue antibiotics with IV Unasyn  I discussed case with general surgeon Dr. Olmstead, she offered incision and drainage of the left popliteal region given purulent discharge.  - I discussed with patient about treatment plans for surgery but at this time patient wanted to think about his possibilities.         VTE prophylaxis:    heparin ppx      I have performed a physical exam and reviewed and updated ROS and Plan today (4/24/2024). In review of yesterday's note (4/23/2024), there are no changes except as documented above.      Total time spent 52 minutes.    This included my review of patient's overnight RN notes, face to face interview, physical examination, lab analysis.  Also includes repeat visits with the patient, and my documented assessments and interventions above.  I have spoken with specialist from general surgeon.  In addition, I spoke with entire care team on patient's treatment plan, and DC planning on morning rounds and IDT rounds.

## 2024-04-26 LAB
ALBUMIN SERPL BCP-MCNC: 2.7 G/DL (ref 3.2–4.9)
BUN SERPL-MCNC: 14 MG/DL (ref 8–22)
CALCIUM ALBUM COR SERPL-MCNC: 9 MG/DL (ref 8.5–10.5)
CALCIUM SERPL-MCNC: 8 MG/DL (ref 8.4–10.2)
CHLORIDE SERPL-SCNC: 102 MMOL/L (ref 96–112)
CO2 SERPL-SCNC: 24 MMOL/L (ref 20–33)
CREAT SERPL-MCNC: 0.6 MG/DL (ref 0.5–1.4)
ERYTHROCYTE [DISTWIDTH] IN BLOOD BY AUTOMATED COUNT: 46.9 FL (ref 35.9–50)
GFR SERPLBLD CREATININE-BSD FMLA CKD-EPI: 114 ML/MIN/1.73 M 2
GLUCOSE SERPL-MCNC: 107 MG/DL (ref 65–99)
HCT VFR BLD AUTO: 34.7 % (ref 42–52)
HGB BLD-MCNC: 11.3 G/DL (ref 14–18)
MAGNESIUM SERPL-MCNC: 1.9 MG/DL (ref 1.5–2.5)
MCH RBC QN AUTO: 30 PG (ref 27–33)
MCHC RBC AUTO-ENTMCNC: 32.6 G/DL (ref 32.3–36.5)
MCV RBC AUTO: 92 FL (ref 81.4–97.8)
PHOSPHATE SERPL-MCNC: 3.4 MG/DL (ref 2.5–4.5)
PLATELET # BLD AUTO: 691 K/UL (ref 164–446)
PMV BLD AUTO: 8.3 FL (ref 9–12.9)
POTASSIUM SERPL-SCNC: 4.2 MMOL/L (ref 3.6–5.5)
RBC # BLD AUTO: 3.77 M/UL (ref 4.7–6.1)
SODIUM SERPL-SCNC: 135 MMOL/L (ref 135–145)
WBC # BLD AUTO: 14 K/UL (ref 4.8–10.8)

## 2024-04-26 PROCEDURE — 80069 RENAL FUNCTION PANEL: CPT

## 2024-04-26 PROCEDURE — 700105 HCHG RX REV CODE 258: Performed by: INTERNAL MEDICINE

## 2024-04-26 PROCEDURE — 85027 COMPLETE CBC AUTOMATED: CPT

## 2024-04-26 PROCEDURE — A9270 NON-COVERED ITEM OR SERVICE: HCPCS | Performed by: INTERNAL MEDICINE

## 2024-04-26 PROCEDURE — 770020 HCHG ROOM/CARE - TELE (206)

## 2024-04-26 PROCEDURE — 99233 SBSQ HOSP IP/OBS HIGH 50: CPT | Performed by: INTERNAL MEDICINE

## 2024-04-26 PROCEDURE — 94760 N-INVAS EAR/PLS OXIMETRY 1: CPT

## 2024-04-26 PROCEDURE — 83735 ASSAY OF MAGNESIUM: CPT

## 2024-04-26 PROCEDURE — 700102 HCHG RX REV CODE 250 W/ 637 OVERRIDE(OP): Performed by: HOSPITALIST

## 2024-04-26 PROCEDURE — 700102 HCHG RX REV CODE 250 W/ 637 OVERRIDE(OP): Performed by: INTERNAL MEDICINE

## 2024-04-26 PROCEDURE — 700111 HCHG RX REV CODE 636 W/ 250 OVERRIDE (IP): Performed by: HOSPITALIST

## 2024-04-26 PROCEDURE — 700111 HCHG RX REV CODE 636 W/ 250 OVERRIDE (IP): Mod: JZ | Performed by: INTERNAL MEDICINE

## 2024-04-26 PROCEDURE — A9270 NON-COVERED ITEM OR SERVICE: HCPCS | Performed by: HOSPITALIST

## 2024-04-26 RX ADMIN — OXYCODONE HYDROCHLORIDE 10 MG: 10 TABLET ORAL at 15:04

## 2024-04-26 RX ADMIN — OXYCODONE HYDROCHLORIDE 10 MG: 10 TABLET ORAL at 19:11

## 2024-04-26 RX ADMIN — OXYCODONE HYDROCHLORIDE 10 MG: 10 TABLET ORAL at 08:13

## 2024-04-26 RX ADMIN — OXYCODONE HYDROCHLORIDE 10 MG: 10 TABLET ORAL at 04:51

## 2024-04-26 RX ADMIN — HEPARIN SODIUM 5000 UNITS: 5000 INJECTION, SOLUTION INTRAVENOUS; SUBCUTANEOUS at 04:45

## 2024-04-26 RX ADMIN — NICOTINE 14 MG: 14 PATCH, EXTENDED RELEASE TRANSDERMAL at 04:41

## 2024-04-26 RX ADMIN — AMPICILLIN AND SULBACTAM 3 G: 1; 2 INJECTION, POWDER, FOR SOLUTION INTRAMUSCULAR; INTRAVENOUS at 23:22

## 2024-04-26 RX ADMIN — RIVAROXABAN 10 MG: 10 TABLET, FILM COATED ORAL at 17:28

## 2024-04-26 RX ADMIN — OXYCODONE HYDROCHLORIDE 10 MG: 10 TABLET ORAL at 00:04

## 2024-04-26 RX ADMIN — OXYCODONE HYDROCHLORIDE 10 MG: 10 TABLET ORAL at 23:19

## 2024-04-26 RX ADMIN — AMPICILLIN AND SULBACTAM 3 G: 1; 2 INJECTION, POWDER, FOR SOLUTION INTRAMUSCULAR; INTRAVENOUS at 04:44

## 2024-04-26 RX ADMIN — AMPICILLIN AND SULBACTAM 3 G: 1; 2 INJECTION, POWDER, FOR SOLUTION INTRAMUSCULAR; INTRAVENOUS at 00:01

## 2024-04-26 RX ADMIN — AMPICILLIN AND SULBACTAM 3 G: 1; 2 INJECTION, POWDER, FOR SOLUTION INTRAMUSCULAR; INTRAVENOUS at 17:27

## 2024-04-26 RX ADMIN — AMPICILLIN AND SULBACTAM 3 G: 1; 2 INJECTION, POWDER, FOR SOLUTION INTRAMUSCULAR; INTRAVENOUS at 11:52

## 2024-04-26 ASSESSMENT — PATIENT HEALTH QUESTIONNAIRE - PHQ9
SUM OF ALL RESPONSES TO PHQ9 QUESTIONS 1 AND 2: 0
2. FEELING DOWN, DEPRESSED, IRRITABLE, OR HOPELESS: NOT AT ALL
1. LITTLE INTEREST OR PLEASURE IN DOING THINGS: NOT AT ALL

## 2024-04-26 ASSESSMENT — ENCOUNTER SYMPTOMS
DIARRHEA: 0
BACK PAIN: 0
NAUSEA: 0
FEVER: 0
CONSTIPATION: 0
COUGH: 0
SHORTNESS OF BREATH: 0
DIZZINESS: 0
VOMITING: 0
PALPITATIONS: 0
ABDOMINAL PAIN: 0
HEADACHES: 0
CHILLS: 0

## 2024-04-26 ASSESSMENT — PAIN DESCRIPTION - PAIN TYPE
TYPE: ACUTE PAIN

## 2024-04-26 ASSESSMENT — FIBROSIS 4 INDEX: FIB4 SCORE: 0.48

## 2024-04-26 NOTE — PROGRESS NOTES
Telemetry shift summary:  Rhythm: SR     HR Range: 70's to 90's      Measurements from strip printed at 01:41:20   MI: 0.14   QRS 0.08   QT 0.34     Ectopies (As per Telemetry Tech report) Rare PAC and PVC.

## 2024-04-26 NOTE — PROGRESS NOTES
Hospital Medicine Daily Progress Note    Date of Service  4/26/2024    Chief Complaint  Oneal Tillman is a 53 y.o. male admitted 4/13/2024 with septic shock    Hospital Course  Patient is a 53-year-old male with diabetes and tobacco dependence who presented with leg swelling of the left leg for 1 week and body aches.  Questionable spider bite on the left leg after he changed his pants and then 2 days later started having worsening pain redness and swelling.  Also noticed chills palpitations weakness.  Tachycardic with a heart rate of 104 and low blood pressure.  His initial lactic acid was 4.3 and white count was 15.  Patient was initially admitted and given fluid resuscitation and started on IV antibiotics.  Blood cultures came back positive for group B strep and infectious disease recommended in addition to continuation of Zyvox to add pen G.  He is having worsening left leg erythema and ascending rash with blistering lesions.  Repeat CT left leg pending.    Interval Problem Update  Patient was eval by general surgery today with Dr. Whitten, he recommended no surgery.  - I reviewed labs, WBC 14,000, downward trending.  Platelets 691 remains elevated.  At this time we will continue IV Unasyn until patient's leukocytosis resolves and then switch to oral antibiotics hopefully for discharge.  Dr. Whitten agrees with this plan.    I have discussed this patient's plan of care and discharge plan at IDT rounds today with Case Management, Nursing, Nursing leadership, and other members of the IDT team.    Consultants/Specialty  general surgery and infectious disease    Code Status  Full Code    Disposition  The patient is not medically cleared for discharge to home or a post-acute facility.  Anticipate discharge to: home with organized home healthcare and close outpatient follow-up    I have placed the appropriate orders for post-discharge needs.    Review of Systems  Review of Systems   Constitutional:  Negative for chills,  fever and malaise/fatigue.   Respiratory:  Negative for cough and shortness of breath.    Cardiovascular:  Negative for chest pain and palpitations.   Gastrointestinal:  Negative for abdominal pain, constipation, diarrhea, nausea and vomiting.   Musculoskeletal:  Negative for back pain and joint pain.   Skin:         Wound draining behind popliteal area   Neurological:  Negative for dizziness and headaches.   All other systems reviewed and are negative.       Physical Exam  Temp:  [36.3 °C (97.3 °F)-36.9 °C (98.4 °F)] 36.4 °C (97.5 °F)  Pulse:  [80-82] 82  Resp:  [16] 16  BP: (101-115)/(50-63) 103/58  SpO2:  [94 %-96 %] 96 %    Physical Exam  Vitals and nursing note reviewed.   Constitutional:       General: He is not in acute distress.  Cardiovascular:      Rate and Rhythm: Normal rate and regular rhythm.      Pulses: Normal pulses.      Heart sounds: Normal heart sounds. No murmur heard.  Pulmonary:      Effort: Pulmonary effort is normal. No respiratory distress.      Breath sounds: Normal breath sounds.   Abdominal:      General: Bowel sounds are normal. There is no distension.   Musculoskeletal:         General: Tenderness (Left thigh) present. No swelling. Normal range of motion.   Skin:     General: Skin is warm.      Coloration: Skin is not jaundiced or pale.      Comments: Ongoing purulent drainage from left popliteal wound   Neurological:      Mental Status: He is alert and oriented to person, place, and time.      Motor: No weakness.   Psychiatric:         Mood and Affect: Mood normal.         Behavior: Behavior normal.         Fluids    Intake/Output Summary (Last 24 hours) at 4/26/2024 1045  Last data filed at 4/26/2024 0650  Gross per 24 hour   Intake 700 ml   Output 1300 ml   Net -600 ml       Laboratory  Recent Labs     04/24/24  0024 04/25/24  1005 04/26/24  0430   WBC 17.9* 15.5* 14.0*   RBC 3.84* 3.83* 3.77*   HEMOGLOBIN 11.5* 11.5* 11.3*   HEMATOCRIT 35.0* 35.1* 34.7*   MCV 91.1 91.6 92.0   MCH  29.9 30.0 30.0   MCHC 32.9 32.8 32.6   RDW 46.4 47.2 46.9   PLATELETCT 629* 701* 691*   MPV 8.6* 8.4* 8.3*     Recent Labs     04/24/24  0024 04/25/24  1005 04/26/24  0430   SODIUM 134* 136 135   POTASSIUM 4.4 4.1 4.2   CHLORIDE 103 103 102   CO2 23 25 24   GLUCOSE 101* 149* 107*   BUN 13 12 14   CREATININE 0.61 0.68 0.60   CALCIUM 8.0* 8.1* 8.0*     Recent Labs     04/25/24  1005   APTT 25.3   INR 1.02               Imaging  MR-FEMUR-WITH & W/O LEFT   Final Result      1.  Left lateral/posterior site cellulitis      2.  Rim-enhancing fluid collection consistent with abscess within the subcutaneous tissues of the left thigh extending from the 1:00-2:00 position to the 7:00 position, measuring 12 mm in thickness, 16.7 cm anterior to posterior, and craniocaudal extent    extending from the level of the left greater trochanter to the distal thigh measuring approximately 40 cm      3.   Nonspecific edema within the hamstring musculature      4.  Negative for osteomyelitis      US-ABDOMEN LTD (SOFT TISSUE)   Final Result         1. No discrete fluid collection identified.      US-EXTREMITY NON VASCULAR UNILATERAL LEFT   Final Result      Edema without a discrete fluid collection.      CT-EXTREMITY, LOWER WITH LEFT   Final Result      1.  Subcutaneous inflammatory changes about the left lower leg and thigh suspicious for cellulitis.      2.  Curvilinear fluid attenuation adjacent to the musculature of the lateral aspect of the left thigh measuring 1.3 cm in greatest diameter suspicious for subcutaneous seroma less likely early abscess formation.      EC-ECHOCARDIOGRAM COMPLETE W/O CONT   Final Result      US-EXTREMITY VENOUS LOWER UNILAT LEFT   Final Result         No evidence of acute deep venous thrombosis in the visualized venous segments of the left lower extremity.         DX-CHEST-FOR LINE PLACEMENT Perform procedure in: Patient's Room   Final Result      1.  Interval insertion of a central venous catheter which  terminates with the tip projecting over the expected region of the mid to distal superior vena cava.   2.  No acute cardiopulmonary.      CT-EXTREMITY, LOWER W/O LEFT   Final Result      1.  Left lower extremity edema versus cellulitis. No drainable fluid collections on this noncontrast study.   2.  Enlarged left external iliac and inguinal lymph nodes. They may be reactive to the left lower extremity infection, however, neoplastic nodes are difficult to rule out. Close clinical follow-up is needed.      DX-CHEST-PORTABLE (1 VIEW)   Final Result      No acute cardiopulmonary abnormality.              Assessment/Plan  * Septic shock (HCC)- (present on admission)  Assessment & Plan  Continue on Unasyn    Bacteremia due to Streptococcus- (present on admission)  Assessment & Plan  4/13/24 blood culture positive for strep group A  4/15  -cultures negative x 2    - Echocardiogram did not show any vegetations  -Repeat CT left lower extremity shows small questionable fluid collection but more consistent with cellulitis.    - I reviewed MRI showed ongoing left leg posterior site cellulitis, extensive fluid collection consistent with an abscess in the subcutaneous tissue, nonspecific edema of the hamstring musculature, no osteomyelitis.  - From exam, patient has leakage on the left popliteal area.  This area may need to be washed out.  We will need to follow-up with general surgery recommendations in the morning.  - I reviewed IDs note, possible 2 weeks of antibiotics.    We will continue IV Unasyn.  Leukocytosis persistent 14,000.    Hypokalemia- (present on admission)  Assessment & Plan  Potassium 4.2    Hypophosphatemia- (present on admission)  Assessment & Plan  Phos 3.4    Metabolic acidosis- (present on admission)  Assessment & Plan  Bicarb 24    Hyponatremia- (present on admission)  Assessment & Plan  Sodium 135    Dehydration- (present on admission)  Assessment & Plan  Resolved, IV diuresis prn      Acute kidney  injury (HCC)- (present on admission)  Assessment & Plan  Resolved, now with volume overload, continue as needed Lasix    Tobacco dependence- (present on admission)  Assessment & Plan  Counseled on admission    Hyperglycemia- (present on admission)  Assessment & Plan  A1c 5.8, prediabetes    Cellulitis of left lower extremity- (present on admission)  Assessment & Plan  General surgery Dr. Whitten recommended no surgery.  Continue IV Unasyn  - I ordered wound care         VTE prophylaxis:    Xarelto 10mg daily as prophylaxis      I have performed a physical exam and reviewed and updated ROS and Plan today (4/26/2024). In review of yesterday's note (4/25/2024), there are no changes except as documented above.      Total time spent 52 minutes.    This included my review of patient's overnight RN notes, face to face interview, physical examination, lab analysis.  Also includes repeat visits with the patient, and my documented assessments and interventions above.  I have spoken with specialist from general surgeon.  In addition, I spoke with entire care team on patient's treatment plan, and DC planning on morning rounds and IDT rounds.

## 2024-04-26 NOTE — PROGRESS NOTES
Bedside report received from Tereso OWEN and assumed Pt's cares. Call light within reach. Safety measures in place.

## 2024-04-26 NOTE — PROGRESS NOTES
Hospital Medicine Daily Progress Note    Date of Service  4/25/2024    Chief Complaint  Oneal Tillman is a 53 y.o. male admitted 4/13/2024 with septic shock    Hospital Course  Patient is a 53-year-old male with diabetes and tobacco dependence who presented with leg swelling of the left leg for 1 week and body aches.  Questionable spider bite on the left leg after he changed his pants and then 2 days later started having worsening pain redness and swelling.  Also noticed chills palpitations weakness.  Tachycardic with a heart rate of 104 and low blood pressure.  His initial lactic acid was 4.3 and white count was 15.  Patient was initially admitted and given fluid resuscitation and started on IV antibiotics.  Blood cultures came back positive for group B strep and infectious disease recommended in addition to continuation of Zyvox to add pen G.  He is having worsening left leg erythema and ascending rash with blistering lesions.  Repeat CT left leg pending.    Interval Problem Update  I discussed with patient about his current state.  We reviewed labs, WBC 15.5.  He has slow but improving numbers on IV Unasyn.  His platelets are reacting and elevating currently at 701.  - At this time it is concerning patient's slow progress that is day 12 in the hospital stay.  - We discussed about potential small incision and drainage by general surgery.  After further discussion, patient stated he is willing to retalk with general surgery tomorrow and have potential surgery.  We will keep patient n.p.o. at midnight.  - I did discuss case with Dr. Calix, who will discussed with Dr. Whitten who will be on tomorrow for surgery.    I have discussed this patient's plan of care and discharge plan at IDT rounds today with Case Management, Nursing, Nursing leadership, and other members of the IDT team.    Consultants/Specialty  general surgery and infectious disease    Code Status  Full Code    Disposition  The patient is not medically  cleared for discharge to home or a post-acute facility.      I have placed the appropriate orders for post-discharge needs.    Review of Systems  Review of Systems   Constitutional:  Negative for chills, fever and malaise/fatigue.   Respiratory:  Negative for cough and shortness of breath.    Cardiovascular:  Negative for chest pain and palpitations.   Gastrointestinal:  Negative for abdominal pain, constipation, diarrhea, nausea and vomiting.   Musculoskeletal:  Negative for back pain and joint pain.   Skin:         Wound draining behind popliteal area   Neurological:  Negative for dizziness and headaches.   All other systems reviewed and are negative.       Physical Exam  Temp:  [36.3 °C (97.3 °F)-37.1 °C (98.8 °F)] 36.3 °C (97.3 °F)  Pulse:  [72-84] 81  Resp:  [16-18] 16  BP: (101-114)/(50-61) 112/60  SpO2:  [94 %-97 %] 94 %    Physical Exam  Vitals and nursing note reviewed.   Constitutional:       General: He is not in acute distress.     Appearance: He is not diaphoretic.   HENT:      Mouth/Throat:      Mouth: Mucous membranes are dry.      Pharynx: No oropharyngeal exudate.   Neck:      Comments: Right central line in place  Cardiovascular:      Rate and Rhythm: Normal rate and regular rhythm.      Pulses: Normal pulses.      Heart sounds: Normal heart sounds. No murmur heard.  Pulmonary:      Effort: Pulmonary effort is normal. No respiratory distress.      Breath sounds: Normal breath sounds. No wheezing or rales.   Abdominal:      General: Bowel sounds are normal. There is no distension.      Tenderness: There is no abdominal tenderness.   Musculoskeletal:         General: No swelling. Normal range of motion.   Skin:     General: Skin is warm.      Capillary Refill: Capillary refill takes less than 2 seconds.      Coloration: Skin is not jaundiced or pale.      Comments: Opening on the left popliteal region, purulent discharge noted   Neurological:      General: No focal deficit present.      Mental  Status: He is alert and oriented to person, place, and time. Mental status is at baseline.      Motor: No weakness.   Psychiatric:         Mood and Affect: Mood normal.         Behavior: Behavior normal.         Thought Content: Thought content normal.         Judgment: Judgment normal.         Fluids    Intake/Output Summary (Last 24 hours) at 4/25/2024 1838  Last data filed at 4/25/2024 1526  Gross per 24 hour   Intake 780 ml   Output 1950 ml   Net -1170 ml       Laboratory  Recent Labs     04/23/24  0015 04/24/24  0024 04/25/24  1005   WBC 22.1* 17.9* 15.5*   RBC 3.82* 3.84* 3.83*   HEMOGLOBIN 11.5* 11.5* 11.5*   HEMATOCRIT 34.9* 35.0* 35.1*   MCV 91.4 91.1 91.6   MCH 30.1 29.9 30.0   MCHC 33.0 32.9 32.8   RDW 46.7 46.4 47.2   PLATELETCT 545* 629* 701*   MPV 8.9* 8.6* 8.4*     Recent Labs     04/23/24  0015 04/24/24  0024 04/25/24  1005   SODIUM 136 134* 136   POTASSIUM 4.1 4.4 4.1   CHLORIDE 103 103 103   CO2 22 23 25   GLUCOSE 163* 101* 149*   BUN 14 13 12   CREATININE 0.75 0.61 0.68   CALCIUM 7.7* 8.0* 8.1*     Recent Labs     04/25/24  1005   APTT 25.3   INR 1.02               Imaging  MR-FEMUR-WITH & W/O LEFT   Final Result      1.  Left lateral/posterior site cellulitis      2.  Rim-enhancing fluid collection consistent with abscess within the subcutaneous tissues of the left thigh extending from the 1:00-2:00 position to the 7:00 position, measuring 12 mm in thickness, 16.7 cm anterior to posterior, and craniocaudal extent    extending from the level of the left greater trochanter to the distal thigh measuring approximately 40 cm      3.   Nonspecific edema within the hamstring musculature      4.  Negative for osteomyelitis      US-ABDOMEN LTD (SOFT TISSUE)   Final Result         1. No discrete fluid collection identified.      US-EXTREMITY NON VASCULAR UNILATERAL LEFT   Final Result      Edema without a discrete fluid collection.      CT-EXTREMITY, LOWER WITH LEFT   Final Result      1.  Subcutaneous  inflammatory changes about the left lower leg and thigh suspicious for cellulitis.      2.  Curvilinear fluid attenuation adjacent to the musculature of the lateral aspect of the left thigh measuring 1.3 cm in greatest diameter suspicious for subcutaneous seroma less likely early abscess formation.      EC-ECHOCARDIOGRAM COMPLETE W/O CONT   Final Result      US-EXTREMITY VENOUS LOWER UNILAT LEFT   Final Result         No evidence of acute deep venous thrombosis in the visualized venous segments of the left lower extremity.         DX-CHEST-FOR LINE PLACEMENT Perform procedure in: Patient's Room   Final Result      1.  Interval insertion of a central venous catheter which terminates with the tip projecting over the expected region of the mid to distal superior vena cava.   2.  No acute cardiopulmonary.      CT-EXTREMITY, LOWER W/O LEFT   Final Result      1.  Left lower extremity edema versus cellulitis. No drainable fluid collections on this noncontrast study.   2.  Enlarged left external iliac and inguinal lymph nodes. They may be reactive to the left lower extremity infection, however, neoplastic nodes are difficult to rule out. Close clinical follow-up is needed.      DX-CHEST-PORTABLE (1 VIEW)   Final Result      No acute cardiopulmonary abnormality.              Assessment/Plan  * Septic shock (HCC)- (present on admission)  Assessment & Plan  Continue on antibiotics IV Unasyn    Bacteremia due to Streptococcus- (present on admission)  Assessment & Plan  4/13/24 blood culture positive for strep group A  4/15  -cultures negative x 2    - I ordered echocardiogram -which did not show any signs of vegetations  -Repeat CT left lower extremity shows small questionable fluid collection but more consistent with cellulitis.    - I reviewed MRI showed ongoing left leg posterior site cellulitis, extensive fluid collection consistent with an abscess in the subcutaneous tissue, nonspecific edema of the hamstring musculature,  no osteomyelitis.  - From exam, patient has leakage on the left popliteal area.  This area may need to be washed out.  We will need to follow-up with general surgery recommendations in the morning.  - I reviewed IDs note, possible 2 weeks of antibiotics.    Continue IV Unasyn  Source control is concerning, general surgery will evaluate patient tomorrow and see if I&D of his left leg is needed.    Hypokalemia- (present on admission)  Assessment & Plan  K 4.1    Hypophosphatemia- (present on admission)  Assessment & Plan  Phos 3.2    Metabolic acidosis- (present on admission)  Assessment & Plan  Bicarb 25    Hyponatremia- (present on admission)  Assessment & Plan  Sodium 136    Dehydration- (present on admission)  Assessment & Plan  Resolved, IV diuresis prn      Acute kidney injury (HCC)- (present on admission)  Assessment & Plan  Resolved, now with volume overload, continue as needed Lasix    Tobacco dependence- (present on admission)  Assessment & Plan  Counseled on admission    Hyperglycemia- (present on admission)  Assessment & Plan  A1c 5.8, prediabetes    Cellulitis of left lower extremity- (present on admission)  Assessment & Plan  WBC 15.5.    - We discussed about potential small incision and drainage by general surgery.  After further discussion, patient stated he is willing to retalk with general surgery tomorrow and have potential surgery.  We will keep patient n.p.o. at midnight.  - I did discuss case with Dr. Calix, who will discussed with Dr. Whitten who will be on tomorrow for surgery.         VTE prophylaxis:    heparin ppx      I have performed a physical exam and reviewed and updated ROS and Plan today (4/25/2024). In review of yesterday's note (4/24/2024), there are no changes except as documented above.      Total time spent 51 minutes.    This included my review of patient's overnight RN notes, face to face interview, physical examination, lab analysis.  Also includes repeat visits with the  patient, and my documented assessments and interventions above.  I have spoken with specialist from general surgery.  In addition, I spoke with entire care team on patient's treatment plan, and DC planning on morning rounds and IDT rounds.

## 2024-04-26 NOTE — CARE PLAN
The patient is Stable - Low risk of patient condition declining or worsening    Shift Goals  Clinical Goals: IV abx, surgery consult, pain management.  Patient Goals: Pain management. Go home.  Family Goals: No family present    Progress made toward(s) clinical / shift goals:    Problem: Pain - Standard  Goal: Alleviation of pain or a reduction in pain to the patient’s comfort goal  Outcome: Progressing  Note: Pain adequately managed per MAR.      Problem: Knowledge Deficit - Standard  Goal: Patient and family/care givers will demonstrate understanding of plan of care, disease process/condition, diagnostic tests and medications  Outcome: Progressing  Note: Discussed POC. Patient verbalized understanding.      Problem: Hemodynamics  Goal: Patient's hemodynamics, fluid balance and neurologic status will be stable or improve  Outcome: Progressing  Note: WBC continue to trend down. Currently WBC at 14.     Problem: Wound/ / Incision Healing  Goal: Patient's wound/surgical incision will decrease in size and heals properly  Outcome: Progressing  Note: Redness and swelling has improved but drainage is copious. Wound consult has been placed.        Patient is not progressing towards the following goals:

## 2024-04-26 NOTE — PROGRESS NOTES
Surgical Progress Note          HPI:  53-year-old male admitted on 2024 with ANDRE and lower extremity cellulitis associated with sepsis    Interval Events:  MRI from 5 days ago demonstrates 1 cm thick elongated abscess along left posterior thigh.  Patient states this area has improved both in appearance and in terms of his pain each day since that time.  He has a small draining fistulous wound at the bottom of his thigh with seropurulent drainage.  White blood cell count decreased every day since MRI performed.  Patient remains on antibiotics, afebrile      ROS  Hemodynamics:  Temp (24hrs), Av.7 °C (98 °F), Min:36.3 °C (97.3 °F), Max:36.9 °C (98.4 °F)  Temperature: 36.4 °C (97.5 °F)  Pulse  Av.4  Min: 57  Max: 112   Blood Pressure: 103/58     Respiratory:    Respiration: 16, Pulse Oximetry: 96 %     Work Of Breathing / Effort: Within Normal Limits  RUL Breath Sounds: Clear, RML Breath Sounds: Clear, RLL Breath Sounds: Clear;Diminished, GRISELDA Breath Sounds: Clear, LLL Breath Sounds: Clear;Diminished  Neuro:  GCS       Fluids:    Intake/Output Summary (Last 24 hours) at 2024 1032  Last data filed at 2024 0650  Gross per 24 hour   Intake 700 ml   Output 1300 ml   Net -600 ml     Weight: 77.5 kg (170 lb 13.7 oz)  Current Diet Order   Procedures    Diet Order Diet: Regular     Physical Exam  Appears well and comfortable  Neck supple  Regular heart rate  Normal respiratory effort  Abdomen soft  Left posterior thigh with mild nontender induration, 1 cm fistulous wound at the medial posterior left distal thigh  Extremities otherwise with normal appearance of perfusion  Skin pink and warm        Labs:  Recent Results (from the past 24 hour(s))   Renal Function Panel    Collection Time: 24  4:30 AM   Result Value Ref Range    Sodium 135 135 - 145 mmol/L    Potassium 4.2 3.6 - 5.5 mmol/L    Chloride 102 96 - 112 mmol/L    Co2 24 20 - 33 mmol/L    Glucose 107 (H) 65 - 99 mg/dL    Creatinine 0.60  0.50 - 1.40 mg/dL    Bun 14 8 - 22 mg/dL    Calcium 8.0 (L) 8.4 - 10.2 mg/dL    Correct Calcium 9.0 8.5 - 10.5 mg/dL    Phosphorus 3.4 2.5 - 4.5 mg/dL    Albumin 2.7 (L) 3.2 - 4.9 g/dL   MAGNESIUM    Collection Time: 04/26/24  4:30 AM   Result Value Ref Range    Magnesium 1.9 1.5 - 2.5 mg/dL   CBC WITHOUT DIFFERENTIAL    Collection Time: 04/26/24  4:30 AM   Result Value Ref Range    WBC 14.0 (H) 4.8 - 10.8 K/uL    RBC 3.77 (L) 4.70 - 6.10 M/uL    Hemoglobin 11.3 (L) 14.0 - 18.0 g/dL    Hematocrit 34.7 (L) 42.0 - 52.0 %    MCV 92.0 81.4 - 97.8 fL    MCH 30.0 27.0 - 33.0 pg    MCHC 32.6 32.3 - 36.5 g/dL    RDW 46.9 35.9 - 50.0 fL    Platelet Count 691 (H) 164 - 446 K/uL    MPV 8.3 (L) 9.0 - 12.9 fL   ESTIMATED GFR    Collection Time: 04/26/24  4:30 AM   Result Value Ref Range    GFR (CKD-EPI) 114 >60 mL/min/1.73 m 2     Medical Decision Making, by Problem:  Active Hospital Problems    Diagnosis     Bacteremia due to Streptococcus [R78.81, B95.5]     Hypophosphatemia [E83.39]     Hypokalemia [E87.6]     Septic shock (HCC) [A41.9, R65.21]     Cellulitis of left lower extremity [L03.116]     Hyperglycemia [R73.9]     Tobacco dependence [F17.200]     Acute kidney injury (HCC) [N17.9]     Dehydration [E86.0]     Hyponatremia [E87.1]     Metabolic acidosis [E87.20]      Plan:  53-year-old male with left thigh cellulitis and abscess identified by prior MRI  1.  Appreciate medical care  2.  Recommend continuation of IV broad-spectrum antibiotics until white blood cell count normalizes, then completion of p.o. antibiotic course as outpatient  3.  Diet as tolerated  4.  No plans for surgical intervention at this time  5.  Would recommend wound nurse consultation with attempt at packing left posterior distal thigh wound with iodoform quarter-inch packing strip, cover with dry gauze and change as needed  6.  General surgery will follow    Quality Measures:  Quality-Core Measures    Discussed patient condition with Patient

## 2024-04-26 NOTE — PROGRESS NOTES
Telemetry Shift Summary    Rhythm SR  HR Range 71-79  Ectopy R-PVC  Measurements 0.16/0.08/0.34        Normal Values  Rhythm SR  HR Range    Measurements 0.12-0.20 / 0.06-0.10  / 0.30-0.52

## 2024-04-27 LAB
BASOPHILS # BLD AUTO: 0.7 % (ref 0–1.8)
BASOPHILS # BLD: 0.09 K/UL (ref 0–0.12)
EOSINOPHIL # BLD AUTO: 0.07 K/UL (ref 0–0.51)
EOSINOPHIL NFR BLD: 0.6 % (ref 0–6.9)
ERYTHROCYTE [DISTWIDTH] IN BLOOD BY AUTOMATED COUNT: 47.1 FL (ref 35.9–50)
HCT VFR BLD AUTO: 34.2 % (ref 42–52)
HGB BLD-MCNC: 11.1 G/DL (ref 14–18)
IMM GRANULOCYTES # BLD AUTO: 0.12 K/UL (ref 0–0.11)
IMM GRANULOCYTES NFR BLD AUTO: 1 % (ref 0–0.9)
LYMPHOCYTES # BLD AUTO: 1.41 K/UL (ref 1–4.8)
LYMPHOCYTES NFR BLD: 11.6 % (ref 22–41)
MCH RBC QN AUTO: 29.8 PG (ref 27–33)
MCHC RBC AUTO-ENTMCNC: 32.5 G/DL (ref 32.3–36.5)
MCV RBC AUTO: 91.9 FL (ref 81.4–97.8)
MONOCYTES # BLD AUTO: 0.86 K/UL (ref 0–0.85)
MONOCYTES NFR BLD AUTO: 7.1 % (ref 0–13.4)
NEUTROPHILS # BLD AUTO: 9.61 K/UL (ref 1.82–7.42)
NEUTROPHILS NFR BLD: 79 % (ref 44–72)
NRBC # BLD AUTO: 0 K/UL
NRBC BLD-RTO: 0 /100 WBC (ref 0–0.2)
PLATELET # BLD AUTO: 657 K/UL (ref 164–446)
PMV BLD AUTO: 8.2 FL (ref 9–12.9)
RBC # BLD AUTO: 3.72 M/UL (ref 4.7–6.1)
WBC # BLD AUTO: 12.2 K/UL (ref 4.8–10.8)

## 2024-04-27 PROCEDURE — 700111 HCHG RX REV CODE 636 W/ 250 OVERRIDE (IP): Mod: JZ | Performed by: INTERNAL MEDICINE

## 2024-04-27 PROCEDURE — 700105 HCHG RX REV CODE 258: Performed by: INTERNAL MEDICINE

## 2024-04-27 PROCEDURE — 700102 HCHG RX REV CODE 250 W/ 637 OVERRIDE(OP): Performed by: HOSPITALIST

## 2024-04-27 PROCEDURE — 770006 HCHG ROOM/CARE - MED/SURG/GYN SEMI*

## 2024-04-27 PROCEDURE — 99232 SBSQ HOSP IP/OBS MODERATE 35: CPT | Performed by: INTERNAL MEDICINE

## 2024-04-27 PROCEDURE — A9270 NON-COVERED ITEM OR SERVICE: HCPCS | Performed by: HOSPITALIST

## 2024-04-27 PROCEDURE — A9270 NON-COVERED ITEM OR SERVICE: HCPCS | Performed by: INTERNAL MEDICINE

## 2024-04-27 PROCEDURE — 85025 COMPLETE CBC W/AUTO DIFF WBC: CPT

## 2024-04-27 PROCEDURE — 97602 WOUND(S) CARE NON-SELECTIVE: CPT

## 2024-04-27 PROCEDURE — 700102 HCHG RX REV CODE 250 W/ 637 OVERRIDE(OP): Performed by: INTERNAL MEDICINE

## 2024-04-27 PROCEDURE — 94760 N-INVAS EAR/PLS OXIMETRY 1: CPT

## 2024-04-27 RX ORDER — GABAPENTIN 100 MG/1
200 CAPSULE ORAL 3 TIMES DAILY
Status: DISCONTINUED | OUTPATIENT
Start: 2024-04-27 | End: 2024-05-01 | Stop reason: HOSPADM

## 2024-04-27 RX ADMIN — OXYCODONE HYDROCHLORIDE 10 MG: 10 TABLET ORAL at 21:30

## 2024-04-27 RX ADMIN — OXYCODONE HYDROCHLORIDE 10 MG: 10 TABLET ORAL at 18:01

## 2024-04-27 RX ADMIN — AMPICILLIN AND SULBACTAM 3 G: 1; 2 INJECTION, POWDER, FOR SOLUTION INTRAMUSCULAR; INTRAVENOUS at 11:27

## 2024-04-27 RX ADMIN — OXYCODONE HYDROCHLORIDE 10 MG: 10 TABLET ORAL at 14:19

## 2024-04-27 RX ADMIN — OXYCODONE HYDROCHLORIDE 10 MG: 10 TABLET ORAL at 04:41

## 2024-04-27 RX ADMIN — OXYCODONE HYDROCHLORIDE 10 MG: 10 TABLET ORAL at 08:26

## 2024-04-27 RX ADMIN — AMPICILLIN AND SULBACTAM 3 G: 1; 2 INJECTION, POWDER, FOR SOLUTION INTRAMUSCULAR; INTRAVENOUS at 17:22

## 2024-04-27 RX ADMIN — RIVAROXABAN 10 MG: 10 TABLET, FILM COATED ORAL at 17:22

## 2024-04-27 RX ADMIN — AMPICILLIN AND SULBACTAM 3 G: 1; 2 INJECTION, POWDER, FOR SOLUTION INTRAMUSCULAR; INTRAVENOUS at 23:32

## 2024-04-27 RX ADMIN — NICOTINE 14 MG: 14 PATCH, EXTENDED RELEASE TRANSDERMAL at 04:41

## 2024-04-27 RX ADMIN — AMPICILLIN AND SULBACTAM 3 G: 1; 2 INJECTION, POWDER, FOR SOLUTION INTRAMUSCULAR; INTRAVENOUS at 04:42

## 2024-04-27 RX ADMIN — GABAPENTIN 200 MG: 100 CAPSULE ORAL at 17:21

## 2024-04-27 ASSESSMENT — PAIN DESCRIPTION - PAIN TYPE
TYPE: ACUTE PAIN

## 2024-04-27 ASSESSMENT — ENCOUNTER SYMPTOMS
FEVER: 0
CHILLS: 0

## 2024-04-27 ASSESSMENT — FIBROSIS 4 INDEX: FIB4 SCORE: 0.49

## 2024-04-27 NOTE — PROGRESS NOTES
Telemetry Shift Summary     Rhythm SR  HR Range 77-83  Ectopy [facility]  Measurements  .16/.08/.34  Per strip printed 0400     Normal Values  Rhythm SR  HR Range    Measurements 0.12-0.20 / 0.06-0.10  / 0.30-0.52

## 2024-04-27 NOTE — WOUND TEAM
"[facility]  Inpatient Services  Initial Wound and Skin Care Evaluation    Admission Date: 4/13/2024     Last order of IP CONSULT TO WOUND CARE was found on 4/26/2024 from Hospital Encounter on 4/13/2024     HPI, PMH, SH: Reviewed    No past surgical history on file.  Social History     Tobacco Use    Smoking status: Every Day     Current packs/day: 1.00     Types: Cigarettes    Smokeless tobacco: Never   Substance Use Topics    Alcohol use: Yes     Comment: rarely     Chief Complaint   Patient presents with    Leg Swelling     L leg x1 week. Denies CP or SOB. Denies wound or rash. Endorses nausea.    Body Aches     Reports Monday, feeling that he had h/a, body aches, nausea. Reports improvement Tuesday but states \"then I couldn't hold food down\". Reports N/V persists.      Diagnosis: Septic shock (HCC) [A41.9, R65.21]  Shock (HCC) [R57.9]    Unit where seen by Wound Team: 3314/02     WOUND CONSULT RELATED TO:  L posterior knee     WOUND TEAM PLAN OF CARE - Frequency of Follow-up:   Nursing to follow dressing orders written for wound care. Contact wound team if area fails to progress, deteriorates or with any questions/concerns if something comes up before next scheduled follow up (See below as to whether wound is following and frequency of wound follow up)   Not following, consult as needed  -      WOUND HISTORY:     Admitted for lower extremity cellulitis. Dr. Whitten consulted, no sx at this time. Per MD, patient with fistulous wound to posterior left thigh        WOUND ASSESSMENT/LDA  Wound 04/13/24 Full Thickness Wound Knee Posterior Left (Active)   Date First Assessed: 04/13/24   Present on Original Admission: Yes  Primary Wound Type: Full Thickness Wound  Location: Knee  Wound Orientation: Posterior  Laterality: Left      Assessments 4/27/2024  2:23 PM   Wound Image      Site Assessment Red;Yellow;Drainage   Periwound Assessment Intact   Margins Defined edges;Unattached edges   Closure Open to " air   Drainage Amount Small   Drainage Description Yellow;Serous   Treatments Cleansed;Site care   Wound Cleansing Normal Saline Irrigation   Periwound Protectant Skin Protectant Wipes to Periwound   Dressing Status Clean;Dry;Intact   Dressing Changed New   Dressing Cleansing/Solutions Not Applicable   Dressing Options Hydrofiber Silver;Bandaid   Dressing Change/Treatment Frequency Every 48 hrs, and As Needed   NEXT Dressing Change/Treatment Date 04/29/24   Wound Team Following Not following   Non-staged Wound Description Full thickness   Wound Length (cm) 1 cm   Wound Width (cm) 1.5 cm   Wound Depth (cm) 0.8 cm   Wound Surface Area (cm^2) 1.5 cm^2   Wound Volume (cm^3) 1.2 cm^3   Shape circular   Wound Odor None   WOUND NURSE ONLY - Time Spent with Patient (mins) 30        Vascular:    TOM:   No results found.    Lab Values:    Lab Results   Component Value Date/Time    WBC 12.2 (H) 04/27/2024 09:05 AM    RBC 3.72 (L) 04/27/2024 09:05 AM    HEMOGLOBIN 11.1 (L) 04/27/2024 09:05 AM    HEMATOCRIT 34.2 (L) 04/27/2024 09:05 AM    HBA1C 5.8 (H) 04/13/2024 04:19 PM         Culture Results show:  No results found for this or any previous visit (from the past 720 hour(s)).    Pain Level/Medicated:  None, Tolerated without pain medication       INTERVENTIONS BY WOUND TEAM:  Chart and images reviewed. Discussed with bedside RN. All areas of concern (based on picture review, LDA review and discussion with bedside RN) have been thoroughly assessed. Documentation of areas based on significant findings. This RN in to assess patient. Performed standard wound care which includes appropriate positioning, dressing removal and non-selective debridement. Pictures and measurements obtained weekly if/when required.    Wound:  L posterior knee  Preparation for Dressing removal: Removed without difficulty  Cleansed/Non-selectively Debrided with:  Normal Saline and Gauze  Jacqui wound: Cleansed with Normal Saline, Prepped with No  Sting  Primary Dressing:  AqAg  Secondary (Outer) Dressing: bandaid    Advanced Wound Care Discharge Planning  Number of Clinicians necessary to complete wound care: 1  Is patient requiring IV pain medications for dressing changes:  No   Length of time for dressing change 20 min. (This does not include chart review, pre-medication time, set up, clean up or time spent charting.)    Interdisciplinary consultation: Patient, Bedside RN, N/A.  Pressure injury and staging reviewed with N/A.    EVALUATION / RATIONALE FOR TREATMENT:     Date:  04/27/24  Wound Status:  Initial evaluation    Patient with draining full thickness wound to posterior left knee. Previous dressing with strike through drainage. Following dressing removal, wound with small amount of yellow/serous drainage. Wound depth also minimal. Aquacel Ag Hydrofiber applied to manage bioburden, absorb exudate, and maintain a moist wound environment without laterally wicking exudate therefore reducing christine-wound maceration. Left leg erythema also improved.          Goals: Steady decrease in wound area and depth weekly.    NURSING PLAN OF CARE ORDERS:  Dressing changes: See Dressing Care orders    NUTRITION RECOMMENDATIONS   Wound Team Recommendations:  N/A    DIET ORDERS (From admission to next 24h)       Start     Ordered    04/26/24 1025  Diet Order Diet: Regular  ALL MEALS        Question:  Diet:  Answer:  Regular    04/26/24 1024                    PREVENTATIVE INTERVENTIONS:    Q shift Narciso - performed per nursing policy  Q shift pressure point assessments - performed per nursing policy    Surface/Positioning  Reposition q 2 hours - Currently in Place    Offloading/Redistribution  N/A    Anticipated discharge plans:  TBD        Vac Discharge Needs:  Vac Discharge plan is purely a recommendation from wound team and not a requirement for discharge unless otherwise stated by physician.  Not Applicable Pt not on a wound vac

## 2024-04-27 NOTE — PROGRESS NOTES
E-advice message was sent.   Telemetry Shift Summary    Rhythm SR  HR Range 77-84  Ectopy R-PVC  Measurements 0.14/0.10/0.36        Normal Values  Rhythm SR  HR Range    Measurements 0.12-0.20 / 0.06-0.10  / 0.30-0.52

## 2024-04-27 NOTE — CARE PLAN
Problem: Pain - Standard  Goal: Alleviation of pain or a reduction in pain to the patient’s comfort goal  Outcome: Progressing     Problem: Fall Risk  Goal: Patient will remain free from falls  Outcome: Progressing     Problem: Wound/ / Incision Healing  Goal: Patient's wound/surgical incision will decrease in size and heals properly  Outcome: Progressing   The patient is Stable - Low risk of patient condition declining or worsening    Shift Goals  Clinical Goals: IV Abx,pain control  Patient Goals: pain control  Family Goals: No family present    Progress made toward(s) clinical / shift goals:  Educated on fall prevention measures such as bed on its lowest position,call light within reach,bed alarm on,pt verbalized understanding.Updated on plan of care.    Patient is not progressing towards the following goals:

## 2024-04-27 NOTE — PROGRESS NOTES
Hospital Medicine Daily Progress Note    Date of Service  4/27/2024    Chief Complaint  Oneal Tillman is a 53 y.o. male admitted 4/13/2024 with septic shock    Hospital Course  Patient is a 53-year-old male with diabetes and tobacco dependence who presented with leg swelling of the left leg for 1 week and body aches.  Questionable spider bite on the left leg after he changed his pants and then 2 days later started having worsening pain redness and swelling.  Also noticed chills palpitations weakness.  Tachycardic with a heart rate of 104 and low blood pressure.  His initial lactic acid was 4.3 and white count was 15.  Patient was initially admitted and given fluid resuscitation and started on IV antibiotics.  Blood cultures came back positive for group B strep and infectious disease recommended in addition to continuation of Zyvox to add pen G.  He is having worsening left leg erythema and ascending rash with blistering lesions.  Repeat CT left leg pending.    Interval Problem Update  Patient stated he was doing better today, still having a substantial amount of drainage.  Patient stated when he walks, it causes him significant pain that is pressure, shooting pains throughout his left leg.  - WBC 12.2.  -I discussed with ID, Dr. Santo will help make recommendations once WBC normalizes and potential p.o. antibiotics.    I have discussed this patient's plan of care and discharge plan at IDT rounds today with Case Management, Nursing, Nursing leadership, and other members of the IDT team.    Consultants/Specialty  general surgery and infectious disease    Code Status  Full Code    Disposition  The patient is not medically cleared for discharge to home or a post-acute facility.  Anticipate discharge to: home with close outpatient follow-up    I have placed the appropriate orders for post-discharge needs.    Review of Systems  Review of Systems   Constitutional:  Negative for chills and fever.        Physical  Exam  Temp:  [36.3 °C (97.4 °F)-36.8 °C (98.2 °F)] 36.3 °C (97.4 °F)  Pulse:  [78-87] 87  Resp:  [18] 18  BP: (104-117)/(61-67) 117/67  SpO2:  [92 %-97 %] 97 %    Physical Exam  Vitals and nursing note reviewed.   Constitutional:       General: He is not in acute distress.     Appearance: He is not diaphoretic.   HENT:      Mouth/Throat:      Mouth: Mucous membranes are dry.      Pharynx: No oropharyngeal exudate.   Cardiovascular:      Rate and Rhythm: Normal rate and regular rhythm.      Pulses: Normal pulses.      Heart sounds: Normal heart sounds. No murmur heard.  Pulmonary:      Effort: Pulmonary effort is normal. No respiratory distress.      Breath sounds: Normal breath sounds. No wheezing or rales.   Abdominal:      General: Bowel sounds are normal. There is no distension.      Tenderness: There is no abdominal tenderness.   Musculoskeletal:         General: Tenderness (Left leg, thigh) present. No swelling. Normal range of motion.   Skin:     General: Skin is warm.      Capillary Refill: Capillary refill takes less than 2 seconds.      Coloration: Skin is not jaundiced or pale.      Findings: Lesion (Left popliteal region, purulent drainage noted) present.   Neurological:      General: No focal deficit present.      Mental Status: He is alert and oriented to person, place, and time. Mental status is at baseline.      Motor: No weakness.   Psychiatric:         Mood and Affect: Mood normal.         Behavior: Behavior normal.         Thought Content: Thought content normal.         Judgment: Judgment normal.         Fluids    Intake/Output Summary (Last 24 hours) at 4/27/2024 1529  Last data filed at 4/27/2024 1000  Gross per 24 hour   Intake 360 ml   Output 1040 ml   Net -680 ml       Laboratory  Recent Labs     04/25/24  1005 04/26/24  0430 04/27/24  0905   WBC 15.5* 14.0* 12.2*   RBC 3.83* 3.77* 3.72*   HEMOGLOBIN 11.5* 11.3* 11.1*   HEMATOCRIT 35.1* 34.7* 34.2*   MCV 91.6 92.0 91.9   MCH 30.0 30.0 29.8    MCHC 32.8 32.6 32.5   RDW 47.2 46.9 47.1   PLATELETCT 701* 691* 657*   MPV 8.4* 8.3* 8.2*     Recent Labs     04/25/24  1005 04/26/24  0430   SODIUM 136 135   POTASSIUM 4.1 4.2   CHLORIDE 103 102   CO2 25 24   GLUCOSE 149* 107*   BUN 12 14   CREATININE 0.68 0.60   CALCIUM 8.1* 8.0*     Recent Labs     04/25/24  1005   APTT 25.3   INR 1.02               Imaging  MR-FEMUR-WITH & W/O LEFT   Final Result      1.  Left lateral/posterior site cellulitis      2.  Rim-enhancing fluid collection consistent with abscess within the subcutaneous tissues of the left thigh extending from the 1:00-2:00 position to the 7:00 position, measuring 12 mm in thickness, 16.7 cm anterior to posterior, and craniocaudal extent    extending from the level of the left greater trochanter to the distal thigh measuring approximately 40 cm      3.   Nonspecific edema within the hamstring musculature      4.  Negative for osteomyelitis      US-ABDOMEN LTD (SOFT TISSUE)   Final Result         1. No discrete fluid collection identified.      US-EXTREMITY NON VASCULAR UNILATERAL LEFT   Final Result      Edema without a discrete fluid collection.      CT-EXTREMITY, LOWER WITH LEFT   Final Result      1.  Subcutaneous inflammatory changes about the left lower leg and thigh suspicious for cellulitis.      2.  Curvilinear fluid attenuation adjacent to the musculature of the lateral aspect of the left thigh measuring 1.3 cm in greatest diameter suspicious for subcutaneous seroma less likely early abscess formation.      EC-ECHOCARDIOGRAM COMPLETE W/O CONT   Final Result      US-EXTREMITY VENOUS LOWER UNILAT LEFT   Final Result         No evidence of acute deep venous thrombosis in the visualized venous segments of the left lower extremity.         DX-CHEST-FOR LINE PLACEMENT Perform procedure in: Patient's Room   Final Result      1.  Interval insertion of a central venous catheter which terminates with the tip projecting over the expected region of the  mid to distal superior vena cava.   2.  No acute cardiopulmonary.      CT-EXTREMITY, LOWER W/O LEFT   Final Result      1.  Left lower extremity edema versus cellulitis. No drainable fluid collections on this noncontrast study.   2.  Enlarged left external iliac and inguinal lymph nodes. They may be reactive to the left lower extremity infection, however, neoplastic nodes are difficult to rule out. Close clinical follow-up is needed.      DX-CHEST-PORTABLE (1 VIEW)   Final Result      No acute cardiopulmonary abnormality.              Assessment/Plan  * Septic shock (HCC)- (present on admission)  Assessment & Plan  Continue on IV Unasyn    Bacteremia due to Streptococcus- (present on admission)  Assessment & Plan  4/13/24 blood culture positive for strep group A  4/15  -cultures negative x 2  Echo did not show vegetations  Continue on IV Unasyn  I discussed with ID, potential plans for outpatient antibiotics to be discussed soon.    Hypokalemia- (present on admission)  Assessment & Plan  Potassium 4.2    Hypophosphatemia- (present on admission)  Assessment & Plan  Phos 3.4    Metabolic acidosis- (present on admission)  Assessment & Plan  Bicarb 24    Hyponatremia- (present on admission)  Assessment & Plan  Sodium 135    Dehydration- (present on admission)  Assessment & Plan  Resolved, IV diuresis prn      Acute kidney injury (HCC)- (present on admission)  Assessment & Plan  Resolved, now with volume overload, continue as needed Lasix    Tobacco dependence- (present on admission)  Assessment & Plan  Counseled on admission    Hyperglycemia- (present on admission)  Assessment & Plan  A1c 5.8, prediabetes    Cellulitis of left lower extremity- (present on admission)  Assessment & Plan  I discussed with surgery, continue to monitor at this time and complete antibiotics.  No surgery.  Continue on IV Unasyn  Wound care consult pending  WBC 12.2         VTE prophylaxis:    Xarelto 10mg daily as prophylaxis      I have  performed a physical exam and reviewed and updated ROS and Plan today (4/27/2024). In review of yesterday's note (4/26/2024), there are no changes except as documented above.      Total time spent 51 minutes. I spent greater than 50% of the time for patient care, including unit/floor time, multiple face-to-face encounters with the patient, counseling on treatment plan and discussion with bedside RN.  Further, I spent time on my own review of patient's overnight events, RN notes, imaging and lab analysis, and developing my assessment and plan above.  In addition, working with , social workers, and Charge RN on case coordination on this date.  I discussed case with general surgery and infectious disease today.

## 2024-04-27 NOTE — CARE PLAN
The patient is Stable - Low risk of patient condition declining or worsening    Patient states that pain has diminished. Swelling and erythema are markedly improved.     Shift Goals  Clinical Goals: IV abx, pain management, wound care.  Patient Goals: Pain management.  Family Goals: No family present    Progress made toward(s) clinical / shift goals:    Problem: Knowledge Deficit - Standard  Goal: Patient and family/care givers will demonstrate understanding of plan of care, disease process/condition, diagnostic tests and medications  Outcome: Progressing  Note: POC discussed. Patient verbalized understanding.      Problem: Hemodynamics  Goal: Patient's hemodynamics, fluid balance and neurologic status will be stable or improve  Outcome: Progressing  Note: WBC down to 12.2 today. Per MD, discharge on PO abx when WBC <11       Patient is not progressing towards the following goals:

## 2024-04-27 NOTE — PROGRESS NOTES
Surgical Progress Note          HPI:  53-year-old male admitted on 2024 with ANDRE and lower extremity cellulitis associated with sepsis    Interval Events:  Patient remains afebrile.  He states his left thigh continues to feel much better.  He has a dry dressing over his left distal thigh wound today.  No labs are available at this time      ROS  Hemodynamics:  Temp (24hrs), Av.5 °C (97.7 °F), Min:36.3 °C (97.3 °F), Max:36.8 °C (98.2 °F)  Temperature: 36.6 °C (97.9 °F)  Pulse  Av.1  Min: 57  Max: 112   Blood Pressure: 112/61     Respiratory:    Respiration: 18, Pulse Oximetry: 95 %     Work Of Breathing / Effort: Within Normal Limits  RUL Breath Sounds: Clear, RML Breath Sounds: Clear, RLL Breath Sounds: Clear;Diminished, GRISELDA Breath Sounds: Clear, LLL Breath Sounds: Clear;Diminished  Neuro:  GCS       Fluids:    Intake/Output Summary (Last 24 hours) at 2024 1032  Last data filed at 2024 0650  Gross per 24 hour   Intake 700 ml   Output 1300 ml   Net -600 ml     Weight: 74.3 kg (163 lb 12.8 oz)  Current Diet Order   Procedures    Diet Order Diet: Regular     Physical Exam  Appears well and comfortable  Neck supple  Regular heart rate  Normal respiratory effort  Abdomen soft  Left posterior thigh with mild nontender induration, 1 cm fistulous wound at the medial posterior left distal thigh appears clean, scant yellow drainage  Extremities otherwise with normal appearance of perfusion  Skin pink and warm        Labs:  No results found for this or any previous visit (from the past 24 hour(s)).    Medical Decision Making, by Problem:  Active Hospital Problems    Diagnosis     Bacteremia due to Streptococcus [R78.81, B95.5]     Hypophosphatemia [E83.39]     Hypokalemia [E87.6]     Septic shock (HCC) [A41.9, R65.21]     Cellulitis of left lower extremity [L03.116]     Hyperglycemia [R73.9]     Tobacco dependence [F17.200]     Acute kidney injury (HCC) [N17.9]     Dehydration [E86.0]      Hyponatremia [E87.1]     Metabolic acidosis [E87.20]      Plan:  53-year-old male with left thigh cellulitis and abscess identified by prior MRI  1.  Appreciate medical care  2.  Recommend continuation of IV broad-spectrum antibiotics until white blood cell count normalizes, then completion of p.o. antibiotic course as outpatient  3.  Diet as tolerated  4.  No plans for surgical intervention at this time  5.  Continue dry dressing as needed to left thigh  6.  General surgery will follow    Quality Measures:  Quality-Core Measures    Discussed patient condition with Patient

## 2024-04-28 LAB
ALBUMIN SERPL BCP-MCNC: 2.9 G/DL (ref 3.2–4.9)
BUN SERPL-MCNC: 13 MG/DL (ref 8–22)
CALCIUM ALBUM COR SERPL-MCNC: 9.2 MG/DL (ref 8.5–10.5)
CALCIUM SERPL-MCNC: 8.3 MG/DL (ref 8.4–10.2)
CHLORIDE SERPL-SCNC: 102 MMOL/L (ref 96–112)
CO2 SERPL-SCNC: 26 MMOL/L (ref 20–33)
CREAT SERPL-MCNC: 0.6 MG/DL (ref 0.5–1.4)
ERYTHROCYTE [DISTWIDTH] IN BLOOD BY AUTOMATED COUNT: 47.4 FL (ref 35.9–50)
GFR SERPLBLD CREATININE-BSD FMLA CKD-EPI: 114 ML/MIN/1.73 M 2
GLUCOSE SERPL-MCNC: 97 MG/DL (ref 65–99)
HCT VFR BLD AUTO: 34.4 % (ref 42–52)
HGB BLD-MCNC: 11 G/DL (ref 14–18)
MAGNESIUM SERPL-MCNC: 1.9 MG/DL (ref 1.5–2.5)
MCH RBC QN AUTO: 30 PG (ref 27–33)
MCHC RBC AUTO-ENTMCNC: 32 G/DL (ref 32.3–36.5)
MCV RBC AUTO: 93.7 FL (ref 81.4–97.8)
PHOSPHATE SERPL-MCNC: 3.1 MG/DL (ref 2.5–4.5)
PLATELET # BLD AUTO: 697 K/UL (ref 164–446)
PMV BLD AUTO: 8.5 FL (ref 9–12.9)
POTASSIUM SERPL-SCNC: 4 MMOL/L (ref 3.6–5.5)
RBC # BLD AUTO: 3.67 M/UL (ref 4.7–6.1)
SODIUM SERPL-SCNC: 138 MMOL/L (ref 135–145)
WBC # BLD AUTO: 10.5 K/UL (ref 4.8–10.8)

## 2024-04-28 PROCEDURE — 83735 ASSAY OF MAGNESIUM: CPT

## 2024-04-28 PROCEDURE — 80069 RENAL FUNCTION PANEL: CPT

## 2024-04-28 PROCEDURE — 700102 HCHG RX REV CODE 250 W/ 637 OVERRIDE(OP): Performed by: HOSPITALIST

## 2024-04-28 PROCEDURE — A9270 NON-COVERED ITEM OR SERVICE: HCPCS | Performed by: HOSPITALIST

## 2024-04-28 PROCEDURE — 85027 COMPLETE CBC AUTOMATED: CPT

## 2024-04-28 PROCEDURE — 700102 HCHG RX REV CODE 250 W/ 637 OVERRIDE(OP): Performed by: INTERNAL MEDICINE

## 2024-04-28 PROCEDURE — 99233 SBSQ HOSP IP/OBS HIGH 50: CPT | Performed by: INTERNAL MEDICINE

## 2024-04-28 PROCEDURE — 700111 HCHG RX REV CODE 636 W/ 250 OVERRIDE (IP): Mod: JZ | Performed by: INTERNAL MEDICINE

## 2024-04-28 PROCEDURE — 94760 N-INVAS EAR/PLS OXIMETRY 1: CPT

## 2024-04-28 PROCEDURE — 99232 SBSQ HOSP IP/OBS MODERATE 35: CPT | Performed by: INTERNAL MEDICINE

## 2024-04-28 PROCEDURE — 770006 HCHG ROOM/CARE - MED/SURG/GYN SEMI*

## 2024-04-28 PROCEDURE — 700105 HCHG RX REV CODE 258: Performed by: INTERNAL MEDICINE

## 2024-04-28 PROCEDURE — A9270 NON-COVERED ITEM OR SERVICE: HCPCS | Performed by: INTERNAL MEDICINE

## 2024-04-28 RX ADMIN — GABAPENTIN 200 MG: 100 CAPSULE ORAL at 11:32

## 2024-04-28 RX ADMIN — RIVAROXABAN 10 MG: 10 TABLET, FILM COATED ORAL at 17:10

## 2024-04-28 RX ADMIN — GABAPENTIN 200 MG: 100 CAPSULE ORAL at 17:10

## 2024-04-28 RX ADMIN — OXYCODONE HYDROCHLORIDE 10 MG: 10 TABLET ORAL at 03:38

## 2024-04-28 RX ADMIN — OXYCODONE HYDROCHLORIDE 10 MG: 10 TABLET ORAL at 23:12

## 2024-04-28 RX ADMIN — AMPICILLIN AND SULBACTAM 3 G: 1; 2 INJECTION, POWDER, FOR SOLUTION INTRAMUSCULAR; INTRAVENOUS at 23:14

## 2024-04-28 RX ADMIN — AMPICILLIN AND SULBACTAM 3 G: 1; 2 INJECTION, POWDER, FOR SOLUTION INTRAMUSCULAR; INTRAVENOUS at 17:11

## 2024-04-28 RX ADMIN — AMPICILLIN AND SULBACTAM 3 G: 1; 2 INJECTION, POWDER, FOR SOLUTION INTRAMUSCULAR; INTRAVENOUS at 11:33

## 2024-04-28 RX ADMIN — AMPICILLIN AND SULBACTAM 3 G: 1; 2 INJECTION, POWDER, FOR SOLUTION INTRAMUSCULAR; INTRAVENOUS at 05:57

## 2024-04-28 RX ADMIN — SENNOSIDES AND DOCUSATE SODIUM 2 TABLET: 50; 8.6 TABLET ORAL at 05:55

## 2024-04-28 RX ADMIN — NICOTINE 14 MG: 14 PATCH, EXTENDED RELEASE TRANSDERMAL at 05:55

## 2024-04-28 RX ADMIN — OXYCODONE HYDROCHLORIDE 5 MG: 5 TABLET ORAL at 11:33

## 2024-04-28 RX ADMIN — GABAPENTIN 200 MG: 100 CAPSULE ORAL at 05:55

## 2024-04-28 ASSESSMENT — ENCOUNTER SYMPTOMS
MYALGIAS: 1
WEAKNESS: 0
VOMITING: 0
COUGH: 0
FEVER: 0
DIARRHEA: 0
ABDOMINAL PAIN: 0
CONSTIPATION: 0
NAUSEA: 0
CHILLS: 0
NERVOUS/ANXIOUS: 0

## 2024-04-28 ASSESSMENT — FIBROSIS 4 INDEX: FIB4 SCORE: 0.49

## 2024-04-28 ASSESSMENT — PAIN DESCRIPTION - PAIN TYPE: TYPE: ACUTE PAIN

## 2024-04-28 NOTE — PROGRESS NOTES
Received patient report. Assumed patient care. Patient presents alert and oriented x 4. Patient in no acute distress. Respirations regular non labored on room air. Assessment revealed no changes. Wound is healing evident by decrease swelling, erythema, and pain. Dressing clean, dry, intact. Plan of care discussed with patient. All questions answered to patient satisfaction. Patient left with safety measure in place, bed in low position, and call light within reach.

## 2024-04-28 NOTE — CARE PLAN
Problem: Pain - Standard  Goal: Alleviation of pain or a reduction in pain to the patient’s comfort goal  Outcome: Progressing     Problem: Fall Risk  Goal: Patient will remain free from falls  Outcome: Progressing     Problem: Wound/ / Incision Healing  Goal: Patient's wound/surgical incision will decrease in size and heals properly  Outcome: Progressing   The patient is Stable - Low risk of patient condition declining or worsening    Shift Goals  Clinical Goals: IV Abx,pain control  Patient Goals: pain control  Family Goals: No family present    Progress made toward(s) clinical / shift goals:  Educated on fall prevention measures,call light within reach,updated on plan of care.    Patient is not progressing towards the following goals:

## 2024-04-28 NOTE — CARE PLAN
The patient is Stable - Low risk of patient condition declining or worsening    Shift Goals  Clinical Goals: IV abx, pain management  Patient Goals: Go home  Family Goals: No family present    Progress made toward(s) clinical / shift goals:    Problem: Pain - Standard  Goal: Alleviation of pain or a reduction in pain to the patient’s comfort goal  Outcome: Progressing  Note: Pt states that pain is improving.      Problem: Knowledge Deficit - Standard  Goal: Patient and family/care givers will demonstrate understanding of plan of care, disease process/condition, diagnostic tests and medications  Outcome: Progressing  Note: POC discussed - anticipating discharge - awaiting MD to round.      Problem: Hemodynamics  Goal: Patient's hemodynamics, fluid balance and neurologic status will be stable or improve  Outcome: Progressing  Note: WBC continue to improved. Trending down to 10.5 now.      Problem: Physical Regulation  Goal: Signs and symptoms of infection will decrease  Outcome: Progressing  Note: Swelling, erythema, and pain have improved.        Patient is not progressing towards the following goals:

## 2024-04-28 NOTE — PROGRESS NOTES
Infectious Disease Progress Note    Author: Ashley Santo M.D. Date & Time of service: 2024  8:01 AM      Chief Complaint:  Follow-up for cellulitis     Interval History:   patient remains afebrile and white count slightly down to 22.3, CT scan with curvilinear fluid collection lateral thigh but no obvious air   Tmax 99.6, erythema continue to track up the lateral abdominal wall and white count continuing to trend up, 33.5 today.  Cultures as below   patient afebrile, white count continues to trend up to 38.2, however the erythema tracking of the lateral abdominal wall has noticeably improved and receded.  The swelling of lateral proximal thigh has worsened   patient remains afebrile, erythema of the lateral abdominal wall continues to recede and has nearly completely resolved, and white count finally decreased today, down to 33.4, creatinine 0.55    Review of Systems:  Review of Systems   Respiratory:  Negative for cough.    Gastrointestinal:  Negative for abdominal pain, constipation, diarrhea, nausea and vomiting.   Genitourinary:  Negative for dysuria.   Musculoskeletal:  Positive for myalgias.   Neurological:  Negative for weakness.   Psychiatric/Behavioral:  The patient is not nervous/anxious.        Hemodynamics:  Temp (24hrs), Av.6 °C (97.8 °F), Min:36.3 °C (97.4 °F), Max:36.8 °C (98.2 °F)  Temperature: 36.6 °C (97.8 °F)  Pulse  Av.7  Min: 57  Max: 112   Blood Pressure: 106/58       Physical Exam:  Physical Exam  Cardiovascular:      Rate and Rhythm: Normal rate and regular rhythm.      Heart sounds: Normal heart sounds.   Pulmonary:      Effort: Pulmonary effort is normal.      Breath sounds: Normal breath sounds.   Abdominal:      General: Abdomen is flat. Bowel sounds are normal.      Palpations: Abdomen is soft.   Musculoskeletal:      Comments: Left leg with some mild erythema, induration, wound in popliteal area with significant ongoing purulent drainage   Skin:      General: Skin is warm and dry.   Neurological:      General: No focal deficit present.      Mental Status: He is oriented to person, place, and time.   Psychiatric:         Mood and Affect: Mood normal.         Behavior: Behavior normal.         Meds:    Current Facility-Administered Medications:     gabapentin    rivaroxaban    ampicillin-sulbactam (UNASYN) IV    acetaminophen    senna-docusate **AND** polyethylene glycol/lytes    Notify provider if pain remains uncontrolled **AND** Use the Numeric Rating Scale (NRS), Green-Baker Faces (WBF), or FLACC on regular floors and Critical-Care Pain Observation Tool (CPOT) on ICUs/Trauma to assess pain **AND** Pulse Ox **AND** Pharmacy Consult Request **AND** If patient difficult to arouse and/or has respiratory depression (respiratory rate of 10 or less), stop any opiates that are currently infusing and call a Rapid Response.    oxyCODONE immediate-release **OR** oxyCODONE immediate-release **OR** HYDROmorphone    ondansetron    ondansetron    promethazine    promethazine    prochlorperazine    nicotine **AND** Nicotine Replacement Patient Education Materials **AND** nicotine polacrilex    Labs:  Recent Labs     04/26/24  0430 04/27/24  0905 04/28/24  0030   WBC 14.0* 12.2* 10.5   RBC 3.77* 3.72* 3.67*   HEMOGLOBIN 11.3* 11.1* 11.0*   HEMATOCRIT 34.7* 34.2* 34.4*   MCV 92.0 91.9 93.7   MCH 30.0 29.8 30.0   RDW 46.9 47.1 47.4   PLATELETCT 691* 657* 697*   MPV 8.3* 8.2* 8.5*   NEUTSPOLYS  --  79.00*  --    LYMPHOCYTES  --  11.60*  --    MONOCYTES  --  7.10  --    EOSINOPHILS  --  0.60  --    BASOPHILS  --  0.70  --      Recent Labs     04/25/24  1005 04/26/24  0430 04/28/24  0030   SODIUM 136 135 138   POTASSIUM 4.1 4.2 4.0   CHLORIDE 103 102 102   CO2 25 24 26   GLUCOSE 149* 107* 97   BUN 12 14 13     Recent Labs     04/25/24  1005 04/26/24  0430 04/28/24  0030   ALBUMIN 2.7* 2.7* 2.9*   CREATININE 0.68 0.60 0.60       Imaging:  MR-FEMUR-WITH & W/O LEFT    Result Date:  4/22/2024 4/21/2024 7:15 PM HISTORY/REASON FOR EXAM:  cellulitis Left leg, worsening WBC, eval for abscess TECHNIQUE/EXAM DESCRIPTION: MRI of the LEFT femur without and with contrast. The study was performed on a Joyus Signa 1.5 Erin MRI scanner. T1 sagittal, T1 coronal, T1 axial, T2 fat-suppressed axial, T1 postcontrast fat-suppressed axial, and fast spin-echo inversion recovery sagittal images were obtained of the femur. 15 mL ProHance contrast was administered intravenously. COMPARISON: Ultrasound of the left groin/upper thigh 4/19/2024 and CT left thigh 4/16/2024 FINDINGS: There is diffuse left thigh cutaneous and subcutaneous edema most consistent with cellulitis. Additionally, there is a rim-enhancing fluid collection involving the left lateral and posterior thigh extending from the level of the greater trochanter caudally to the level of the left knee consistent with developing abscess. This abnormality measures  12 mm in thickness and 16.7 cm in anterior to posterior length extending from approximately the 1:00-2:00 position to the 7:00 position when viewed from below. Proximal aspect is at the level of the left greater trochanter and the distal aspect just above the level of the left knee. BONE AND MARROW: The bones and bone marrow are normal in signal intensity and morphology. MUSCLES: There is edema within the hamstring musculature LIGAMENTS and TENDONS: The visualized ligament and tendon are normal in appearance. MISCELLANEOUS: No fluid collection or mass.     1.  Left lateral/posterior site cellulitis 2.  Rim-enhancing fluid collection consistent with abscess within the subcutaneous tissues of the left thigh extending from the 1:00-2:00 position to the 7:00 position, measuring 12 mm in thickness, 16.7 cm anterior to posterior, and craniocaudal extent extending from the level of the left greater trochanter to the distal thigh measuring approximately 40 cm 3.   Nonspecific edema within the hamstring  musculature 4.  Negative for osteomyelitis    US-ABDOMEN LTD (SOFT TISSUE)    Result Date: 4/20/2024 4/20/2024 1:19 AM HISTORY/REASON FOR EXAM:  Edema; cellulitis of left abdominal wall; evaluation for abscess on abdominal wall TECHNIQUE/EXAM DESCRIPTION: Limited abdominal ultrasound. COMPARISON: None available. FINDINGS: Focus ultrasound of the left chest and abdominal wall demonstrates diffuse subcutaneous edema. No discrete fluid collection identified..     1. No discrete fluid collection identified.    US-EXTREMITY NON VASCULAR UNILATERAL LEFT    Result Date: 4/19/2024 4/19/2024 12:18 PM HISTORY/REASON FOR EXAM:  eval left groin/upper thigh in the erythema area to see if any evidence of fluid collections, if so, please gavin on tissue. TECHNIQUE/EXAM DESCRIPTION AND NUMBER OF VIEWS: COMPARISON: FINDINGS: Targeted sonographic evaluation of the left inguinal region and upper thigh demonstrates subcutaneous edema without a discrete fluid collection.     Edema without a discrete fluid collection.    CT-EXTREMITY, LOWER WITH LEFT    Result Date: 4/16/2024 4/16/2024 3:06 PM HISTORY/REASON FOR EXAM:  Worsening And Ascending Erythema/Cellulitis. TECHNIQUE/EXAM DESCRIPTION AND NUMBER OF VIEWS:  CT scan of the LEFT lower extremity with contrast, with reconstructions. Thin helical 3 mm sections were obtained from the distal femur through the proximal tibia/fibula. Sagittal and coronal multiplanar reconstructions were generated from the axial images. A total of 100 mL of Omnipaque 350 nonionic contrast was administered  IV without complication. Up to date radiation dose reduction adjustments have been utilized to meet ALARA standards for radiation dose reduction. COMPARISON: None. FINDINGS: There are curvilinear areas of fluid tissue attenuation involving the subcutaneous tissues of the left thigh and lower leg most pronounced involving the lateral aspect of the thigh where it measures 1.3 cm in greatest diameter. The  muscles of the left thigh and lower leg have a normal appearance and enhancement pattern. There is no evidence of deep abscess formation. There is no evidence of acute osteomyelitis     1.  Subcutaneous inflammatory changes about the left lower leg and thigh suspicious for cellulitis. 2.  Curvilinear fluid attenuation adjacent to the musculature of the lateral aspect of the left thigh measuring 1.3 cm in greatest diameter suspicious for subcutaneous seroma less likely early abscess formation.    EC-ECHOCARDIOGRAM COMPLETE W/O CONT    Result Date: 4/15/2024  Transthoracic Echo Report Echocardiography Laboratory CONCLUSIONS No prior study is available for comparison. Normal left ventricular systolic function. The left ventricular ejection fraction is visually estimated to be 65%. Mild tricuspid regurgitation. Estimated right ventricular systolic pressure is 35 mmHg. JA FINLEY Exam Date:         04/15/2024                    15:05 Exam Location:     Inpatient Priority:          Routine Ordering Physician:        IVONE CARLTON                            TRIAGE Referring Physician:       [id number] Huang Sonographer:               Edin ALEJANDRO Age:    53     Gender:    M MRN:    [id number] :    1970 BSA:    2      Ht (in):    71     Wt (lb):    176 Exam Type:     Complete Indications:     Endocarditis ICD Codes:       421 CPT Codes:       93315 BP:   92     /   51     HR: Technical Quality:       Fair MEASUREMENTS  (Male / Female) Normal Values 2D ECHO LV Diastolic Diameter PLAX        5.1 cm                4.2 - 5.9 / 3.9 - 5.3 cm LV Systolic Diameter PLAX         3.5 cm                2.1 - 4.0 cm IVS Diastolic Thickness           0.95 cm               LVPW Diastolic Thickness          1 cm                  LVOT Diameter                     2.2 cm                Estimated LV Ejection Fraction    65 %                  LV Ejection Fraction MOD BP       66.2 %                >= 55  % LV Ejection Fraction  MOD 4C       71.1 %                LV Ejection Fraction MOD 2C       60.2 %                DOPPLER AV Peak Velocity                  1.3 m/s               AV Peak Gradient                  6.9 mmHg              AV Mean Gradient                  3.9 mmHg              LVOT Peak Velocity                1.1 m/s               AV Area Cont Eq vti               2.8 cm2               MV Velocity Time Integral         18.1 cm               Mitral E Point Velocity           0.8 m/s               Mitral E to A Ratio               1.1                   MV Pressure Half Time             37.5 ms               MV Area PHT                       5.9 cm2               MV Deceleration Time              129 ms                TR Peak Velocity                  283 cm/s              PV Peak Velocity                  0.99 m/s              PV Peak Gradient                  3.9 mmHg              * Indicates values subject to auto-interpretation LV EF:  65    % FINDINGS Left Ventricle Normal left ventricular chamber size. Normal left ventricular wall thickness. Normal left ventricular systolic function. The left ventricular ejection fraction is visually estimated to be 65%. Normal regional wall motion. Normal diastolic function. Right Ventricle The right ventricle is normal in size and systolic function. Right Atrium The right atrium is normal in size. Normal inferior vena cava size and inspiratory collapse. Left Atrium The left atrium is normal in size. Left atrial volume index is 23 mL/sq m. Mitral Valve Structurally normal mitral valve without significant stenosis or regurgitation. Aortic Valve Structurally normal aortic valve without significant stenosis or regurgitation. Tricuspid Valve Structurally normal tricuspid valve without significant stenosis. Mild tricuspid regurgitation. Right atrial pressure is estimated to be 3 mmHg. Estimated right ventricular systolic pressure is 35 mmHg. Pulmonic Valve Structurally normal pulmonic valve  without significant stenosis or regurgitation. Pericardium No pericardial effusion. Aorta Normal aortic root for body surface area. The ascending aorta is borderline dilated with a diameter of  4.0 cm. Ricky Gaines M.D. (Electronically Signed) Final Date:     15 April 2024                 16:06    US-EXTREMITY VENOUS LOWER UNILAT LEFT    Result Date: 4/14/2024  CLINICAL INFORMATION: Left lower extremity pain/edema. PROCEDURE: Ultrasound examination of left lower extremity deep venous system was performed using grayscale, color and spectral Doppler in the ultrasound section. COMPARISON: None. FINDINGS: LEFT: The left common femoral, saphenofemoral junction, femoral, and popliteal veins demonstrate a normal response to augmentation and compression maneuvers. There is also a normal response to respiratory variation. The bifurcation of the common femoral vein into the superficial and deep femoral veins is visualized.  Flow is identified in these vessels with no evidence of intraluminal thrombus on either color Doppler or grayscale images. The visualized portions of the left proximal calf veins are also normal.     No evidence of acute deep venous thrombosis in the visualized venous segments of the left lower extremity.     DX-CHEST-FOR LINE PLACEMENT Perform procedure in: Patient's Room    Result Date: 4/13/2024 4/13/2024 9:11 PM HISTORY/REASON FOR EXAM:  Other (Comment); line. TECHNIQUE/EXAM DESCRIPTION AND NUMBER OF VIEWS: Single view of the chest. COMPARISON: None FINDINGS: There has been interval insertion of a central venous catheter which terminates with the tip projecting over the expected region of the mid to distal superior vena cava. Heart size is within normal limits. No pulmonary infiltrates or consolidations are noted. No pleural abnormalities are noted.     1.  Interval insertion of a central venous catheter which terminates with the tip projecting over the expected region of the mid to distal superior  vena cava. 2.  No acute cardiopulmonary.    CT-EXTREMITY, LOWER W/O LEFT    Result Date: 4/13/2024 4/13/2024 5:02 PM HISTORY/REASON FOR EXAM:  Leg swelling, body aches. TECHNIQUE/EXAM DESCRIPTION AND NUMBER OF VIEWS: CT scan of the LEFT lower extremity without contrast and including reconstructions. Thin-section noncontrast helical images were obtained. Coronal and sagittal reconstructions were generated from the axial images. Up to date radiation dose reduction adjustments have been utilized to meet ALARA standards for radiation dose reduction. COMPARISON: None. FINDINGS: There is extensive left lower extremity edema. Skin thickening and subcutaneous fat stranding is seen extending from the thigh into the calf, ankle and foot. No drainable fluid collections are seen on this noncontrast study. No acute fracture or dislocation. No significant osteoarthrosis. Visualized pelvic organs are unremarkable. Left inguinal and iliac lymphadenopathy. Lymph nodes measure up to 1.4 cm short axis in the left external iliac region.     1.  Left lower extremity edema versus cellulitis. No drainable fluid collections on this noncontrast study. 2.  Enlarged left external iliac and inguinal lymph nodes. They may be reactive to the left lower extremity infection, however, neoplastic nodes are difficult to rule out. Close clinical follow-up is needed.    DX-CHEST-PORTABLE (1 VIEW)    Result Date: 4/13/2024 4/13/2024 4:19 PM HISTORY/REASON FOR EXAM:  Sepsis. TECHNIQUE/EXAM DESCRIPTION AND NUMBER OF VIEWS: Single portable view of the chest. COMPARISON:  None. FINDINGS: LUNGS: Clear. No effusions. PNEUMOTHORAX: None. LINES AND TUBES: None. MEDIASTINUM: No cardiomegaly. MUSCULOSKELETAL STRUCTURES: No acute displaced fracture.     No acute cardiopulmonary abnormality.       Micro:  Results       ** No results found for the last 168 hours. **            Assessment:  Active Hospital Problems    Diagnosis     *Septic shock (HCC) [A41.9,  R65.21]     Bacteremia due to Streptococcus [R78.81, B95.5]     Hypophosphatemia [E83.39]     Hypokalemia [E87.6]     Cellulitis of left lower extremity [L03.116]     Hyperglycemia [R73.9]     Tobacco dependence [F17.200]     Acute kidney injury (HCC) [N17.9]     Dehydration [E86.0]     Hyponatremia [E87.1]     Metabolic acidosis [E87.20]      Interval 24 hours:      AF, O2 RA  Labs reviewed to assess for clinical status, drug toxicity and organ function  Imaging personally reviewed both images and report.   Micro reviewed    Patient still a significant amount of drainage from the leg, soaking the dressings and pillow beneath the leg.  Continue on Unasyn as above.       Hospital Course/Assessment:   Oneal Tillman is a 53 y.o. male with with prior tobacco dependence, at risk for diabetes, admitted with left lower extremity cellulitis and group A strep bacteremia, penicillin susceptible.  MRI on 4/22 revealed rim-enhancing fluid collection just with abscess in the tissues of the left thigh measuring 12 mm in thickness, 16.7 cm x 40 cm, no osteomyelitis.     Pertinent Diagnoses:  Group A strep bacteremia, penicillin susceptible  Left lower extremity cellulitis  Left thigh abscess  Leukocytosis     Plan:     -ID saw the patient earlier and plan had been for 2-week antibiotic course if no abscess.  However abscess was identified and the patient has been continued on Unasyn.  Overall he is improving normalization of WBC and less pain and swelling and erythema.  However given the significant ongoing drainage we will continue IV course and monitor    -Continue IV Unasyn 3 g every 6 hours , once drainage decreases and becomes more manageable can likely transition to oral antibiotics and discharge    -Recommend wound care consult not already placed.    -Please place referral for outpatient ID follow-up    -HIV and hepatitis C antibodies negative     Disposition: Anticipate no ID specific disposition needs  Need for PICC  line: Likely no     Plan was discussed with the primary team, Dr Traylor. This illness poses a threat to life and limb function.  Please call us back if any worsening.  ID clinic follow-up in 2 to 3 weeks.  Okay to schedule with NP Shell

## 2024-04-28 NOTE — PROGRESS NOTES
Surgical Progress Note          HPI:  53-year-old male admitted on 2024 with ANDRE and lower extremity cellulitis associated with sepsis    Interval Events:  Patient remains afebrile.  He states his left thigh continues to feel much better.  He has a dry dressing over his left distal thigh wound today.  WBC normal now. Walked gingerly with patient into hallway      ROS  Hemodynamics:  Temp (24hrs), Av.7 °C (98 °F), Min:36.6 °C (97.8 °F), Max:36.8 °C (98.2 °F)  Temperature: 36.6 °C (97.8 °F)  Pulse  Av.7  Min: 57  Max: 112   Blood Pressure: 106/58     Respiratory:    Respiration: 18, Pulse Oximetry: 97 %     Work Of Breathing / Effort: Within Normal Limits  RUL Breath Sounds: Clear, RML Breath Sounds: Clear, RLL Breath Sounds: Clear;Diminished, GRISELDA Breath Sounds: Clear, LLL Breath Sounds: Clear;Diminished  Neuro:  GCS       Fluids:    Intake/Output Summary (Last 24 hours) at 2024 1032  Last data filed at 2024 0650  Gross per 24 hour   Intake 700 ml   Output 1300 ml   Net -600 ml     Weight: 76.3 kg (168 lb 3.4 oz)  Current Diet Order   Procedures    Diet Order Diet: Regular     Physical Exam  Appears well and comfortable  Neck supple  Regular heart rate  Normal respiratory effort  Abdomen soft  Left posterior thigh with mild nontender induration, 1 cm fistulous wound at the medial posterior left distal thigh appears clean, scant yellow drainage  Extremities otherwise with normal appearance of perfusion  Skin pink and warm        Labs:  Recent Results (from the past 24 hour(s))   Renal Function Panel    Collection Time: 24 12:30 AM   Result Value Ref Range    Sodium 138 135 - 145 mmol/L    Potassium 4.0 3.6 - 5.5 mmol/L    Chloride 102 96 - 112 mmol/L    Co2 26 20 - 33 mmol/L    Glucose 97 65 - 99 mg/dL    Creatinine 0.60 0.50 - 1.40 mg/dL    Bun 13 8 - 22 mg/dL    Calcium 8.3 (L) 8.4 - 10.2 mg/dL    Correct Calcium 9.2 8.5 - 10.5 mg/dL    Phosphorus 3.1 2.5 - 4.5 mg/dL    Albumin 2.9  (L) 3.2 - 4.9 g/dL   MAGNESIUM    Collection Time: 04/28/24 12:30 AM   Result Value Ref Range    Magnesium 1.9 1.5 - 2.5 mg/dL   CBC WITHOUT DIFFERENTIAL    Collection Time: 04/28/24 12:30 AM   Result Value Ref Range    WBC 10.5 4.8 - 10.8 K/uL    RBC 3.67 (L) 4.70 - 6.10 M/uL    Hemoglobin 11.0 (L) 14.0 - 18.0 g/dL    Hematocrit 34.4 (L) 42.0 - 52.0 %    MCV 93.7 81.4 - 97.8 fL    MCH 30.0 27.0 - 33.0 pg    MCHC 32.0 (L) 32.3 - 36.5 g/dL    RDW 47.4 35.9 - 50.0 fL    Platelet Count 697 (H) 164 - 446 K/uL    MPV 8.5 (L) 9.0 - 12.9 fL   ESTIMATED GFR    Collection Time: 04/28/24 12:30 AM   Result Value Ref Range    GFR (CKD-EPI) 114 >60 mL/min/1.73 m 2       Medical Decision Making, by Problem:  Active Hospital Problems    Diagnosis     Bacteremia due to Streptococcus [R78.81, B95.5]     Hypophosphatemia [E83.39]     Hypokalemia [E87.6]     Septic shock (HCC) [A41.9, R65.21]     Cellulitis of left lower extremity [L03.116]     Hyperglycemia [R73.9]     Tobacco dependence [F17.200]     Acute kidney injury (HCC) [N17.9]     Dehydration [E86.0]     Hyponatremia [E87.1]     Metabolic acidosis [E87.20]      Plan:  53-year-old male with left thigh cellulitis and abscess identified by prior MRI  1.  Appreciate medical care  2.  completion of p.o. antibiotic course as outpatient  3.  Diet as tolerated  4.  Follow up with wound clinic  5.  Continue dry dressing as needed to left thigh  6.  DC planning  7.  PT/OT  8.  General surgey signs off, please reconsult with any additional questions/concerns    Quality Measures:  Quality-Core Measures    Discussed patient condition with Patient and Hospitalist

## 2024-04-28 NOTE — DISCHARGE PLANNING
Case Management Discharge Planning    Admission Date: 4/13/2024  GMLOS: 5.1  ALOS: 15    6-Clicks ADL Score: 24  6-Clicks Mobility Score: 24      Anticipated Discharge Dispo: Discharge Disposition: Discharged to home/self care (01)    DME Needed: Yes    DME Ordered: Yes    Action(s) Taken:     Choice form completed for FWW from [facility] and faxed to [facility].     Per IDT rounds, pt will need outpatient wound clinic appt scheduled. CM to schedule appt whn order received and wound clinic open.    Escalations Completed: None    Medically Clear: No    Next Steps: Bedside RN to deliver FWW to pt's bedside.   f/u with medical team to discuss DC needs and barriers    Barriers to Discharge: Medical clearance

## 2024-04-29 LAB
ERYTHROCYTE [DISTWIDTH] IN BLOOD BY AUTOMATED COUNT: 46.7 FL (ref 35.9–50)
HCT VFR BLD AUTO: 35.2 % (ref 42–52)
HGB BLD-MCNC: 11.5 G/DL (ref 14–18)
MCH RBC QN AUTO: 29.8 PG (ref 27–33)
MCHC RBC AUTO-ENTMCNC: 32.7 G/DL (ref 32.3–36.5)
MCV RBC AUTO: 91.2 FL (ref 81.4–97.8)
PLATELET # BLD AUTO: 634 K/UL (ref 164–446)
PMV BLD AUTO: 8.3 FL (ref 9–12.9)
RBC # BLD AUTO: 3.86 M/UL (ref 4.7–6.1)
WBC # BLD AUTO: 12 K/UL (ref 4.8–10.8)

## 2024-04-29 PROCEDURE — A9270 NON-COVERED ITEM OR SERVICE: HCPCS | Performed by: INTERNAL MEDICINE

## 2024-04-29 PROCEDURE — 700111 HCHG RX REV CODE 636 W/ 250 OVERRIDE (IP): Mod: JZ | Performed by: INTERNAL MEDICINE

## 2024-04-29 PROCEDURE — 94760 N-INVAS EAR/PLS OXIMETRY 1: CPT

## 2024-04-29 PROCEDURE — 85027 COMPLETE CBC AUTOMATED: CPT

## 2024-04-29 PROCEDURE — 700102 HCHG RX REV CODE 250 W/ 637 OVERRIDE(OP): Performed by: HOSPITALIST

## 2024-04-29 PROCEDURE — 770006 HCHG ROOM/CARE - MED/SURG/GYN SEMI*

## 2024-04-29 PROCEDURE — 700102 HCHG RX REV CODE 250 W/ 637 OVERRIDE(OP): Performed by: INTERNAL MEDICINE

## 2024-04-29 PROCEDURE — 99233 SBSQ HOSP IP/OBS HIGH 50: CPT | Performed by: INTERNAL MEDICINE

## 2024-04-29 PROCEDURE — 700105 HCHG RX REV CODE 258: Performed by: INTERNAL MEDICINE

## 2024-04-29 PROCEDURE — A9270 NON-COVERED ITEM OR SERVICE: HCPCS | Performed by: HOSPITALIST

## 2024-04-29 RX ADMIN — SENNOSIDES AND DOCUSATE SODIUM 2 TABLET: 50; 8.6 TABLET ORAL at 16:37

## 2024-04-29 RX ADMIN — RIVAROXABAN 10 MG: 10 TABLET, FILM COATED ORAL at 16:40

## 2024-04-29 RX ADMIN — NICOTINE 14 MG: 14 PATCH, EXTENDED RELEASE TRANSDERMAL at 05:04

## 2024-04-29 RX ADMIN — GABAPENTIN 200 MG: 100 CAPSULE ORAL at 05:04

## 2024-04-29 RX ADMIN — AMPICILLIN AND SULBACTAM 3 G: 1; 2 INJECTION, POWDER, FOR SOLUTION INTRAMUSCULAR; INTRAVENOUS at 23:13

## 2024-04-29 RX ADMIN — SENNOSIDES AND DOCUSATE SODIUM 2 TABLET: 50; 8.6 TABLET ORAL at 05:04

## 2024-04-29 RX ADMIN — GABAPENTIN 200 MG: 100 CAPSULE ORAL at 12:10

## 2024-04-29 RX ADMIN — AMPICILLIN AND SULBACTAM 3 G: 1; 2 INJECTION, POWDER, FOR SOLUTION INTRAMUSCULAR; INTRAVENOUS at 12:09

## 2024-04-29 RX ADMIN — GABAPENTIN 200 MG: 100 CAPSULE ORAL at 16:37

## 2024-04-29 RX ADMIN — AMPICILLIN AND SULBACTAM 3 G: 1; 2 INJECTION, POWDER, FOR SOLUTION INTRAMUSCULAR; INTRAVENOUS at 05:06

## 2024-04-29 RX ADMIN — AMPICILLIN AND SULBACTAM 3 G: 1; 2 INJECTION, POWDER, FOR SOLUTION INTRAMUSCULAR; INTRAVENOUS at 16:38

## 2024-04-29 RX ADMIN — OXYCODONE HYDROCHLORIDE 5 MG: 5 TABLET ORAL at 22:02

## 2024-04-29 ASSESSMENT — PAIN DESCRIPTION - PAIN TYPE
TYPE: ACUTE PAIN

## 2024-04-29 ASSESSMENT — ENCOUNTER SYMPTOMS
CHILLS: 0
FEVER: 0

## 2024-04-29 NOTE — THERAPY
Physical Therapy   Discharge     Patient Name: Oneal Tillman  Age:  53 y.o., Sex:  male  Medical Record #: [id number]  Today's Date: 4/29/2024 04/29/24 0930   Initial Contact Note    Initial Contact Note Order Received and Verified. Physical Therapy Evaluation NOT Completed Because Patient Does Not Require Acute Physical Therapy at this Time.   Interdisciplinary Plan of Care Collaboration   IDT Collaboration with  Nursing   Collaboration Comments PT eval attempted, pt adamantly refused PT, states he does fine with the walker and does not want any further instruction from PT. D/W RN and notified that will DC PT at this time.

## 2024-04-29 NOTE — DISCHARGE PLANNING
Case Management Discharge Planning    Admission Date: 4/13/2024  GMLOS: 5.1  ALOS: 16    6-Clicks ADL Score: 24  6-Clicks Mobility Score: 24      Anticipated Discharge Dispo: Discharge Disposition: D/T to home under [facility] care in anticipation of covered skilled care (06)     Action(s) Taken: Referral(s) sent    @[id number]  Received notice patient's worker's compensation  is requesting an update regarding discharge plan.  Name: Nida Vargas  Phone Number: [phone]    @[phone]  Spoke to Nida. Informed Nida patient is being continued on IV antibiotics due to wound drainage, per ID notes, but that it seems patient will be able to transition to orals. Nida had questions about whether patient would need outpatient IV antibiotics, home health, or DME equipment, as well as the anticipated discharge date. LSW to bring her questions up during IDT rounds and update her afterwards.     @1047  Patient discussed during IDT rounds. Patient may be appropriate for LTAC.     @1155  LTAC referral placed by MD. Referral sent to [facility] for review. Spoke to Alina; they take workers compensation. Alina to speak to patient today. Call placed to Nida x2- no answer, line connects and then disconnects. LSW to attempt reaching her again later today.     @1211  Spoke to worker's compensation , Nida. Nida reports she is able to assist with setting up home health and at home IV antibiotics- patient is not agreeable to LTAC. LSW to remain in contact with Nida. Need final ID recommendations.     @1235  LTAC liaison spoke to patient at bedside. Reports she will begin insurance authorization. Patient expressed understanding that he may need long term IV antibiotics. Current discharge disposition is unknown: LTAC versus home with home health and IV antibiotics.

## 2024-04-29 NOTE — PROGRESS NOTES
Hospital Medicine Daily Progress Note    Date of Service  4/29/2024    Chief Complaint  Oneal Tillman is a 53 y.o. male admitted 4/13/2024 with septic shock    Hospital Course  Patient is a 53-year-old male with diabetes and tobacco dependence who presented with leg swelling of the left leg for 1 week and body aches.  Questionable spider bite on the left leg after he changed his pants and then 2 days later started having worsening pain redness and swelling.  Also noticed chills palpitations weakness.  Tachycardic with a heart rate of 104 and low blood pressure.  His initial lactic acid was 4.3 and white count was 15.  Patient was initially admitted and given fluid resuscitation and started on IV antibiotics.  Blood cultures came back positive for group B strep and infectious disease recommended in addition to continuation of Zyvox to add pen G.  He is having worsening left leg erythema and ascending rash with blistering lesions.  Repeat CT left leg pending.    Interval Problem Update  4/28:  Patient started walking today.  I encouraged to continue with walker.  - I discussed with general surgery, he agreed improving on walking will help the abscess to drain  - I discussed with ID, will need to continue IV Unasyn until discharge has improved in quantity.  Unfortunately is not time to start oral antibiotics.    4/29:  Continue IV Unasyn as per ID  Monitoring left popliteal discharge amount  - WBC 12.0 today, increased.  Platelets 634.    I have discussed this patient's plan of care and discharge plan at IDT rounds today with Case Management, Nursing, Nursing leadership, and other members of the IDT team.    Consultants/Specialty  general surgery and infectious disease    Code Status  Full Code    Disposition  The patient is not medically cleared for discharge to home or a post-acute facility.      I have placed the appropriate orders for post-discharge needs.    Review of Systems  Review of Systems   Constitutional:   Negative for chills and fever.        Physical Exam  Temp:  [36.5 °C (97.7 °F)-37.1 °C (98.8 °F)] 36.8 °C (98.2 °F)  Pulse:  [68-95] 80  Resp:  [18] 18  BP: (100-113)/(53-65) 113/53  SpO2:  [94 %-96 %] 96 %    Physical Exam  Vitals and nursing note reviewed.   Constitutional:       General: He is not in acute distress.     Appearance: He is not diaphoretic.   HENT:      Mouth/Throat:      Mouth: Mucous membranes are dry.      Pharynx: No oropharyngeal exudate.   Cardiovascular:      Rate and Rhythm: Normal rate and regular rhythm.      Pulses: Normal pulses.      Heart sounds: Normal heart sounds. No murmur heard.  Pulmonary:      Effort: Pulmonary effort is normal. No respiratory distress.      Breath sounds: Normal breath sounds. No wheezing or rales.   Abdominal:      General: Bowel sounds are normal. There is no distension.      Tenderness: There is no abdominal tenderness.   Musculoskeletal:         General: Tenderness (Left leg, thigh) present. No swelling. Normal range of motion.   Skin:     General: Skin is warm.      Capillary Refill: Capillary refill takes less than 2 seconds.      Coloration: Skin is not jaundiced or pale.      Findings: Lesion (Left popliteal region, purulent drainage noted) present.   Neurological:      General: No focal deficit present.      Mental Status: He is alert and oriented to person, place, and time. Mental status is at baseline.      Motor: No weakness.   Psychiatric:         Mood and Affect: Mood normal.         Behavior: Behavior normal.         Thought Content: Thought content normal.         Judgment: Judgment normal.         Fluids    Intake/Output Summary (Last 24 hours) at 4/29/2024 1722  Last data filed at 4/29/2024 1000  Gross per 24 hour   Intake 3349.21 ml   Output 1300 ml   Net 2049.21 ml       Laboratory  Recent Labs     04/27/24  0905 04/28/24  0030 04/29/24  0047   WBC 12.2* 10.5 12.0*   RBC 3.72* 3.67* 3.86*   HEMOGLOBIN 11.1* 11.0* 11.5*   HEMATOCRIT 34.2*  34.4* 35.2*   MCV 91.9 93.7 91.2   MCH 29.8 30.0 29.8   MCHC 32.5 32.0* 32.7   RDW 47.1 47.4 46.7   PLATELETCT 657* 697* 634*   MPV 8.2* 8.5* 8.3*     Recent Labs     04/28/24  0030   SODIUM 138   POTASSIUM 4.0   CHLORIDE 102   CO2 26   GLUCOSE 97   BUN 13   CREATININE 0.60   CALCIUM 8.3*                     Imaging  MR-FEMUR-WITH & W/O LEFT   Final Result      1.  Left lateral/posterior site cellulitis      2.  Rim-enhancing fluid collection consistent with abscess within the subcutaneous tissues of the left thigh extending from the 1:00-2:00 position to the 7:00 position, measuring 12 mm in thickness, 16.7 cm anterior to posterior, and craniocaudal extent    extending from the level of the left greater trochanter to the distal thigh measuring approximately 40 cm      3.   Nonspecific edema within the hamstring musculature      4.  Negative for osteomyelitis      US-ABDOMEN LTD (SOFT TISSUE)   Final Result         1. No discrete fluid collection identified.      US-EXTREMITY NON VASCULAR UNILATERAL LEFT   Final Result      Edema without a discrete fluid collection.      CT-EXTREMITY, LOWER WITH LEFT   Final Result      1.  Subcutaneous inflammatory changes about the left lower leg and thigh suspicious for cellulitis.      2.  Curvilinear fluid attenuation adjacent to the musculature of the lateral aspect of the left thigh measuring 1.3 cm in greatest diameter suspicious for subcutaneous seroma less likely early abscess formation.      EC-ECHOCARDIOGRAM COMPLETE W/O CONT   Final Result      US-EXTREMITY VENOUS LOWER UNILAT LEFT   Final Result         No evidence of acute deep venous thrombosis in the visualized venous segments of the left lower extremity.         DX-CHEST-FOR LINE PLACEMENT Perform procedure in: Patient's Room   Final Result      1.  Interval insertion of a central venous catheter which terminates with the tip projecting over the expected region of the mid to distal superior vena cava.   2.  No  acute cardiopulmonary.      CT-EXTREMITY, LOWER W/O LEFT   Final Result      1.  Left lower extremity edema versus cellulitis. No drainable fluid collections on this noncontrast study.   2.  Enlarged left external iliac and inguinal lymph nodes. They may be reactive to the left lower extremity infection, however, neoplastic nodes are difficult to rule out. Close clinical follow-up is needed.      DX-CHEST-PORTABLE (1 VIEW)   Final Result      No acute cardiopulmonary abnormality.              Assessment/Plan  * Septic shock (HCC)- (present on admission)  Assessment & Plan  WBC 12.0, increased today.  Continue IV Unasyn  Encourage ambulation    Bacteremia due to Streptococcus- (present on admission)  Assessment & Plan  4/13/24 blood culture positive for strep group A  4/15  -cultures negative x 2  Echo did not show vegetations    Continue IV Unasyn as per ID recommendations    Hypokalemia- (present on admission)  Assessment & Plan  Potassium 4.0    Hypophosphatemia- (present on admission)  Assessment & Plan  Prostate 3.1    Metabolic acidosis- (present on admission)  Assessment & Plan  Bicarb 24    Hyponatremia- (present on admission)  Assessment & Plan  Sodium 138    Dehydration- (present on admission)  Assessment & Plan  Resolved, IV diuresis prn      Acute kidney injury (HCC)- (present on admission)  Assessment & Plan  Resolved, now with volume overload, continue as needed Lasix    Tobacco dependence- (present on admission)  Assessment & Plan  Counseled on admission    Hyperglycemia- (present on admission)  Assessment & Plan  A1c 5.8, prediabetes    Cellulitis of left lower extremity- (present on admission)  Assessment & Plan  I discussed with surgery, continue to monitor at this time and complete antibiotics.  No surgery.  Wound care consult has been placed and pending  - I discussed with general surgery, he agreed improving on walking will help the abscess to drain  - I discussed with ID, continue IV Unasyn,  keep on IV antibiotics until left popliteal wound discharge has decreased in volume.  Unfortunately is not time to start oral antibiotics.         VTE prophylaxis:    Xarelto 10mg daily as prophylaxis      I have performed a physical exam and reviewed and updated ROS and Plan today (4/29/2024). In review of yesterday's note (4/28/2024), there are no changes except as documented above.      Total time spent 36 minutes

## 2024-04-29 NOTE — CARE PLAN
Problem: Pain - Standard  Goal: Alleviation of pain or a reduction in pain to the patient’s comfort goal  Outcome: Progressing     Problem: Skin Integrity  Goal: Skin integrity is maintained or improved  Outcome: Progressing     Problem: Wound/ / Incision Healing  Goal: Patient's wound/surgical incision will decrease in size and heals properly  Outcome: Progressing   The patient is Stable - Low risk of patient condition declining or worsening    Shift Goals  Clinical Goals: IV Abx,pain control,monitor wound drainage  Patient Goals: go home  Family Goals: No family present    Progress made toward(s) clinical / shift goals:  Educated on fall prevention measures,call light within reach,updated on plan of care.    Patient is not progressing towards the following goals:

## 2024-04-29 NOTE — CARE PLAN
The patient is Stable - Low risk of patient condition declining or worsening    Shift Goals  Clinical Goals: IV Abx,pain control,monitor wound drainage  Patient Goals: go home  Family Goals: No family present    Progress made toward(s) clinical / shift goals:      Patient is not progressing towards the following goals:      Problem: Pain - Standard  Goal: Alleviation of pain or a reduction in pain to the patient’s comfort goal  Outcome: Progressing     Problem: Knowledge Deficit - Standard  Goal: Patient and family/care givers will demonstrate understanding of plan of care, disease process/condition, diagnostic tests and medications  Outcome: Progressing     Problem: Hemodynamics  Goal: Patient's hemodynamics, fluid balance and neurologic status will be stable or improve  Outcome: Progressing     Problem: Fluid Volume  Goal: Fluid volume balance will be maintained  Outcome: Progressing     Problem: Urinary - Renal Perfusion  Goal: Ability to achieve and maintain adequate renal perfusion and functioning will improve  Outcome: Progressing     Problem: Respiratory  Goal: Patient will achieve/maintain optimum respiratory ventilation and gas exchange  Outcome: Progressing     Problem: Mechanical Ventilation  Goal: Safe management of artificial airway and ventilation  Outcome: Progressing  Goal: Successful weaning off mechanical ventilator, spontaneously maintains adequate gas exchange  Outcome: Progressing  Goal: Patient will be able to express needs and understand communication  Outcome: Progressing     Problem: Physical Regulation  Goal: Diagnostic test results will improve  Outcome: Progressing  Goal: Signs and symptoms of infection will decrease  Outcome: Progressing     Problem: Fall Risk  Goal: Patient will remain free from falls  Outcome: Progressing     Problem: Skin Integrity  Goal: Skin integrity is maintained or improved  Outcome: Progressing     Problem: Wound/ / Incision Healing  Goal: Patient's  wound/surgical incision will decrease in size and heals properly  Outcome: Progressing

## 2024-04-29 NOTE — PROGRESS NOTES
Hospital Medicine Daily Progress Note    Date of Service  4/28/2024    Chief Complaint  Oneal Tillman is a 53 y.o. male admitted 4/13/2024 with septic shock    Hospital Course  Patient is a 53-year-old male with diabetes and tobacco dependence who presented with leg swelling of the left leg for 1 week and body aches.  Questionable spider bite on the left leg after he changed his pants and then 2 days later started having worsening pain redness and swelling.  Also noticed chills palpitations weakness.  Tachycardic with a heart rate of 104 and low blood pressure.  His initial lactic acid was 4.3 and white count was 15.  Patient was initially admitted and given fluid resuscitation and started on IV antibiotics.  Blood cultures came back positive for group B strep and infectious disease recommended in addition to continuation of Zyvox to add pen G.  He is having worsening left leg erythema and ascending rash with blistering lesions.  Repeat CT left leg pending.    Interval Problem Update  Patient started walking today.  I encouraged to continue with walker.  - I discussed with general surgery, he agreed improving on walking will help the abscess to drain  - I discussed with ID, will need to continue IV Unasyn until discharge has improved in quantity.  Unfortunately is not time to start oral antibiotics.    I have discussed this patient's plan of care and discharge plan at IDT rounds today with Case Management, Nursing, Nursing leadership, and other members of the IDT team.    Consultants/Specialty  general surgery and infectious disease    Code Status  Full Code    Disposition  The patient is not medically cleared for discharge to home or a post-acute facility.      I have placed the appropriate orders for post-discharge needs.    Review of Systems  Review of Systems   Constitutional:  Negative for chills and fever.        Physical Exam  Temp:  [36.6 °C (97.8 °F)-36.8 °C (98.2 °F)] 36.7 °C (98 °F)  Pulse:  [72-86]  83  Resp:  [16-18] 18  BP: ()/(47-60) 107/60  SpO2:  [93 %-97 %] 94 %    Physical Exam  Vitals and nursing note reviewed.   Constitutional:       General: He is not in acute distress.     Appearance: He is not diaphoretic.   HENT:      Mouth/Throat:      Mouth: Mucous membranes are dry.      Pharynx: No oropharyngeal exudate.   Cardiovascular:      Rate and Rhythm: Normal rate and regular rhythm.      Pulses: Normal pulses.      Heart sounds: Normal heart sounds. No murmur heard.  Pulmonary:      Effort: Pulmonary effort is normal. No respiratory distress.      Breath sounds: Normal breath sounds. No wheezing or rales.   Abdominal:      General: Bowel sounds are normal. There is no distension.      Tenderness: There is no abdominal tenderness.   Musculoskeletal:         General: Tenderness (Left leg, thigh) present. No swelling. Normal range of motion.   Skin:     General: Skin is warm.      Capillary Refill: Capillary refill takes less than 2 seconds.      Coloration: Skin is not jaundiced or pale.      Findings: Lesion (Left popliteal region, purulent drainage noted) present.   Neurological:      General: No focal deficit present.      Mental Status: He is alert and oriented to person, place, and time. Mental status is at baseline.      Motor: No weakness.   Psychiatric:         Mood and Affect: Mood normal.         Behavior: Behavior normal.         Thought Content: Thought content normal.         Judgment: Judgment normal.         Fluids    Intake/Output Summary (Last 24 hours) at 4/28/2024 1941  Last data filed at 4/28/2024 1400  Gross per 24 hour   Intake 950 ml   Output 1300 ml   Net -350 ml       Laboratory  Recent Labs     04/26/24  0430 04/27/24  0905 04/28/24  0030   WBC 14.0* 12.2* 10.5   RBC 3.77* 3.72* 3.67*   HEMOGLOBIN 11.3* 11.1* 11.0*   HEMATOCRIT 34.7* 34.2* 34.4*   MCV 92.0 91.9 93.7   MCH 30.0 29.8 30.0   MCHC 32.6 32.5 32.0*   RDW 46.9 47.1 47.4   PLATELETCT 691* 657* 697*   MPV 8.3* 8.2*  8.5*     Recent Labs     04/26/24  0430 04/28/24  0030   SODIUM 135 138   POTASSIUM 4.2 4.0   CHLORIDE 102 102   CO2 24 26   GLUCOSE 107* 97   BUN 14 13   CREATININE 0.60 0.60   CALCIUM 8.0* 8.3*                     Imaging  MR-FEMUR-WITH & W/O LEFT   Final Result      1.  Left lateral/posterior site cellulitis      2.  Rim-enhancing fluid collection consistent with abscess within the subcutaneous tissues of the left thigh extending from the 1:00-2:00 position to the 7:00 position, measuring 12 mm in thickness, 16.7 cm anterior to posterior, and craniocaudal extent    extending from the level of the left greater trochanter to the distal thigh measuring approximately 40 cm      3.   Nonspecific edema within the hamstring musculature      4.  Negative for osteomyelitis      US-ABDOMEN LTD (SOFT TISSUE)   Final Result         1. No discrete fluid collection identified.      US-EXTREMITY NON VASCULAR UNILATERAL LEFT   Final Result      Edema without a discrete fluid collection.      CT-EXTREMITY, LOWER WITH LEFT   Final Result      1.  Subcutaneous inflammatory changes about the left lower leg and thigh suspicious for cellulitis.      2.  Curvilinear fluid attenuation adjacent to the musculature of the lateral aspect of the left thigh measuring 1.3 cm in greatest diameter suspicious for subcutaneous seroma less likely early abscess formation.      EC-ECHOCARDIOGRAM COMPLETE W/O CONT   Final Result      US-EXTREMITY VENOUS LOWER UNILAT LEFT   Final Result         No evidence of acute deep venous thrombosis in the visualized venous segments of the left lower extremity.         DX-CHEST-FOR LINE PLACEMENT Perform procedure in: Patient's Room   Final Result      1.  Interval insertion of a central venous catheter which terminates with the tip projecting over the expected region of the mid to distal superior vena cava.   2.  No acute cardiopulmonary.      CT-EXTREMITY, LOWER W/O LEFT   Final Result      1.  Left lower  extremity edema versus cellulitis. No drainable fluid collections on this noncontrast study.   2.  Enlarged left external iliac and inguinal lymph nodes. They may be reactive to the left lower extremity infection, however, neoplastic nodes are difficult to rule out. Close clinical follow-up is needed.      DX-CHEST-PORTABLE (1 VIEW)   Final Result      No acute cardiopulmonary abnormality.              Assessment/Plan  * Septic shock (HCC)- (present on admission)  Assessment & Plan  Continue Unasyn    Bacteremia due to Streptococcus- (present on admission)  Assessment & Plan  4/13/24 blood culture positive for strep group A  4/15  -cultures negative x 2  Echo did not show vegetations    -Continue on IV Unasyn    Hypokalemia- (present on admission)  Assessment & Plan  Potassium 4.0    Hypophosphatemia- (present on admission)  Assessment & Plan  Prostate 3.1    Metabolic acidosis- (present on admission)  Assessment & Plan  Bicarb 24    Hyponatremia- (present on admission)  Assessment & Plan  Sodium 138    Dehydration- (present on admission)  Assessment & Plan  Resolved, IV diuresis prn      Acute kidney injury (HCC)- (present on admission)  Assessment & Plan  Resolved, now with volume overload, continue as needed Lasix    Tobacco dependence- (present on admission)  Assessment & Plan  Counseled on admission    Hyperglycemia- (present on admission)  Assessment & Plan  A1c 5.8, prediabetes    Cellulitis of left lower extremity- (present on admission)  Assessment & Plan  I discussed with surgery, continue to monitor at this time and complete antibiotics.  No surgery.  Wound care consult has been placed and pending  - I discussed with general surgery, he agreed improving on walking will help the abscess to drain  - I discussed with ID, will need to continue IV Unasyn until discharge has improved in quantity.  Unfortunately is not time to start oral antibiotics.         VTE prophylaxis:    Xarelto 10mg daily as  prophylaxis      I have performed a physical exam and reviewed and updated ROS and Plan today (4/28/2024). In review of yesterday's note (4/27/2024), there are no changes except as documented above.      Total time spent 37 minutes.    This included my review of patient's overnight RN notes, face to face interview, physical examination, lab analysis.  Also includes repeat visits with the patient, and my documented assessments and interventions above.  I have spoken with specialist from general surgery and infectious disease.  In addition, I spoke with entire care team on patient's treatment plan, and DC planning on morning rounds and IDT rounds.

## 2024-04-30 VITALS
TEMPERATURE: 98.1 F | HEART RATE: 89 BPM | RESPIRATION RATE: 18 BRPM | WEIGHT: 158.95 LBS | HEIGHT: 71 IN | OXYGEN SATURATION: 97 % | DIASTOLIC BLOOD PRESSURE: 61 MMHG | SYSTOLIC BLOOD PRESSURE: 114 MMHG | BODY MASS INDEX: 22.25 KG/M2

## 2024-04-30 LAB
ALBUMIN SERPL BCP-MCNC: 2.8 G/DL (ref 3.2–4.9)
BASOPHILS # BLD AUTO: 0.7 % (ref 0–1.8)
BASOPHILS # BLD: 0.07 K/UL (ref 0–0.12)
BUN SERPL-MCNC: 11 MG/DL (ref 8–22)
CALCIUM ALBUM COR SERPL-MCNC: 9.1 MG/DL (ref 8.5–10.5)
CALCIUM SERPL-MCNC: 8.1 MG/DL (ref 8.4–10.2)
CHLORIDE SERPL-SCNC: 105 MMOL/L (ref 96–112)
CO2 SERPL-SCNC: 26 MMOL/L (ref 20–33)
CREAT SERPL-MCNC: 0.54 MG/DL (ref 0.5–1.4)
EOSINOPHIL # BLD AUTO: 0.12 K/UL (ref 0–0.51)
EOSINOPHIL NFR BLD: 1.1 % (ref 0–6.9)
ERYTHROCYTE [DISTWIDTH] IN BLOOD BY AUTOMATED COUNT: 46.1 FL (ref 35.9–50)
GFR SERPLBLD CREATININE-BSD FMLA CKD-EPI: 118 ML/MIN/1.73 M 2
GLUCOSE SERPL-MCNC: 123 MG/DL (ref 65–99)
HCT VFR BLD AUTO: 33.3 % (ref 42–52)
HGB BLD-MCNC: 10.8 G/DL (ref 14–18)
IMM GRANULOCYTES # BLD AUTO: 0.06 K/UL (ref 0–0.11)
IMM GRANULOCYTES NFR BLD AUTO: 0.6 % (ref 0–0.9)
LYMPHOCYTES # BLD AUTO: 1.99 K/UL (ref 1–4.8)
LYMPHOCYTES NFR BLD: 18.7 % (ref 22–41)
MAGNESIUM SERPL-MCNC: 1.9 MG/DL (ref 1.5–2.5)
MCH RBC QN AUTO: 29.9 PG (ref 27–33)
MCHC RBC AUTO-ENTMCNC: 32.4 G/DL (ref 32.3–36.5)
MCV RBC AUTO: 92.2 FL (ref 81.4–97.8)
MONOCYTES # BLD AUTO: 0.99 K/UL (ref 0–0.85)
MONOCYTES NFR BLD AUTO: 9.3 % (ref 0–13.4)
NEUTROPHILS # BLD AUTO: 7.42 K/UL (ref 1.82–7.42)
NEUTROPHILS NFR BLD: 69.6 % (ref 44–72)
NRBC # BLD AUTO: 0 K/UL
NRBC BLD-RTO: 0 /100 WBC (ref 0–0.2)
PHOSPHATE SERPL-MCNC: 3.6 MG/DL (ref 2.5–4.5)
PLATELET # BLD AUTO: 540 K/UL (ref 164–446)
PMV BLD AUTO: 8.3 FL (ref 9–12.9)
POTASSIUM SERPL-SCNC: 3.7 MMOL/L (ref 3.6–5.5)
PROCALCITONIN SERPL-MCNC: 0.02 NG/ML
RBC # BLD AUTO: 3.61 M/UL (ref 4.7–6.1)
SODIUM SERPL-SCNC: 140 MMOL/L (ref 135–145)
WBC # BLD AUTO: 10.7 K/UL (ref 4.8–10.8)

## 2024-04-30 PROCEDURE — 700111 HCHG RX REV CODE 636 W/ 250 OVERRIDE (IP): Mod: JZ | Performed by: INTERNAL MEDICINE

## 2024-04-30 PROCEDURE — 700102 HCHG RX REV CODE 250 W/ 637 OVERRIDE(OP): Performed by: INTERNAL MEDICINE

## 2024-04-30 PROCEDURE — A9270 NON-COVERED ITEM OR SERVICE: HCPCS | Performed by: HOSPITALIST

## 2024-04-30 PROCEDURE — A9270 NON-COVERED ITEM OR SERVICE: HCPCS | Performed by: INTERNAL MEDICINE

## 2024-04-30 PROCEDURE — 770006 HCHG ROOM/CARE - MED/SURG/GYN SEMI*

## 2024-04-30 PROCEDURE — 99232 SBSQ HOSP IP/OBS MODERATE 35: CPT | Performed by: INTERNAL MEDICINE

## 2024-04-30 PROCEDURE — 94760 N-INVAS EAR/PLS OXIMETRY 1: CPT

## 2024-04-30 PROCEDURE — 99233 SBSQ HOSP IP/OBS HIGH 50: CPT | Performed by: INTERNAL MEDICINE

## 2024-04-30 PROCEDURE — 700102 HCHG RX REV CODE 250 W/ 637 OVERRIDE(OP): Performed by: HOSPITALIST

## 2024-04-30 PROCEDURE — 700105 HCHG RX REV CODE 258: Performed by: INTERNAL MEDICINE

## 2024-04-30 RX ORDER — AMOXICILLIN AND CLAVULANATE POTASSIUM 875; 125 MG/1; MG/1
1 TABLET, FILM COATED ORAL EVERY 12 HOURS
Status: DISCONTINUED | OUTPATIENT
Start: 2024-04-30 | End: 2024-04-30

## 2024-04-30 RX ORDER — AMOXICILLIN AND CLAVULANATE POTASSIUM 875; 125 MG/1; MG/1
1 TABLET, FILM COATED ORAL EVERY 8 HOURS
Status: DISCONTINUED | OUTPATIENT
Start: 2024-04-30 | End: 2024-05-01 | Stop reason: HOSPADM

## 2024-04-30 RX ADMIN — GABAPENTIN 200 MG: 100 CAPSULE ORAL at 17:07

## 2024-04-30 RX ADMIN — AMOXICILLIN AND CLAVULANATE POTASSIUM 1 TABLET: 875; 125 TABLET, FILM COATED ORAL at 21:45

## 2024-04-30 RX ADMIN — OXYCODONE HYDROCHLORIDE 5 MG: 5 TABLET ORAL at 20:15

## 2024-04-30 RX ADMIN — AMOXICILLIN AND CLAVULANATE POTASSIUM 1 TABLET: 875; 125 TABLET, FILM COATED ORAL at 10:07

## 2024-04-30 RX ADMIN — GABAPENTIN 200 MG: 100 CAPSULE ORAL at 12:07

## 2024-04-30 RX ADMIN — RIVAROXABAN 10 MG: 10 TABLET, FILM COATED ORAL at 17:07

## 2024-04-30 RX ADMIN — AMPICILLIN AND SULBACTAM 3 G: 1; 2 INJECTION, POWDER, FOR SOLUTION INTRAMUSCULAR; INTRAVENOUS at 04:52

## 2024-04-30 RX ADMIN — NICOTINE 14 MG: 14 PATCH, EXTENDED RELEASE TRANSDERMAL at 04:44

## 2024-04-30 RX ADMIN — GABAPENTIN 200 MG: 100 CAPSULE ORAL at 04:44

## 2024-04-30 RX ADMIN — OXYCODONE HYDROCHLORIDE 5 MG: 5 TABLET ORAL at 09:11

## 2024-04-30 ASSESSMENT — PAIN DESCRIPTION - PAIN TYPE
TYPE: ACUTE PAIN

## 2024-04-30 ASSESSMENT — ENCOUNTER SYMPTOMS
NAUSEA: 0
DIARRHEA: 0
ABDOMINAL PAIN: 0
NERVOUS/ANXIOUS: 0
DEPRESSION: 0
COUGH: 0
HEADACHES: 0
SPEECH CHANGE: 0
FEVER: 0
PHOTOPHOBIA: 0
CONSTIPATION: 0
CLAUDICATION: 0
HEARTBURN: 0
VOMITING: 0
DIZZINESS: 0
SHORTNESS OF BREATH: 0
SENSORY CHANGE: 0
MYALGIAS: 0
INSOMNIA: 0
CHILLS: 0
BLURRED VISION: 0
WEAKNESS: 0

## 2024-04-30 ASSESSMENT — FIBROSIS 4 INDEX: FIB4 SCORE: 0.54

## 2024-04-30 NOTE — PROGRESS NOTES
Infectious Disease Progress Note    Author: Ashley Satno M.D. Date & Time of service: 2024  1:42 PM    Chief Complaint:  Follow-up for cellulitis and abscess     Interval History:   patient remains afebrile and white count slightly down to 22.3, CT scan with curvilinear fluid collection lateral thigh but no obvious air   Tmax 99.6, erythema continue to track up the lateral abdominal wall and white count continuing to trend up, 33.5 today.  Cultures as below   patient afebrile, white count continues to trend up to 38.2, however the erythema tracking of the lateral abdominal wall has noticeably improved and receded.  The swelling of lateral proximal thigh has worsened   patient remains afebrile, erythema of the lateral abdominal wall continues to recede and has nearly completely resolved, and white count finally decreased today, down to 33.4, creatinine 0.55   AF, O2 RA, still a significant amount of purulent drainage from the leg, soaking the dressings and pillow beneath the leg.  Continue on Unasyn as above.    Review of Systems:  Review of Systems   Respiratory:  Negative for cough.    Gastrointestinal:  Negative for abdominal pain, constipation, diarrhea, nausea and vomiting.   Genitourinary:  Negative for dysuria.   Musculoskeletal:  Negative for joint pain.   Psychiatric/Behavioral:  The patient is not nervous/anxious.        Hemodynamics:  Temp (24hrs), Av.6 °C (97.9 °F), Min:36.3 °C (97.4 °F), Max:36.8 °C (98.2 °F)  Temperature: 36.3 °C (97.4 °F)  Pulse  Av  Min: 57  Max: 112   Blood Pressure: 110/60       Physical Exam:  Physical Exam  Cardiovascular:      Rate and Rhythm: Normal rate and regular rhythm.      Heart sounds: Normal heart sounds.   Pulmonary:      Effort: Pulmonary effort is normal.      Breath sounds: Normal breath sounds.   Abdominal:      General: Abdomen is flat. Bowel sounds are normal.      Palpations: Abdomen is soft.   Musculoskeletal:          General: Tenderness present.      Left lower leg: Edema present.      Comments: Left leg with some induration above the knee, mild erythema and some tenderness below the knee, still with ongoing drainage in the popliteal area but has decreased   Skin:     General: Skin is warm and dry.   Neurological:      General: No focal deficit present.      Mental Status: He is oriented to person, place, and time.   Psychiatric:         Mood and Affect: Mood normal.         Behavior: Behavior normal.         Meds:    Current Facility-Administered Medications:     amoxicillin-clavulanate    gabapentin    rivaroxaban    acetaminophen    senna-docusate **AND** polyethylene glycol/lytes    Notify provider if pain remains uncontrolled **AND** Use the Numeric Rating Scale (NRS), Green-Baker Faces (WBF), or FLACC on regular floors and Critical-Care Pain Observation Tool (CPOT) on ICUs/Trauma to assess pain **AND** Pulse Ox **AND** Pharmacy Consult Request **AND** If patient difficult to arouse and/or has respiratory depression (respiratory rate of 10 or less), stop any opiates that are currently infusing and call a Rapid Response.    oxyCODONE immediate-release **OR** oxyCODONE immediate-release **OR** HYDROmorphone    ondansetron    ondansetron    promethazine    promethazine    prochlorperazine    nicotine **AND** Nicotine Replacement Patient Education Materials **AND** nicotine polacrilex    Labs:  Recent Labs     04/28/24 0030 04/29/24 0047 04/30/24  0045   WBC 10.5 12.0* 10.7   RBC 3.67* 3.86* 3.61*   HEMOGLOBIN 11.0* 11.5* 10.8*   HEMATOCRIT 34.4* 35.2* 33.3*   MCV 93.7 91.2 92.2   MCH 30.0 29.8 29.9   RDW 47.4 46.7 46.1   PLATELETCT 697* 634* 540*   MPV 8.5* 8.3* 8.3*   NEUTSPOLYS  --   --  69.60   LYMPHOCYTES  --   --  18.70*   MONOCYTES  --   --  9.30   EOSINOPHILS  --   --  1.10   BASOPHILS  --   --  0.70     Recent Labs     04/28/24  0030 04/30/24  0045   SODIUM 138 140   POTASSIUM 4.0 3.7   CHLORIDE 102 105   CO2 26 26    GLUCOSE 97 123*   BUN 13 11     Recent Labs     04/28/24  0030 04/30/24  0045   ALBUMIN 2.9* 2.8*   CREATININE 0.60 0.54       Imaging:  MR-FEMUR-WITH & W/O LEFT    Result Date: 4/22/2024 4/21/2024 7:15 PM HISTORY/REASON FOR EXAM:  cellulitis Left leg, worsening WBC, eval for abscess TECHNIQUE/EXAM DESCRIPTION: MRI of the LEFT femur without and with contrast. The study was performed on a [employer] Signa 1.5 Erin MRI scanner. T1 sagittal, T1 coronal, T1 axial, T2 fat-suppressed axial, T1 postcontrast fat-suppressed axial, and fast spin-echo inversion recovery sagittal images were obtained of the femur. 15 mL ProHance contrast was administered intravenously. COMPARISON: Ultrasound of the left groin/upper thigh 4/19/2024 and CT left thigh 4/16/2024 FINDINGS: There is diffuse left thigh cutaneous and subcutaneous edema most consistent with cellulitis. Additionally, there is a rim-enhancing fluid collection involving the left lateral and posterior thigh extending from the level of the greater trochanter caudally to the level of the left knee consistent with developing abscess. This abnormality measures  12 mm in thickness and 16.7 cm in anterior to posterior length extending from approximately the 1:00-2:00 position to the 7:00 position when viewed from below. Proximal aspect is at the level of the left greater trochanter and the distal aspect just above the level of the left knee. BONE AND MARROW: The bones and bone marrow are normal in signal intensity and morphology. MUSCLES: There is edema within the hamstring musculature LIGAMENTS and TENDONS: The visualized ligament and tendon are normal in appearance. MISCELLANEOUS: No fluid collection or mass.     1.  Left lateral/posterior site cellulitis 2.  Rim-enhancing fluid collection consistent with abscess within the subcutaneous tissues of the left thigh extending from the 1:00-2:00 position to the 7:00 position, measuring 12 mm in thickness, 16.7 cm anterior to  posterior, and craniocaudal extent extending from the level of the left greater trochanter to the distal thigh measuring approximately 40 cm 3.   Nonspecific edema within the hamstring musculature 4.  Negative for osteomyelitis    US-ABDOMEN LTD (SOFT TISSUE)    Result Date: 4/20/2024 4/20/2024 1:19 AM HISTORY/REASON FOR EXAM:  Edema; cellulitis of left abdominal wall; evaluation for abscess on abdominal wall TECHNIQUE/EXAM DESCRIPTION: Limited abdominal ultrasound. COMPARISON: None available. FINDINGS: Focus ultrasound of the left chest and abdominal wall demonstrates diffuse subcutaneous edema. No discrete fluid collection identified..     1. No discrete fluid collection identified.    US-EXTREMITY NON VASCULAR UNILATERAL LEFT    Result Date: 4/19/2024 4/19/2024 12:18 PM HISTORY/REASON FOR EXAM:  eval left groin/upper thigh in the erythema area to see if any evidence of fluid collections, if so, please gavin on tissue. TECHNIQUE/EXAM DESCRIPTION AND NUMBER OF VIEWS: COMPARISON: FINDINGS: Targeted sonographic evaluation of the left inguinal region and upper thigh demonstrates subcutaneous edema without a discrete fluid collection.     Edema without a discrete fluid collection.    CT-EXTREMITY, LOWER WITH LEFT    Result Date: 4/16/2024 4/16/2024 3:06 PM HISTORY/REASON FOR EXAM:  Worsening And Ascending Erythema/Cellulitis. TECHNIQUE/EXAM DESCRIPTION AND NUMBER OF VIEWS:  CT scan of the LEFT lower extremity with contrast, with reconstructions. Thin helical 3 mm sections were obtained from the distal femur through the proximal tibia/fibula. Sagittal and coronal multiplanar reconstructions were generated from the axial images. A total of 100 mL of Omnipaque 350 nonionic contrast was administered  IV without complication. Up to date radiation dose reduction adjustments have been utilized to meet ALARA standards for radiation dose reduction. COMPARISON: None. FINDINGS: There are curvilinear areas of fluid tissue  attenuation involving the subcutaneous tissues of the left thigh and lower leg most pronounced involving the lateral aspect of the thigh where it measures 1.3 cm in greatest diameter. The muscles of the left thigh and lower leg have a normal appearance and enhancement pattern. There is no evidence of deep abscess formation. There is no evidence of acute osteomyelitis     1.  Subcutaneous inflammatory changes about the left lower leg and thigh suspicious for cellulitis. 2.  Curvilinear fluid attenuation adjacent to the musculature of the lateral aspect of the left thigh measuring 1.3 cm in greatest diameter suspicious for subcutaneous seroma less likely early abscess formation.    EC-ECHOCARDIOGRAM COMPLETE W/O CONT    Result Date: 4/15/2024  Transthoracic Echo Report Echocardiography Laboratory CONCLUSIONS No prior study is available for comparison. Normal left ventricular systolic function. The left ventricular ejection fraction is visually estimated to be 65%. Mild tricuspid regurgitation. Estimated right ventricular systolic pressure is 35 mmHg. JA FINLEY Exam Date:         04/15/2024                    15:05 Exam Location:     Inpatient Priority:          Routine Ordering Physician:        IVONE CARLTON                            TRIAGE Referring Physician:       [id number] Huang Sonographer:               Edin ALEJANDRO Age:    53     Gender:    M MRN:    [id number] :    1970 BSA:    2      Ht (in):    71     Wt (lb):    176 Exam Type:     Complete Indications:     Endocarditis ICD Codes:       421 CPT Codes:       55414 BP:   92     /   51     HR: Technical Quality:       Fair MEASUREMENTS  (Male / Female) Normal Values 2D ECHO LV Diastolic Diameter PLAX        5.1 cm                4.2 - 5.9 / 3.9 - 5.3 cm LV Systolic Diameter PLAX         3.5 cm                2.1 - 4.0 cm IVS Diastolic Thickness           0.95 cm               LVPW Diastolic Thickness          1 cm                  LVOT  Diameter                     2.2 cm                Estimated LV Ejection Fraction    65 %                  LV Ejection Fraction MOD BP       66.2 %                >= 55  % LV Ejection Fraction MOD 4C       71.1 %                LV Ejection Fraction MOD 2C       60.2 %                DOPPLER AV Peak Velocity                  1.3 m/s               AV Peak Gradient                  6.9 mmHg              AV Mean Gradient                  3.9 mmHg              LVOT Peak Velocity                1.1 m/s               AV Area Cont Eq vti               2.8 cm2               MV Velocity Time Integral         18.1 cm               Mitral E Point Velocity           0.8 m/s               Mitral E to A Ratio               1.1                   MV Pressure Half Time             37.5 ms               MV Area PHT                       5.9 cm2               MV Deceleration Time              129 ms                TR Peak Velocity                  283 cm/s              PV Peak Velocity                  0.99 m/s              PV Peak Gradient                  3.9 mmHg              * Indicates values subject to auto-interpretation LV EF:  65    % FINDINGS Left Ventricle Normal left ventricular chamber size. Normal left ventricular wall thickness. Normal left ventricular systolic function. The left ventricular ejection fraction is visually estimated to be 65%. Normal regional wall motion. Normal diastolic function. Right Ventricle The right ventricle is normal in size and systolic function. Right Atrium The right atrium is normal in size. Normal inferior vena cava size and inspiratory collapse. Left Atrium The left atrium is normal in size. Left atrial volume index is 23 mL/sq m. Mitral Valve Structurally normal mitral valve without significant stenosis or regurgitation. Aortic Valve Structurally normal aortic valve without significant stenosis or regurgitation. Tricuspid Valve Structurally normal tricuspid valve without significant  stenosis. Mild tricuspid regurgitation. Right atrial pressure is estimated to be 3 mmHg. Estimated right ventricular systolic pressure is 35 mmHg. Pulmonic Valve Structurally normal pulmonic valve without significant stenosis or regurgitation. Pericardium No pericardial effusion. Aorta Normal aortic root for body surface area. The ascending aorta is borderline dilated with a diameter of  4.0 cm. Ricky Gaines M.D. (Electronically Signed) Final Date:     15 April 2024                 16:06    US-EXTREMITY VENOUS LOWER UNILAT LEFT    Result Date: 4/14/2024  CLINICAL INFORMATION: Left lower extremity pain/edema. PROCEDURE: Ultrasound examination of left lower extremity deep venous system was performed using grayscale, color and spectral Doppler in the ultrasound section. COMPARISON: None. FINDINGS: LEFT: The left common femoral, saphenofemoral junction, femoral, and popliteal veins demonstrate a normal response to augmentation and compression maneuvers. There is also a normal response to respiratory variation. The bifurcation of the common femoral vein into the superficial and deep femoral veins is visualized.  Flow is identified in these vessels with no evidence of intraluminal thrombus on either color Doppler or grayscale images. The visualized portions of the left proximal calf veins are also normal.     No evidence of acute deep venous thrombosis in the visualized venous segments of the left lower extremity.     DX-CHEST-FOR LINE PLACEMENT Perform procedure in: Patient's Room    Result Date: 4/13/2024 4/13/2024 9:11 PM HISTORY/REASON FOR EXAM:  Other (Comment); line. TECHNIQUE/EXAM DESCRIPTION AND NUMBER OF VIEWS: Single view of the chest. COMPARISON: None FINDINGS: There has been interval insertion of a central venous catheter which terminates with the tip projecting over the expected region of the mid to distal superior vena cava. Heart size is within normal limits. No pulmonary infiltrates or consolidations  are noted. No pleural abnormalities are noted.     1.  Interval insertion of a central venous catheter which terminates with the tip projecting over the expected region of the mid to distal superior vena cava. 2.  No acute cardiopulmonary.    CT-EXTREMITY, LOWER W/O LEFT    Result Date: 4/13/2024 4/13/2024 5:02 PM HISTORY/REASON FOR EXAM:  Leg swelling, body aches. TECHNIQUE/EXAM DESCRIPTION AND NUMBER OF VIEWS: CT scan of the LEFT lower extremity without contrast and including reconstructions. Thin-section noncontrast helical images were obtained. Coronal and sagittal reconstructions were generated from the axial images. Up to date radiation dose reduction adjustments have been utilized to meet ALARA standards for radiation dose reduction. COMPARISON: None. FINDINGS: There is extensive left lower extremity edema. Skin thickening and subcutaneous fat stranding is seen extending from the thigh into the calf, ankle and foot. No drainable fluid collections are seen on this noncontrast study. No acute fracture or dislocation. No significant osteoarthrosis. Visualized pelvic organs are unremarkable. Left inguinal and iliac lymphadenopathy. Lymph nodes measure up to 1.4 cm short axis in the left external iliac region.     1.  Left lower extremity edema versus cellulitis. No drainable fluid collections on this noncontrast study. 2.  Enlarged left external iliac and inguinal lymph nodes. They may be reactive to the left lower extremity infection, however, neoplastic nodes are difficult to rule out. Close clinical follow-up is needed.    DX-CHEST-PORTABLE (1 VIEW)    Result Date: 4/13/2024 4/13/2024 4:19 PM HISTORY/REASON FOR EXAM:  Sepsis. TECHNIQUE/EXAM DESCRIPTION AND NUMBER OF VIEWS: Single portable view of the chest. COMPARISON:  None. FINDINGS: LUNGS: Clear. No effusions. PNEUMOTHORAX: None. LINES AND TUBES: None. MEDIASTINUM: No cardiomegaly. MUSCULOSKELETAL STRUCTURES: No acute displaced fracture.     No acute  cardiopulmonary abnormality.       Micro:  Results       ** No results found for the last 168 hours. **            Assessment:  Active Hospital Problems    Diagnosis     *Septic shock (HCC) [A41.9, R65.21]     Bacteremia due to Streptococcus [R78.81, B95.5]     Hypophosphatemia [E83.39]     Hypokalemia [E87.6]     Cellulitis of left lower extremity [L03.116]     Hyperglycemia [R73.9]     Tobacco dependence [F17.200]     Acute kidney injury (HCC) [N17.9]     Dehydration [E86.0]     Hyponatremia [E87.1]     Metabolic acidosis [E87.20]      Interval 24 hours:      AF, O2 RA  Labs reviewed to assess for clinical status, drug toxicity and organ function  Micro reviewed    Patient still with ongoing drainage but overall it is improved as is the appearance of the leg.  Agree with transition to orals.  See recommendations as below.      Hospital Course/Assessment:   Oneal Tillman is a 53 y.o. male with with prior tobacco dependence, at risk for diabetes, admitted with left lower extremity cellulitis and group A strep bacteremia, penicillin susceptible.  MRI on 4/22 revealed rim-enhancing fluid collection just with abscess in the tissues of the left thigh measuring 12 mm in thickness, 16.7 cm x 40 cm, no osteomyelitis.     Pertinent Diagnoses:  Group A strep bacteremia, penicillin susceptible  Left lower extremity cellulitis  Left thigh abscess  Leukocytosis, improved      Plan:      -ID saw the patient earlier and plan had been for 2-week antibiotic course if no abscess.  However abscess was identified and the patient has been continued on Unasyn.  Overall he is improving normalization of WBC and less pain and swelling and erythema.  Antibiotics were continued given considerable drainage and while the leg is still draining it is improved.     --- Agree with stopping IV Unasyn, will transition to Augmentin 8 and 5/125 3 times daily and on discharge please give the patient an additional 21 days of antibiotic  --- Recommend  monitoring for at least on the 24 hours to ensure no worsening  --- Recommend follow-up in wound care clinic    Follow-up in ID clinic in ~ 2 weeks with PEDRO Allen.  Please place referral for outpatient ID follow-up     Disposition: Okay for discharge if doing well tomorrow.      Plan was discussed with the primary team, Dr. Zendejas. This illness poses a threat to life and limb function.  Please call us back if any worsening.  ID will sign off.

## 2024-04-30 NOTE — CARE PLAN
The patient is Stable - Low risk of patient condition declining or worsening    Shift Goals  Clinical Goals: IV abx, monitor wound  Patient Goals: find backpack  Family Goals: No family present    Progress made toward(s) clinical / shift goals:    Problem: Pain - Standard  Goal: Alleviation of pain or a reduction in pain to the patient’s comfort goal  Outcome: Progressing  Note: Pain medication administered per MAR. Pt reports pain relief from medication and left leg elevation.     Problem: Wound/ / Incision Healing  Goal: Patient's wound/surgical incision will decrease in size and heals properly  Outcome: Progressing  Note: Left leg dressing observed. Dressing clean, dry, & intact. No drainage observed.        Patient is not progressing towards the following goals:

## 2024-04-30 NOTE — DISCHARGE PLANNING
Case Management Discharge Planning    Admission Date: 4/13/2024  GMLOS: 5.1  ALOS: 17    6-Clicks ADL Score: 24  6-Clicks Mobility Score: 24      Anticipated Discharge Dispo: Discharge Disposition: Discharged to home/self care (01)    Action(s) Taken: Updated Worker's Compensation  on Discharge Plan    @1048  Patient may be medically cleared for discharge later today. DO reports antibiotics will be oral and patient will not need wound care.    @1212  Met with [facility] Fuller liaison. Medical updated provided.    @9242  Call placed to patient's workers comp , Nida. Informed Nida that, per DO, patient will transition to oral antibiotics and will not need wound care. Nida requested continued updates regarding discharge plan. Reports she can assist with wound care set up if it's needed.

## 2024-04-30 NOTE — PROGRESS NOTES
Hospital Medicine Daily Progress Note    Date of Service  4/30/2024    Chief Complaint  Oneal Tillman is a 53 y.o. male admitted 4/13/2024 with left leg pain and erythema    Hospital Course  Patient is a 53-year-old male with diabetes and tobacco dependence who presented with leg swelling of the left leg for 1 week and body aches.  Questionable spider bite on the left leg after he changed his pants and then 2 days later started having worsening pain redness and swelling.  Also noticed chills palpitations weakness.  Tachycardic with a heart rate of 104 and low blood pressure.  His initial lactic acid was 4.3 and white count was 15.  Patient was initially admitted and given fluid resuscitation and started on IV antibiotics.  Blood cultures came back positive for group B strep and infectious disease recommended in addition to continuation of Zyvox to add pen G.  He is having worsening left leg erythema and ascending rash with blistering lesions.  Repeat CT left leg pending.    Interval Problem Update  4/16 patient states he is having significant pain in the left leg, thinks he feels his thigh is softer but he still having excruciating pain.  Denies any fevers overnight but is diaphoretic at times.  Patient now with bulla in the left groin and significant scrotal edema.  IV fluids discontinued as his acute kidney injury has resolved, single dose IV Lasix ordered, repeat CT left lower extremity per infectious disease recommendations to make sure there is not significant progression or gas producing lesion.  4/17 patient seen this morning and then later discussed again with infectious disease this afternoon.  On my evaluation his left leg was less edematous and less erythematous.  Throughout the afternoon however ID has noticed that the erythema and slight edema has now affected his left upper thigh all the way to the left lower abdomen.  WBC count is slightly better.  Patient has been afebrile.  The CT leg showed a very  small fluid collection which likely is not amenable at this time for surgical intervention or aspiration of the area.  Again low threshold to reconsult surgery if there is any worsening of the patient's status however we will continue with antibiotics and will give another dose of diuresis.  4/18 patient doing about the same from a physical standpoint erythema remains up to the left flank, patient overall is feeling better but given his elevated WBC, ID recommended general surgery reconsultation.  General surgery does not feel that intervention from their standpoint is appropriate at this time.  If there is an attainable fluid collection aspiration recommended.  WBC count up to 31.5 today.  Patient is very frustrated with his current plan of care given the possibility of upcoming surgery and the fact that he was labeled to be diabetic because of elevated blood glucose.  As surgery is not happening today regular diet has been reinitiated.  4/19 patient states overall he is feeling better.  The erythema on his left flank is the same if not slightly better and less superior.  The blister in the left groin has ruptured, there is firmness to the left medial thigh and ultrasound will be done over this area to see if there is any evidence of abscess that could be aspirated.  4/20 patient with visual improvement to his flank erythema and edema.  It has regressed past 3 days worth of demarcation.  The majority of his erythema is the left lateral thigh.  Multiple MRIs pending to help image the area to make sure that infection is improving.  WBC count continues to elevate but patient clinically looks better today.  4/21 WBC count is actually regressing finally.  Down to 33.4.  The erythema seems to have localized to the left lateral thigh and most superior aspect is the left inguinal fold.  He does have some fluctuance left lateral thigh however is not tender.  Awaiting MRI of the left leg, abdomen pelvis with contrast.  4/22  patient feeling good.  MRI was completed yesterday which was possible for a 1.3 cm x 40 cm fluid collection on report.  This was discussed with interventional radiology Dr. Terrell who evaluated the images and also feels that this is not a delineated fluid collection but more so fluid in the tissues itself.  He does not feel that he would be able to reach this with a needle for aspiration.  I then took the ultrasound to bedside and evaluated the patient's fluid collection myself and agree with the radiologist that there is not a organized area to place the needle for aspiration.  WBC count continues to drop and is down to 28.  Patient remains afebrile and though he is still having weeping at the posterior aspect of his left knee is showing overall significant improvement.  MRI of the abdomen pelvis have been canceled as the erythema in this area has resolved.    4/30 patient developed a draining lesion in the posterior region of his left knee.  The drainage has significantly improved in the last 24 hours per patient.  WBC count is down to 10.7 and he has been remained afebrile.  He is able to ambulate and pain is under good control.  I milked the inferior and superior aspects of the lesion to see if I could increase the drainage and minimal drainage had resulted.  Because of this I decreased his antibiotic from IV to p.o. Augmentin and will continue to monitor him for an additional 24 hours to make sure there is not an increase in drainage and that his WBC count does not increase exponentially.  Per infectious disease plan is 21 days of additional p.o. Augmentin upon discharge.    I have discussed this patient's plan of care and discharge plan at IDT rounds today with Case Management, Nursing, Nursing leadership, and other members of the IDT team.    Consultants/Specialty  infectious disease  General surgery    Code Status  Full Code    Disposition  The patient is not medically cleared for discharge to home or a  post-acute facility.  Anticipate discharge to: home with close outpatient follow-up    I have placed the appropriate orders for post-discharge needs.    Review of Systems  Review of Systems   Constitutional:  Negative for chills and fever.   HENT:  Negative for congestion.    Eyes:  Negative for blurred vision and photophobia.   Respiratory:  Negative for cough and shortness of breath.    Cardiovascular:  Negative for chest pain, claudication and leg swelling (Left leg is no longer fluctuant, scrotal edema has resolved).   Gastrointestinal:  Negative for abdominal pain, constipation, diarrhea, heartburn and vomiting.   Genitourinary:  Negative for dysuria and hematuria.   Musculoskeletal:  Negative for joint pain and myalgias.   Skin:  Negative for itching and rash.   Neurological:  Negative for dizziness, sensory change, speech change, weakness and headaches.   Psychiatric/Behavioral:  Negative for depression. The patient is not nervous/anxious and does not have insomnia.         Physical Exam  Temp:  [36.3 °C (97.4 °F)-37.4 °C (99.3 °F)] 37.4 °C (99.3 °F)  Pulse:  [68-83] 77  Resp:  [16-18] 16  BP: ()/(49-60) 104/57  SpO2:  [95 %-97 %] 96 %    Physical Exam  Vitals and nursing note reviewed.   Constitutional:       General: He is not in acute distress.     Appearance: Normal appearance.   HENT:      Head: Normocephalic and atraumatic.   Eyes:      General: No scleral icterus.     Extraocular Movements: Extraocular movements intact.   Cardiovascular:      Rate and Rhythm: Normal rate and regular rhythm.      Pulses: Normal pulses.      Heart sounds: Normal heart sounds. No murmur heard.  Pulmonary:      Effort: Pulmonary effort is normal. No respiratory distress.      Breath sounds: Normal breath sounds. No wheezing, rhonchi or rales.   Abdominal:      General: Abdomen is flat. Bowel sounds are normal. There is no distension.      Palpations: Abdomen is soft.      Tenderness: There is no rebound.       Comments: Erythema no longer present left lower abdomen or flank, ends at the inguinal fold     Genitourinary:     Comments: Scrotal edema improved.    Musculoskeletal:         General: No swelling or tenderness.      Cervical back: Normal range of motion and neck supple.      Left lower leg: Edema (Compared to last week his left leg looks markedly better.  There are still a few areas of increased density but not fluctuance.) present.   Lymphadenopathy:      Cervical: No cervical adenopathy.   Skin:     General: Skin is warm.      Coloration: Skin is not jaundiced.      Findings: No bruising or erythema.      Comments: Blisters resolved, have ruptured   Neurological:      General: No focal deficit present.      Mental Status: He is alert and oriented to person, place, and time. Mental status is at baseline.      Cranial Nerves: No cranial nerve deficit.   Psychiatric:         Mood and Affect: Mood normal.         Behavior: Behavior normal.         Fluids    Intake/Output Summary (Last 24 hours) at 4/30/2024 1638  Last data filed at 4/29/2024 2343  Gross per 24 hour   Intake 291.56 ml   Output --   Net 291.56 ml       Laboratory  Recent Labs     04/28/24  0030 04/29/24  0047 04/30/24  0045   WBC 10.5 12.0* 10.7   RBC 3.67* 3.86* 3.61*   HEMOGLOBIN 11.0* 11.5* 10.8*   HEMATOCRIT 34.4* 35.2* 33.3*   MCV 93.7 91.2 92.2   MCH 30.0 29.8 29.9   MCHC 32.0* 32.7 32.4   RDW 47.4 46.7 46.1   PLATELETCT 697* 634* 540*   MPV 8.5* 8.3* 8.3*     Recent Labs     04/28/24  0030 04/30/24  0045   SODIUM 138 140   POTASSIUM 4.0 3.7   CHLORIDE 102 105   CO2 26 26   GLUCOSE 97 123*   BUN 13 11   CREATININE 0.60 0.54   CALCIUM 8.3* 8.1*                   Imaging  MR-FEMUR-WITH & W/O LEFT   Final Result      1.  Left lateral/posterior site cellulitis      2.  Rim-enhancing fluid collection consistent with abscess within the subcutaneous tissues of the left thigh extending from the 1:00-2:00 position to the 7:00 position, measuring 12 mm in  thickness, 16.7 cm anterior to posterior, and craniocaudal extent    extending from the level of the left greater trochanter to the distal thigh measuring approximately 40 cm      3.   Nonspecific edema within the hamstring musculature      4.  Negative for osteomyelitis      US-ABDOMEN LTD (SOFT TISSUE)   Final Result         1. No discrete fluid collection identified.      US-EXTREMITY NON VASCULAR UNILATERAL LEFT   Final Result      Edema without a discrete fluid collection.      CT-EXTREMITY, LOWER WITH LEFT   Final Result      1.  Subcutaneous inflammatory changes about the left lower leg and thigh suspicious for cellulitis.      2.  Curvilinear fluid attenuation adjacent to the musculature of the lateral aspect of the left thigh measuring 1.3 cm in greatest diameter suspicious for subcutaneous seroma less likely early abscess formation.      EC-ECHOCARDIOGRAM COMPLETE W/O CONT   Final Result      US-EXTREMITY VENOUS LOWER UNILAT LEFT   Final Result         No evidence of acute deep venous thrombosis in the visualized venous segments of the left lower extremity.         DX-CHEST-FOR LINE PLACEMENT Perform procedure in: Patient's Room   Final Result      1.  Interval insertion of a central venous catheter which terminates with the tip projecting over the expected region of the mid to distal superior vena cava.   2.  No acute cardiopulmonary.      CT-EXTREMITY, LOWER W/O LEFT   Final Result      1.  Left lower extremity edema versus cellulitis. No drainable fluid collections on this noncontrast study.   2.  Enlarged left external iliac and inguinal lymph nodes. They may be reactive to the left lower extremity infection, however, neoplastic nodes are difficult to rule out. Close clinical follow-up is needed.      DX-CHEST-PORTABLE (1 VIEW)   Final Result      No acute cardiopulmonary abnormality.              Assessment/Plan  * Septic shock (HCC)- (present on admission)  Assessment & Plan  WBC count down to 10.7,  drainage minimal at the left knee  Transition to Augmentin, discussed with infectious disease    Hypokalemia- (present on admission)  Assessment & Plan  Potassium 4.0    Hypophosphatemia- (present on admission)  Assessment & Plan  Phosphate 3.1    Bacteremia due to Streptococcus- (present on admission)  Assessment & Plan  4/13/24 blood culture positive for strep group A  4/15  -cultures negative x 2  Echo did not show vegetations    Continue IV Unasyn as per ID recommendations    Metabolic acidosis- (present on admission)  Assessment & Plan  Bicarb 24    Hyponatremia- (present on admission)  Assessment & Plan  Sodium 138    Dehydration- (present on admission)  Assessment & Plan  Resolved, IV diuresis prn      Acute kidney injury (HCC)- (present on admission)  Assessment & Plan  Resolved, now with volume overload, continue as needed Lasix    Tobacco dependence- (present on admission)  Assessment & Plan  Counseled on admission    Hyperglycemia- (present on admission)  Assessment & Plan  A1c 5.8, prediabetes    Cellulitis of left lower extremity- (present on admission)  Assessment & Plan  I discussed with surgery, continue to monitor at this time and complete antibiotics.  No surgery.  Wound care consult has been placed and pending  - I discussed with general surgery, he agreed improving on walking will help the abscess to drain  Decreased drainage from the left knee, milked the both superior and inferior aspect to the wound and no increase in drainage.  No purulence, no foul smell.  Discussed with infectious disease and okay to transition to oral regimen, will monitor for an additional 24 hours to make sure the WBC count does not increase exponentially and to make sure that the drainage does not worsen and anticipate patient should be able to discharge home in the next 24 to 48 hours.         VTE prophylaxis:    Xarelto 10mg daily as prophylaxis      I have performed a physical exam and reviewed and updated ROS and  Plan today (4/30/2024). In review of yesterday's note (4/29/2024), there are no changes except as documented above.

## 2024-04-30 NOTE — CARE PLAN
The patient is Stable - Low risk of patient condition declining or worsening    Shift Goals  Clinical Goals: [place] to PO ABx, D/C 5/1?  Patient Goals: find ID, go home  Family Goals: No family present    Progress made toward(s) clinical / shift goals:      Problem: Knowledge Deficit - Standard  Goal: Patient and family/care givers will demonstrate understanding of plan of care, disease process/condition, diagnostic tests and medications  Description: Target End Date:  1-3 days or as soon as patient condition allows    Document in Patient Education    1.  Patient and family/caregiver oriented to unit, equipment, visitation policy and means for communicating concern  2.  Complete/review Learning Assessment  3.  Assess knowledge level of disease process/condition, treatment plan, diagnostic tests and medications  4.  Explain disease process/condition, treatment plan, diagnostic tests and medications  Outcome: Progressing  Note: Pt updated on POC to include pain management, infection control, wound care, switching to PO ABx and possible D/C tomorrow.      Problem: Pain - Standard  Goal: Alleviation of pain or a reduction in pain to the patient’s comfort goal  Description: Target End Date:  Prior to discharge or change in level of care    Document on Vitals flowsheet    1.  Document pain using the appropriate pain scale per order or unit policy  2.  Educate and implement non-pharmacologic comfort measures (i.e. relaxation, distraction, massage, cold/heat therapy, etc.)  3.  Pain management medications as ordered  4.  Reassess pain after pain med administration per policy  5.  If opiods administered assess patient's response to pain medication is appropriate per POSS sedation scale  6.  Follow pain management plan developed in collaboration with patient and interdisciplinary team (including palliative care or pain specialists if applicable)  Outcome: Progressing     Problem: Physical Regulation  Goal: Diagnostic test  results will improve  Description: Target End Date:  Prior to discharge or change in level of care    1.  Monitor lactic acid levels  2.  Monitor ABG's  3.  Monitor diagnostic test results  Outcome: Progressing  Goal: Signs and symptoms of infection will decrease  Description: Target End Date:  Prior to discharge or change in level of care    1.  Remove potential routes of infection, such as central lines and urinary catheter  2.  Follow facility protocol for changing IV tubing and sites  3.  Collaborate with Infectious Disease  4.  Antibiotic therapy per provider order  5.  Note drug effects and monitor for antibiotic toxicity  Outcome: Progressing     Problem: Fall Risk  Goal: Patient will remain free from falls  Description: Target End Date:  Prior to discharge or change in level of care    Document interventions on the [employer] [facility] Fall Risk Assessment    1.  Assess for fall risk factors  2.  Implement fall precautions  Outcome: Progressing     Problem: Skin Integrity  Goal: Skin integrity is maintained or improved  Description: Target End Date:  Prior to discharge or change in level of care    Document interventions on Skin Risk/Narciso flowsheet groups and corresponding LDA    1.  Assess and monitor skin integrity, appearance and/or temperature  2.  Assess risk factors for impaired skin integrity and/or pressures ulcers  3.  Implement precautions to protect skin integrity in collaboration with interdisciplinary team  4.  Implement pressure ulcer prevention protocol if at risk for skin breakdown  5.  Confirm wound care consult if at risk for skin breakdown  6.  Ensure patient use of pressure relieving devices  (Low air loss bed, waffle overlay, heel protectors, ROHO cushion, etc)  Outcome: Progressing     Problem: Wound/ / Incision Healing  Goal: Patient's wound/surgical incision will decrease in size and heals properly  Description: Target End Date:  Prior to discharge or change in level of care    Document on  LDA    1.  Assess and document surgical incision/wound  2.  Provide incision/wound care per policy and/or provider orders  3.  Manage surgical drains per policy if applicable  4.  Encourage adequate nutrition to promote wound healing  5.  Collaborate with Clinical Dietician  Outcome: Progressing       Patient is not progressing towards the following goals:

## 2024-05-01 ENCOUNTER — PHARMACY VISIT (OUTPATIENT)
Dept: PHARMACY | Facility: MEDICAL CENTER | Age: 54
End: 2024-05-01
Payer: COMMERCIAL

## 2024-05-01 VITALS
HEART RATE: 69 BPM | HEIGHT: 71 IN | WEIGHT: 158.95 LBS | BODY MASS INDEX: 22.25 KG/M2 | TEMPERATURE: 98 F | RESPIRATION RATE: 18 BRPM | SYSTOLIC BLOOD PRESSURE: 103 MMHG | OXYGEN SATURATION: 95 % | DIASTOLIC BLOOD PRESSURE: 56 MMHG

## 2024-05-01 LAB
ANION GAP SERPL CALC-SCNC: 12 MMOL/L (ref 7–16)
BUN SERPL-MCNC: 10 MG/DL (ref 8–22)
CALCIUM SERPL-MCNC: 8.7 MG/DL (ref 8.4–10.2)
CHLORIDE SERPL-SCNC: 105 MMOL/L (ref 96–112)
CO2 SERPL-SCNC: 23 MMOL/L (ref 20–33)
CREAT SERPL-MCNC: 0.64 MG/DL (ref 0.5–1.4)
ERYTHROCYTE [DISTWIDTH] IN BLOOD BY AUTOMATED COUNT: 46.7 FL (ref 35.9–50)
GFR SERPLBLD CREATININE-BSD FMLA CKD-EPI: 112 ML/MIN/1.73 M 2
GLUCOSE SERPL-MCNC: 122 MG/DL (ref 65–99)
HCT VFR BLD AUTO: 34.6 % (ref 42–52)
HGB BLD-MCNC: 11 G/DL (ref 14–18)
MCH RBC QN AUTO: 29 PG (ref 27–33)
MCHC RBC AUTO-ENTMCNC: 31.8 G/DL (ref 32.3–36.5)
MCV RBC AUTO: 91.3 FL (ref 81.4–97.8)
PLATELET # BLD AUTO: 527 K/UL (ref 164–446)
PMV BLD AUTO: 8.5 FL (ref 9–12.9)
POTASSIUM SERPL-SCNC: 3.9 MMOL/L (ref 3.6–5.5)
RBC # BLD AUTO: 3.79 M/UL (ref 4.7–6.1)
SODIUM SERPL-SCNC: 140 MMOL/L (ref 135–145)
WBC # BLD AUTO: 10.8 K/UL (ref 4.8–10.8)

## 2024-05-01 PROCEDURE — 99239 HOSP IP/OBS DSCHRG MGMT >30: CPT | Performed by: INTERNAL MEDICINE

## 2024-05-01 PROCEDURE — RXMED WILLOW AMBULATORY MEDICATION CHARGE: Performed by: INTERNAL MEDICINE

## 2024-05-01 RX ORDER — AMOXICILLIN AND CLAVULANATE POTASSIUM 875; 125 MG/1; MG/1
1 TABLET, FILM COATED ORAL EVERY 8 HOURS
Qty: 63 TABLET | Refills: 0 | Status: ACTIVE | OUTPATIENT
Start: 2024-05-01 | End: 2024-05-22

## 2024-05-01 RX ORDER — OXYCODONE HYDROCHLORIDE 10 MG/1
5-10 TABLET ORAL EVERY 6 HOURS PRN
Qty: 30 TABLET | Refills: 0 | Status: SHIPPED | OUTPATIENT
Start: 2024-05-01 | End: 2024-05-11

## 2024-05-01 RX ORDER — GABAPENTIN 100 MG/1
200 CAPSULE ORAL 3 TIMES DAILY
Qty: 90 CAPSULE | Refills: 1 | Status: SHIPPED | OUTPATIENT
Start: 2024-05-01

## 2024-05-01 RX ADMIN — NICOTINE 14 MG: 14 PATCH, EXTENDED RELEASE TRANSDERMAL at 05:32

## 2024-05-01 RX ADMIN — AMOXICILLIN AND CLAVULANATE POTASSIUM 1 TABLET: 875; 125 TABLET, FILM COATED ORAL at 05:31

## 2024-05-01 RX ADMIN — GABAPENTIN 200 MG: 100 CAPSULE ORAL at 05:32

## 2024-05-01 RX ADMIN — OXYCODONE HYDROCHLORIDE 5 MG: 5 TABLET ORAL at 03:19

## 2024-05-01 ASSESSMENT — PAIN DESCRIPTION - PAIN TYPE
TYPE: ACUTE PAIN

## 2024-05-01 NOTE — CARE PLAN
The patient is Stable - Low risk of patient condition declining or worsening    Shift Goals  Clinical Goals: PO antibiotics, monitor wound, discharge 5/1  Patient Goals: pain relief  Family Goals: No family present    Progress made toward(s) clinical / shift goals:    Problem: Discharge Barriers/Planning  Goal: Patient's continuum of care needs are met  Outcome: Progressing   IJ removed. Pt switched from IV to PO antibiotics. Plan is to D/C 5/1. Monitoring wound drainage.    Patient is not progressing towards the following goals:

## 2024-05-01 NOTE — DISCHARGE SUMMARY
"Discharge Summary    CHIEF COMPLAINT ON ADMISSION  Chief Complaint   Patient presents with    Leg Swelling     L leg x1 week. Denies CP or SOB. Denies wound or rash. Endorses nausea.    Body Aches     Reports Monday, feeling that he had h/a, body aches, nausea. Reports improvement Tuesday but states \"then I couldn't hold food down\". Reports N/V persists.        Reason for Admission  Leg Swelling     Admission Date  4/13/2024    CODE STATUS  Prior    HPI & HOSPITAL COURSE  Patient is a 53-year-old male with diabetes and tobacco dependence who presented with leg swelling of the left leg for 1 week and body aches.  Questionable spider bite on the left leg after he changed his pants and then 2 days later started having worsening pain redness and swelling.  Also noticed chills palpitations weakness.  Tachycardic with a heart rate of 104 and low blood pressure.  His initial lactic acid was 4.3 and white count was 15.  Patient was initially admitted and given fluid resuscitation and started on IV antibiotics.  Blood cultures came back positive for group B strep and infectious disease recommended in addition to continuation of Zyvox to add pen G.  He is having worsening left leg erythema and ascending rash with blistering lesions.  Repeat CT left leg was completed and did not show discernible abscess.  General surgery was reconsulted given the worsening white count, ascending erythema and worsening cellulitis.  Ultrasounds were done at bedside both by general surgery and myself without an identifiable area of abscess to aspirate.  Patient finally went under an MRI on 4/21 which did show a rim-enhancing fluid collection consistent with abscess within the subcutaneous tissue of the left thigh measuring 12 mm x 16 mm x 40 cm.  This again was ultrasounded by both myself and general surgery to see if there was a discernible area to stick in the needle and aspirate which there was not.  General surgery offered the patient " surgical intervention and the patient did not wish to pursue this.  On 4/19 antibiotics were changed from penicillin G/linezolid over to Unasyn and the WBC count as well as the ascending rash began to receded.  Patient was continued on 12 days of IV Unasyn with normalization of his WBC count.  He did develop a draining lesion behind his left knee which again was offered to have surgical opening and exploration to which the patient did not wish to pursue this.  At this time the drainage has markedly improved and with infectious disease recommendation patient has been transition from Unasyn over to Augmentin.  He will take the Augmentin 3 times daily for the next 21 days.  As far as wound care he continues to have some slight drainage behind the left knee but he feels he to be able to care for this himself.  He does continue to have significant pain and was prescribed oxycodone 5 to 10 mg every 6 hours as needed for breakthrough pain.  At this time patient feels that he can continue to care for himself outside of the hospital and shares that he will be staying at his sister's house for the next 2 weeks for assistance.  Patient to follow-up with primary care practitioner.    Therefore, he is discharged in good and stable condition to home with close outpatient follow-up.    The patient met 2-midnight criteria for an inpatient stay at the time of discharge.    Discharge Date  5/1/2024    FOLLOW UP ITEMS POST DISCHARGE  PCP-2 weeks    DISCHARGE DIAGNOSES  Principal Problem:    Septic shock (HCC) (POA: Yes)  Active Problems:    Cellulitis of left lower extremity (POA: Yes)    Hyperglycemia (POA: Yes)    Tobacco dependence (POA: Yes)    Acute kidney injury (HCC) (POA: Yes)    Dehydration (POA: Yes)    Hyponatremia (POA: Yes)    Metabolic acidosis (POA: Yes)    Bacteremia due to Streptococcus (POA: Yes)    Hypophosphatemia (POA: Yes)    Hypokalemia (POA: Yes)  Resolved Problems:    * No resolved hospital problems.  *      FOLLOW UP  No future appointments.  [facility] ([facility]) - Primary Care and Family Medicine  [address] [place] [address]  [phone]  Follow up  Call to establish with a primary care provider      MEDICATIONS ON DISCHARGE     Medication List        START taking these medications        Instructions   amoxicillin-clavulanate 875-125 MG Tabs  Commonly known as: Augmentin   Take 1 Tablet by mouth every 8 hours for 63 doses.  Dose: 1 Tablet     gabapentin 100 MG Caps  Commonly known as: Neurontin   Take 2 Capsules by mouth 3 times a day.  Dose: 200 mg     oxyCODONE immediate release 10 MG immediate release tablet  Commonly known as: Roxicodone   Take 0.5-1 Tablets by mouth every 6 hours as needed for Severe Pain for up to 10 days.  Dose: 5-10 mg            CONTINUE taking these medications        Instructions   ibuprofen 200 MG Tabs  Commonly known as: Motrin   Take 400-600 mg by mouth every 6 hours as needed for Mild Pain.  Dose: 400-600 mg              Allergies  No Known Allergies    DIET  No orders of the defined types were placed in this encounter.      ACTIVITY  As tolerated.  Weight bearing as tolerated    CONSULTATIONS  General surgery-Dr. Love  Infectious disease Dr. Kendall    PROCEDURES  none    LABORATORY  Lab Results   Component Value Date    SODIUM 140 05/01/2024    POTASSIUM 3.9 05/01/2024    CHLORIDE 105 05/01/2024    CO2 23 05/01/2024    GLUCOSE 122 (H) 05/01/2024    BUN 10 05/01/2024    CREATININE 0.64 05/01/2024        Lab Results   Component Value Date    WBC 10.8 05/01/2024    HEMOGLOBIN 11.0 (L) 05/01/2024    HEMATOCRIT 34.6 (L) 05/01/2024    PLATELETCT 527 (H) 05/01/2024        Total time of the discharge process exceeds 36 minutes.

## 2024-05-02 ENCOUNTER — TELEPHONE (OUTPATIENT)
Dept: HEALTH INFORMATION MANAGEMENT | Facility: OTHER | Age: 54
End: 2024-05-02
Payer: COMMERCIAL

## 2024-05-03 ENCOUNTER — TELEPHONE (OUTPATIENT)
Dept: HOSPITALIST | Facility: MEDICAL CENTER | Age: 54
End: 2024-05-03
Payer: COMMERCIAL

## 2024-05-03 DIAGNOSIS — S81.802A WOUND OF LEFT LOWER EXTREMITY, INITIAL ENCOUNTER: ICD-10-CM

## 2024-05-15 ENCOUNTER — TELEPHONE (OUTPATIENT)
Dept: INFECTIOUS DISEASES | Facility: MEDICAL CENTER | Age: 54
End: 2024-05-15
Payer: COMMERCIAL

## 2024-05-15 NOTE — TELEPHONE ENCOUNTER
Patients WC manager called move appt scheduled for 5/15 patient currently at [facility]

## 2024-05-16 ENCOUNTER — APPOINTMENT (OUTPATIENT)
Dept: INFECTIOUS DISEASES | Facility: MEDICAL CENTER | Age: 54
End: 2024-05-16
Attending: NURSE PRACTITIONER
Payer: COMMERCIAL

## 2024-05-21 ENCOUNTER — TELEPHONE (OUTPATIENT)
Dept: HEALTH INFORMATION MANAGEMENT | Facility: OTHER | Age: 54
End: 2024-05-21
Payer: COMMERCIAL

## 2024-05-24 ENCOUNTER — TELEPHONE (OUTPATIENT)
Dept: INFECTIOUS DISEASES | Facility: MEDICAL CENTER | Age: 54
End: 2024-05-24
Payer: COMMERCIAL

## 2024-05-24 NOTE — TELEPHONE ENCOUNTER
Nida Nurse  called patient still at [facility] may discharge in the next couple of weeks with IV abv. Advised  for [facility] to place referral for ID to continue treatment set by [facility]-all records in Care everywhere

## 2024-06-19 NOTE — OP THERAPY EVALUATION
Outpatient Physical Therapy  INITIAL EVALUATION    [facility]  [address], [address]  [facility] [address]  Phone:  [phone]    Date of Evaluation: 06/20/2024    Patient: Oneal Tillman  YOB: 1970  MRN: [id number]     Referring Provider: Niko Cox  [address] [facility] [place] [address]   Referring Diagnosis Cutaneous abscess of left lower limb [L02.416]     Time Calculation                 Chief Complaint: No chief complaint on file.    Visit Diagnoses     ICD-10-CM   1. Cutaneous abscess of left lower limb  L02.416       Date of onset of impairment: No data found    Subjective:   History of Present Illness:     Mechanism of injury:  53yr male with multiple LLE abscess s/p I&D w/ penrose drains since removed prior to hospital discharge. He is overall doing very well and his wounds are healing excellently. He has follow up planned for [facility] in [place] w/ infectious disease. 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 He notes overall that things are looking good here and the wound is healing. Pt indicates he will be f/u for wounds on 6/26/24.     Pain Behavior  Sxs: tightness in posterior leg as well as the posterior knee/medial knee, distal thigh, numb in calf region    Aggs: sitting >30 min for knee to really stiffen up, has to walk for 20min or so for the knee to calm down, walking for a while, straightening the knee, awkward twist such as rolling in bed, jobs with concrete (hurts into next day)    Eas: walking it off, sleeping with knee slightly bent    24 hour: AM really stiff or periods of immobility    Sleep: no issues    Irritability: low/mod    Yellow flags: low     Imaging: none for mskel tightness    PMHx:   "none    Profession/Recreation: pumping concrete, ladders/construction work    Goals: get back to work, less stiffness/pain, knee straightening                            Past Medical History:   Diagnosis Date    Patient denies medical problems      No past surgical history on file.  Social History     Tobacco Use    Smoking status: Every Day     Current packs/day: 1.00     Types: Cigarettes    Smokeless tobacco: Never   Substance Use Topics    Alcohol use: Yes     Comment: rarely     Family and Occupational History     Socioeconomic History    Marital status: Single     Spouse name: Not on file    Number of children: Not on file    Years of education: Not on file    Highest education level: Not on file   Occupational History    Not on file       Objective     General Comments     Knee Comments  Knee PROM    R 10-0-140    L 0-8-110     Quad set with holds 2 min ea no issues in terms of pain or fatigue    14 reps 30 sec STS; slight feeling of giving out of LLE per pt        Therapeutic Exercises (CPT 50002):     1. UPOC per WC, \"Kenroy\"      Therapeutic Exercise Summary: UPOC per WC 18 visits?    HS stretch 2x20\"  Quad sets with towel under heel x5'  SAQ 3x30\"H  Heel slides x15 with strap      Time-based treatments/modalities:           Assessment, Response and Plan:   Impairments: limited mobility, pain with function and swelling    Assessment details:  PT finds s/sx consistent with pain and mobility deficits at the L knee. This is evident with  decreased A/PROM, subjective pattern of stiffness/pain, and functional assessment. He more than likely has secondary weakness following his recent hospitalization as well in which we measured via a 30 sec STS screen. He can benefit from skilled PT care to address the above deficits.  Barriers to therapy:  Comorbidities  Prognosis: fair    Goals:   Short Term Goals:   -pt meets MCID for LEFS  -knee ROM 0-5-120 or better to improve functional use of leg with gait and for work " demands  -pt with ability to meet MCID 2-3 reps for 30 sec STS screen  -pt with ability to sit >20 min without knee stiffening up more than minor  -pt with ability to squat with minor sxs at most in order to tolerate demands with work  Short term goal time span:  2-4 weeks      Long Term Goals:    -pt meets MCID for LEFS  -knee ROM 0-0-130 or better to improve functional use of leg with gait and for work demands  -pt with ability to meet MCID 2-3 reps for 30 sec STS screen with no subj RLE weakness  -pt with ability to sit >60 min without knee stiffening up more than minor  -pt returns to work with mild pain during and mild pain by next day  Long term goal time span:  6-8 weeks    Plan:   Therapy options:  Physical therapy treatment to continue  Planned therapy interventions:  E Stim Attended (CPT 90390), E Stim Unattended (CPT 43915), Hot or Cold Pack Therapy (CPT 03582), Manual Therapy (CPT 67877), Mechanical Traction (CPT 26564), Neuromuscular Re-education (CPT 13156), Therapeutic Activities (CPT 07143) and Therapeutic Exercise (CPT 38494)  Frequency:  2x week  Duration in weeks:  8  Duration in visits:  16  Discussed with:  Patient      Functional Assessment Used  Lower Extremity Functional Scale Percentage: 33.75     Referring provider co-signature:  I have reviewed this plan of care and my co-signature certifies the need for services.    Certification Period: 06/20/2024 to  08/22/24    Physician Signature: ________________________________ Date: ______________

## 2024-06-20 ENCOUNTER — PHYSICAL THERAPY (OUTPATIENT)
Dept: PHYSICAL THERAPY | Facility: REHABILITATION | Age: 54
End: 2024-06-20
Payer: COMMERCIAL

## 2024-06-20 DIAGNOSIS — L02.416 CUTANEOUS ABSCESS OF LEFT LOWER LIMB: ICD-10-CM

## 2024-06-20 PROCEDURE — 97110 THERAPEUTIC EXERCISES: CPT

## 2024-06-20 PROCEDURE — 97163 PT EVAL HIGH COMPLEX 45 MIN: CPT

## 2024-06-24 ENCOUNTER — TELEPHONE (OUTPATIENT)
Dept: INFECTIOUS DISEASES | Facility: MEDICAL CENTER | Age: 54
End: 2024-06-24
Payer: COMMERCIAL

## 2024-06-24 ENCOUNTER — APPOINTMENT (OUTPATIENT)
Dept: PHYSICAL THERAPY | Facility: REHABILITATION | Age: 54
End: 2024-06-24
Payer: COMMERCIAL

## 2024-06-24 NOTE — TELEPHONE ENCOUNTER
Spoke with [facility] insurance WC  to advise patient follow up appt with APRN shell for 6/26/24 is cancelled provider out of clinic. Patients most recently seen at [facility] discharged on a couple days of oral abx, currently off abx. Per Nida patient needs to have the final office visit at ID prior to being able to return to full duties at his current employment. Nida stated this must be done prior to 7/11/24. Advised WC  our first available is 7/16/24 and since [facility] was the last ones to see patient he may follow up with them if needed. Nida scheduled for 7/16/24.

## 2024-06-25 NOTE — OP THERAPY DAILY TREATMENT
"  Outpatient Physical Therapy  DAILY TREATMENT     [facility] [place] [id number] [facility] [address] [facility], [address]  [place] [address]  Phone:  [phone]    Date: 06/26/2024  Patient: Oneal Tillman  YOB: 1970  MRN: [id number]   Time Calculation  Start time: 0245  Stop time: 0325 Time Calculation (min): 40 minutes   Chief Complaint: No chief complaint on file.  Visit #: 2    SUBJECTIVE:  Notes he is feeling slightly looser but similar overall. Doing his HEP some at home.    Sxs: tightness in posterior leg as well as the posterior knee/medial knee, distal thigh, numb in calf region     Aggs: sitting >30 min for knee to really stiffen up, has to walk for 20min or so for the knee to calm down, walking for a while, straightening the knee, awkward twist such as rolling in bed, jobs with concrete (hurts into next day)    OBJECTIVE:  Current objective measures:       Knee PROM     R 10-0-140     L 0- (improves to 0-9-130)     None below today  Quad set with holds 2 min ea no issues in terms of pain or fatigue     14 reps 30 sec STS; slight feeling of giving out of LLE per pt    Therapeutic Exercises (CPT 98419):     1. 1. UPOC 8/15/24 per WC, \"Kenryo\"    2. quad sets, x15, heel towel roll    3. SAQ, 2x30\"H, 3#    4. knee extension stretch, x5', 6#    8. shuttle, 4B DL/SL, cues for knee ext    9. heel slides, x15      Therapeutic Exercise Summary:   HS stretch 2x20\"  Quad sets with towel under heel x5'  SAQ 3x30\"H  Heel slides x15 with strap  Knee ext LLD stretch   Calf stretch x30\" ea      Therapeutic Treatments and Modalities:     1. Manual Therapy (CPT 73139), knee ext C/R stretch  Time-based treatments/modalities:  Physical Therapy Timed Treatment Charges  Manual therapy minutes (CPT 62885): 10 minutes  Therapeutic exercise minutes (CPT 44532): 30 minutes  ASSESSMENT:   Response to treatment: within session improvements; encouraged frequent outside of clinic to improve his flexibility. Will " progress as tolerated next session.    PLAN/RECOMMENDATIONS:   Plan for treatment: therapy treatment to continue next visit.  Planned interventions for next visit: continue with current treatment.

## 2024-06-26 ENCOUNTER — APPOINTMENT (OUTPATIENT)
Dept: INFECTIOUS DISEASES | Facility: MEDICAL CENTER | Age: 54
End: 2024-06-26
Attending: NURSE PRACTITIONER
Payer: COMMERCIAL

## 2024-06-26 ENCOUNTER — PHYSICAL THERAPY (OUTPATIENT)
Dept: PHYSICAL THERAPY | Facility: REHABILITATION | Age: 54
End: 2024-06-26
Attending: FAMILY MEDICINE
Payer: COMMERCIAL

## 2024-06-26 DIAGNOSIS — L02.416 CUTANEOUS ABSCESS OF LEFT LOWER LIMB: ICD-10-CM

## 2024-06-26 PROCEDURE — 97110 THERAPEUTIC EXERCISES: CPT

## 2024-06-26 PROCEDURE — 97140 MANUAL THERAPY 1/> REGIONS: CPT

## 2024-07-02 ENCOUNTER — PHYSICAL THERAPY (OUTPATIENT)
Dept: PHYSICAL THERAPY | Facility: REHABILITATION | Age: 54
End: 2024-07-02
Payer: COMMERCIAL

## 2024-07-02 DIAGNOSIS — L02.416 CUTANEOUS ABSCESS OF LEFT LOWER LIMB: ICD-10-CM

## 2024-07-02 PROCEDURE — 97110 THERAPEUTIC EXERCISES: CPT

## 2024-07-02 PROCEDURE — 97140 MANUAL THERAPY 1/> REGIONS: CPT

## 2024-07-10 ENCOUNTER — PHYSICAL THERAPY (OUTPATIENT)
Dept: PHYSICAL THERAPY | Facility: REHABILITATION | Age: 54
End: 2024-07-10
Payer: COMMERCIAL

## 2024-07-10 DIAGNOSIS — L02.416 CUTANEOUS ABSCESS OF LEFT LOWER LIMB: ICD-10-CM

## 2024-07-10 PROCEDURE — 97110 THERAPEUTIC EXERCISES: CPT

## 2024-07-10 PROCEDURE — 97140 MANUAL THERAPY 1/> REGIONS: CPT

## 2024-07-16 ENCOUNTER — OFFICE VISIT (OUTPATIENT)
Dept: INFECTIOUS DISEASES | Facility: MEDICAL CENTER | Age: 54
End: 2024-07-16
Attending: NURSE PRACTITIONER
Payer: COMMERCIAL

## 2024-07-16 VITALS
SYSTOLIC BLOOD PRESSURE: 120 MMHG | HEIGHT: 70 IN | DIASTOLIC BLOOD PRESSURE: 76 MMHG | OXYGEN SATURATION: 97 % | WEIGHT: 163 LBS | HEART RATE: 78 BPM | RESPIRATION RATE: 18 BRPM | TEMPERATURE: 96.4 F | BODY MASS INDEX: 23.34 KG/M2

## 2024-07-16 DIAGNOSIS — B95.5 BACTEREMIA DUE TO STREPTOCOCCUS: ICD-10-CM

## 2024-07-16 DIAGNOSIS — R78.81 BACTEREMIA DUE TO STREPTOCOCCUS: ICD-10-CM

## 2024-07-16 DIAGNOSIS — L02.416 ABSCESS OF LEFT THIGH: ICD-10-CM

## 2024-07-16 DIAGNOSIS — L03.116 CELLULITIS OF LEFT LOWER EXTREMITY: ICD-10-CM

## 2024-07-16 DIAGNOSIS — Z00.00 ROUTINE ADULT HEALTH MAINTENANCE: ICD-10-CM

## 2024-07-16 PROCEDURE — 99211 OFF/OP EST MAY X REQ PHY/QHP: CPT | Performed by: NURSE PRACTITIONER

## 2024-07-16 PROCEDURE — 99212 OFFICE O/P EST SF 10 MIN: CPT | Performed by: NURSE PRACTITIONER

## 2024-07-16 PROCEDURE — 3074F SYST BP LT 130 MM HG: CPT | Performed by: NURSE PRACTITIONER

## 2024-07-16 PROCEDURE — 3078F DIAST BP <80 MM HG: CPT | Performed by: NURSE PRACTITIONER

## 2024-07-16 ASSESSMENT — FIBROSIS 4 INDEX: FIB4 SCORE: 0.64

## 2024-07-16 ASSESSMENT — ENCOUNTER SYMPTOMS
PALPITATIONS: 0
NAUSEA: 0
COUGH: 0
DOUBLE VISION: 0
HEADACHES: 0
WHEEZING: 0
WEIGHT LOSS: 0
DIARRHEA: 0
FEVER: 0
CHILLS: 0
DIZZINESS: 0
SPUTUM PRODUCTION: 0
MYALGIAS: 0
BRUISES/BLEEDS EASILY: 0
ABDOMINAL PAIN: 0
SHORTNESS OF BREATH: 0
VOMITING: 0
FOCAL WEAKNESS: 0
NERVOUS/ANXIOUS: 0
BLURRED VISION: 0

## 2024-07-18 ENCOUNTER — PHYSICAL THERAPY (OUTPATIENT)
Dept: PHYSICAL THERAPY | Facility: REHABILITATION | Age: 54
End: 2024-07-18
Payer: COMMERCIAL

## 2024-07-18 DIAGNOSIS — L02.416 CUTANEOUS ABSCESS OF LEFT LOWER LIMB: ICD-10-CM

## 2024-07-18 PROCEDURE — 97140 MANUAL THERAPY 1/> REGIONS: CPT

## 2024-07-18 PROCEDURE — 97110 THERAPEUTIC EXERCISES: CPT

## 2024-07-23 ENCOUNTER — PHYSICAL THERAPY (OUTPATIENT)
Dept: PHYSICAL THERAPY | Facility: REHABILITATION | Age: 54
End: 2024-07-23
Payer: COMMERCIAL

## 2024-07-23 DIAGNOSIS — L02.416 CUTANEOUS ABSCESS OF LEFT LOWER LIMB: ICD-10-CM

## 2024-07-23 PROCEDURE — 97110 THERAPEUTIC EXERCISES: CPT

## 2024-07-23 PROCEDURE — 97140 MANUAL THERAPY 1/> REGIONS: CPT

## 2024-07-29 ENCOUNTER — PHYSICAL THERAPY (OUTPATIENT)
Dept: PHYSICAL THERAPY | Facility: REHABILITATION | Age: 54
End: 2024-07-29
Payer: COMMERCIAL

## 2024-07-29 DIAGNOSIS — L02.416 CUTANEOUS ABSCESS OF LEFT LOWER LIMB: ICD-10-CM

## 2024-07-29 PROCEDURE — 97140 MANUAL THERAPY 1/> REGIONS: CPT

## 2024-07-29 PROCEDURE — 97110 THERAPEUTIC EXERCISES: CPT

## 2024-08-08 NOTE — OP THERAPY DAILY TREATMENT
"  Outpatient Physical Therapy  DAILY TREATMENT     [facility] [place] [address] [facility] [address], [address]  [place] [address]  Phone:  [phone]    Date: 08/14/2024  Patient: Oneal Tillman  YOB: 1970  MRN: [id number]   Time Calculation           Chief Complaint: No chief complaint on file.  Visit #: 8    SUBJECTIVE: *** Feeling slightly better. He notes he doesn't want to do weights today as he isn't in the right clothing by accident.    Sxs: tightness in posterior leg as well as the posterior knee/medial knee, distal thigh, numb in calf region     Aggs: sitting >30 min for knee to really stiffen up, has to walk for 20min or so for the knee to calm down, walking for a while, straightening the knee, awkward twist such as rolling in bed, jobs with concrete (hurts into next day)    OBJECTIVE:  ***  Current objective measures:       Knee PROM     R 10-0-140     L 0-1-133 (improves to 0-0-136)     None below today  14 reps 30 sec STS; feels good in LE now    Therapeutic Exercises (CPT 95148):     1. 1. UPOC 8/15/24 per WC, \"Kenroy\"    2. quad set, 10x5\"H, heel towel roll    3. ASLR, with quad set initiation 4x20\"H    4. knee extension stretch, x5', 10#    5. HS stretch, x1', belt    6. calf stretch, 2x1'ea, wedge/runners lunge    7. calf raises, 2x30, DL;35# plate; focus on ecc stretch    8. shuttle, DL/SL 2x20ea, 7B SL today    9. step up, 10\" x10 ea, no sxs; easy; skipped    10. lunge, x6ea, 0#; skipped    11. RDL, 3x10, 40#KB; skipped    12. bike, 3x2' L3-7 x1' intervals, seat 9; skipped    13. goblet squat to chair tap, 3x10, 2-10#Dbs; skipped    15. kneeling knee flexion stretch, x10, L knee pn*    16. antonia stretch, x15    17. TRX squats, x10      Therapeutic Exercise Summary:   HS stretch 2x20\"  Quad sets with towel under heel x5'  SAQ 3x30\"H  Heel slides x15 with strap  Knee ext LLD stretch   Calf stretch x30\" ea      Therapeutic Treatments and Modalities:     1. Manual Therapy " (CPT 57368), GIV knee ext OP and MWM knee ext, PROM knee ext end range, good tolerance to stretch  Time-based treatments/modalities:     ASSESSMENT: *** ROM lacking at extremes of end ranges. He is making overall progress in his function. Cont skilled PT care. We discussed dec freq at this stage which I feel will be appropriate as he has been making steady progress overall.    PLAN/RECOMMENDATIONS:   Plan for treatment: therapy treatment to continue next visit.  Planned interventions for next visit: continue with current treatment.

## 2024-08-14 ENCOUNTER — APPOINTMENT (OUTPATIENT)
Dept: PHYSICAL THERAPY | Facility: REHABILITATION | Age: 54
End: 2024-08-14
Payer: COMMERCIAL

## 2024-08-28 ENCOUNTER — OCCUPATIONAL MEDICINE (OUTPATIENT)
Dept: OCCUPATIONAL MEDICINE | Facility: CLINIC | Age: 54
End: 2024-08-28
Payer: COMMERCIAL

## 2024-08-28 VITALS
DIASTOLIC BLOOD PRESSURE: 86 MMHG | WEIGHT: 166 LBS | OXYGEN SATURATION: 98 % | HEIGHT: 71 IN | SYSTOLIC BLOOD PRESSURE: 130 MMHG | HEART RATE: 74 BPM | TEMPERATURE: 98.4 F | RESPIRATION RATE: 16 BRPM | BODY MASS INDEX: 23.24 KG/M2

## 2024-08-28 DIAGNOSIS — Z74.09 IMPAIRED MOBILITY AND ENDURANCE: ICD-10-CM

## 2024-08-28 DIAGNOSIS — L02.416 ABSCESS OF LEFT LOWER EXTREMITY: ICD-10-CM

## 2024-08-28 DIAGNOSIS — M25.60: ICD-10-CM

## 2024-08-28 DIAGNOSIS — M79.605 PAIN OF LEFT LOWER EXTREMITY: ICD-10-CM

## 2024-08-28 ASSESSMENT — FIBROSIS 4 INDEX: FIB4 SCORE: 0.64

## 2024-08-28 NOTE — PROGRESS NOTES
Subjective:     Oneal Tillman is a 53 y.o. male who presents for No chief complaint on file.        (Copied from previous visit) Oneal Tillman is a 53 y.o. male with with prior tobacco dependence, at risk for diabetes, admitted with left lower extremity cellulitis and group A strep bacteremia, penicillin susceptible. 4/17/24 CT scan with curvilinear fluid collection lateral thigh but no obvious air.  MRI on 4/22 revealed rim-enhancing fluid collection just with abscess in the tissues of the left thigh measuring 12 mm in thickness, 16.7 cm x 40 cm, no osteomyelitis.  General surgery evaluated earlier on in admission and recommended conservative management.  The region of swelling and tenderness over the lateral proximal thigh closely related to previously noted narrow fluid collection on CT scan is still present (though also improved).  Patient follow-up to small draining fistula along thigh with serial purulent drainage. General surgery offered the patient surgical intervention and the patient did not wish to pursue this. On 4/19 antibiotics were changed from penicillin G/linezolid over to Unasyn and the WBC count as well as the ascending rash began to receded. Patient was continued on 12 days of IV Unasyn with normalization of his WBC count. He did develop a draining lesion behind his left knee which again was offered to have surgical opening and exploration to which the patient did not wish to pursue this. At this time the drainage has markedly improved and with infectious disease recommendation patient has been transition from Unasyn over to Augmentin. He will take the Augmentin 3 times daily for the next 21 days.     Presented to [facility] on 5/7/2024 with 3 days of worsening LLE pain and swelling from known LLE cellulitis. Noted to have tachycardia and leukocytosis meeting sepsis criteria from cellulitis with no abscess on CT this admission. Repeat CT of left upper and lower leg on 5/16/24  significant for multiloculated fluid collections in lateral and posterior thigh likely representing abscesses. ACS consulted 5/16/24, status post I&D x 4 with placement of 8 penrose drains for LLE abscesses by ACS on 5/16/24. All penrose drains were removed by ACS on 5/28/24. Status post 12 days of ceftriaxone 2 gm IV q24h 5/17/24-5/28/24 while inpatient, prescribed 2 days of cephalexin 500 mg QID at discharge to complete a total of 14 days of antibiotic therapy from date of I&D.      Today 8/28/24: The patient states that his symptoms are improving.  He was discharged from [facility] on 2 days of p.o. antibiotics which he completed without difficulty.  He does continue to have pain in the left lower extremity and difficulty with extending his leg.  He  he reports posterior knee pain and calf discomfort.  He notes that the leg also feels weak if he standing for extended periods of time and he notices it mostly when he tries to carry things up and down the stairs at home.  He has not noticed any numbness, swelling or tingling in the extremity.  He notes that he has not needed any OTC medication.  He primarily elevates his leg when he has discomfort and does try to do the stretching exercises.  He noticed the most improvement when he was doing the physical therapy.  He has not returned to work since the incident as there is no light duty available.  He is requesting further physical therapy at this time.  Nurse  present at this visit.  Plan of care discussed with patient.    ROS: All systems were reviewed on intake form, form was reviewed and signed. See scanned documents in media. Pertinent positives and negatives included in HPI.    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    [unfilled]         Assessment/Plan:       1. Abscess of left lower extremity    2. Impaired mobility and endurance  - Referral to Physical Therapy    3. Pain of left lower extremity  - Referral to Physical Therapy    4. Joint rigidity  - Referral to Physical Therapy      FROM 8/28/24  TO            Differential diagnosis, natural history, supportive care, and indications for immediate follow-up discussed.    Approximately 30 minutes were spent in reviewing notes, preparing for visit, obtaining history, exam and evaluation, patient counseling/education and post visit documentation/orders.

## 2024-09-25 ENCOUNTER — OCCUPATIONAL MEDICINE (OUTPATIENT)
Dept: OCCUPATIONAL MEDICINE | Facility: CLINIC | Age: 54
End: 2024-09-25
Payer: COMMERCIAL

## 2024-09-25 VITALS
OXYGEN SATURATION: 98 % | SYSTOLIC BLOOD PRESSURE: 126 MMHG | RESPIRATION RATE: 18 BRPM | WEIGHT: 168 LBS | HEART RATE: 72 BPM | BODY MASS INDEX: 23.52 KG/M2 | DIASTOLIC BLOOD PRESSURE: 74 MMHG | HEIGHT: 71 IN

## 2024-09-25 DIAGNOSIS — M25.60: ICD-10-CM

## 2024-09-25 DIAGNOSIS — L02.416 ABSCESS OF LEFT LOWER EXTREMITY: ICD-10-CM

## 2024-09-25 DIAGNOSIS — M79.605 PAIN OF LEFT LOWER EXTREMITY: ICD-10-CM

## 2024-09-25 DIAGNOSIS — Z74.09 IMPAIRED MOBILITY AND ENDURANCE: ICD-10-CM

## 2024-09-25 ASSESSMENT — ENCOUNTER SYMPTOMS
CONSTITUTIONAL NEGATIVE: 1
CARDIOVASCULAR NEGATIVE: 1
RESPIRATORY NEGATIVE: 1
TINGLING: 0
PSYCHIATRIC NEGATIVE: 1
WEAKNESS: 1
SENSORY CHANGE: 1
MYALGIAS: 1

## 2024-09-25 ASSESSMENT — FIBROSIS 4 INDEX: FIB4 SCORE: 0.64

## 2024-09-25 NOTE — PROGRESS NOTES
Subjective:     Oneal Tillman is a 53 y.o. male who presents for Follow-Up (WC DOI 4/5/24 Lt leg, Rm 17 same)    (Copied from previous visit) Oneal Tillman is a 53 y.o. male with with prior tobacco dependence, at risk for diabetes, admitted with left lower extremity cellulitis and group A strep bacteremia, penicillin susceptible. 4/17/24 CT scan with curvilinear fluid collection lateral thigh but no obvious air.  MRI on 4/22 revealed rim-enhancing fluid collection just with abscess in the tissues of the left thigh measuring 12 mm in thickness, 16.7 cm x 40 cm, no osteomyelitis.  General surgery evaluated earlier on in admission and recommended conservative management.  The region of swelling and tenderness over the lateral proximal thigh closely related to previously noted narrow fluid collection on CT scan is still present (though also improved).  Patient follow-up to small draining fistula along thigh with serial purulent drainage. General surgery offered the patient surgical intervention and the patient did not wish to pursue this. On 4/19 antibiotics were changed from penicillin G/linezolid over to Unasyn and the WBC count as well as the ascending rash began to receded. Patient was continued on 12 days of IV Unasyn with normalization of his WBC count. He did develop a draining lesion behind his left knee which again was offered to have surgical opening and exploration to which the patient did not wish to pursue this. At this time the drainage has markedly improved and with infectious disease recommendation patient has been transition from Unasyn over to Augmentin. He will take the Augmentin 3 times daily for the next 21 days.     Presented to [facility] on 5/7/2024 with 3 days of worsening LLE pain and swelling from known LLE cellulitis. Noted to have tachycardia and leukocytosis meeting sepsis criteria from cellulitis with no abscess on CT this admission. Repeat CT of left upper and lower leg on  5/16/24 significant for multiloculated fluid collections in lateral and posterior thigh likely representing abscesses. ACS consulted 5/16/24, status post I&D x 4 with placement of 8 penrose drains for LLE abscesses by ACS on 5/16/24. All penrose drains were removed by ACS on 5/28/24. Status post 12 days of ceftriaxone 2 gm IV q24h 5/17/24-5/28/24 while inpatient, prescribed 2 days of cephalexin 500 mg QID at discharge to complete a total of 14 days of antibiotic therapy from date of I&D.        Today 9/25/24: The patient states that his symptoms are unchanged from last visit.  He does continue to have pain in the left lower extremity, stiffness, and difficulty with extending his leg.  He  he reports posterior knee pain and calf discomfort.  Continued intermittent leg weakness if he is standing for extended periods of time and he notices it mostly when he tries to carry things up and down the stairs at home.  He has not noticed any numbness, swelling or tingling in the extremity.  He notes that he has not needed any OTC medication.  Recommend use of heat and massage to may be loose in the calf muscle, he is amenable to this.  He primarily elevates his leg when he has discomfort and does try to do the stretching exercises.  He return to physical therapy he is doing the exercises recommended .  He has not returned to work since the incident as there is no light duty available.  Nurse  present at this visit.  Plan of care discussed with patient.       Review of Systems   Constitutional: Negative.    Respiratory: Negative.     Cardiovascular: Negative.    Musculoskeletal:  Positive for myalgias. Negative for joint pain.   Skin:         Well-healed surgical wounds and the calf is firm   Neurological:  Positive for sensory change and weakness. Negative for tingling.   Psychiatric/Behavioral: Negative.         [employer]CH[employer]: Works as  at [employer]  FH: No pertinent family history to this  "problem       Objective:     /74   Pulse 72   Resp 18   Ht 1.803 m (5' 11\")   Wt 76.2 kg (168 lb)   SpO2 98%   BMI 23.43 kg/m²     Constitutional: Patient is in no acute distress. Appears well-developed and well-nourished.   Cardiovascular: Normal rate.    Pulmonary/Chest: Effort normal. No respiratory distress.   Neurological: Patient is alert and oriented to person, place, and time.   Skin: Skin is warm and dry.   Psychiatric: Normal mood and affect. Behavior is normal.      Left leg surgical wounds healing appropriately with no surrounding erythema, swelling or drainage.  Multiple healed surgical wounds.  All wound edges well-approximated.  Completely healed.  Slight antalgic gait noted.  Pulses 2+ intact.    Assessment/Plan:       1. Abscess of left lower extremity    2. Impaired mobility and endurance    3. Joint rigidity    4. Pain of left lower extremity    Follow-up in 5 weeks  Continue with OTC Tylenol/ibuprofen if needed, ice/heat application, and gentle range of motion stretching as tolerated  Return to clinic sooner with new or worsening symptoms for further evaluation and management of symptoms          Differential diagnosis, natural history, supportive care, and indications for immediate follow-up discussed.    Approximately 25 minutes was spent in preparing for visit, obtaining history, exam and evaluation, patient counseling/education and post visit documentation/orders.    "

## 2024-09-25 NOTE — LETTER
PHYSICIAN’S AND CHIROPRACTIC PHYSICIAN'S   PROGRESS REPORT CERTIFICATION OF DISABILITY Claim Number:     Social Security Number:    Patient’s Name: Oneal Tillman Date of Injury: 4/5/2024   Employer: [employer]  Name of MCO (if applicable):      Patient’s Job Description/Occupation:        Previous Injuries/Diseases/Surgeries Contributing to the Condition:         Diagnosis: (L02.416) Abscess of left lower extremity  (Z74.09) Impaired mobility and endurance  (M25.60) Joint rigidity  (M79.605) Pain of left lower extremity      Related to the Industrial Injury? Yes     Explain: DOI 4/5/2024: Patient banged his shin on a steel concrete form stake was driven into the ground.      Objective Medical Findings:  Left leg surgical wounds healing appropriately with no surrounding erythema, swelling or drainage.  Multiple healed surgical wounds.  All wound edges well-approximated.  Completely healed.  Slight antalgic gait noted.  Pulses 2+ intact.         None - Discharged                         Stable  Yes                 Ratable  No     X   Generally Improved                         Condition Worsened               X   Condition Same  May Have Suffered a Permanent Disability No     Treatment Plan:    Follow-up in 5 weeks  Continue with OTC Tylenol/ibuprofen if needed, ice/heat application, and gentle range of motion stretching as tolerated  Return to clinic sooner with new or worsening symptoms for further evaluation and management of symptoms         No Change in Therapy               X   PT/OT Prescribed                      Medication May be Used While Working     X   Case Management                          PT/OT Discontinued  X  Consultation  X  Further Diagnostic Studies:    Prescription(s) Physical therapy appointments as scheduled               Released to FULL DUTY /No Restrictions on (Date):  From:      Certified TOTALLY TEMPORARILY DISABLED (Indicate Dates) From:   To:     X  Released to RESTRICTED/Modified Duty on (Date): From: 9/25/2024 To: 10/30/2024   Restrictions Are:         No Sitting    No Standing    No Pulling Other:         No Bending at Waist     No Stooping     No Lifting        No Carrying     No Walking Lifting Restricted to (lbs.):  < or = to 25 pounds       No Pushing        No Climbing     No Reaching Above Shoulders       Date of Next Visit:  10/30/2024  @ 8:45 AM  Date of this Exam: 9/25/2024 Physician/Chiropractic Physician Name: STANLEY Escobar Physician/Chiropractic Physician Signature:  Denver Coughlin DO MPH                                                                                                                                                                                                            [id number] (Rev. 2/24)

## 2024-10-09 ENCOUNTER — TELEPHONE (OUTPATIENT)
Dept: PHYSICAL THERAPY | Facility: REHABILITATION | Age: 54
End: 2024-10-09
Payer: COMMERCIAL

## 2024-10-16 ENCOUNTER — APPOINTMENT (OUTPATIENT)
Dept: PHYSICAL THERAPY | Facility: REHABILITATION | Age: 54
End: 2024-10-16
Payer: COMMERCIAL

## 2024-10-30 ENCOUNTER — OCCUPATIONAL MEDICINE (OUTPATIENT)
Dept: OCCUPATIONAL MEDICINE | Facility: CLINIC | Age: 54
End: 2024-10-30
Payer: COMMERCIAL

## 2024-10-30 VITALS
TEMPERATURE: 97.9 F | BODY MASS INDEX: 24.22 KG/M2 | WEIGHT: 173 LBS | HEART RATE: 91 BPM | SYSTOLIC BLOOD PRESSURE: 134 MMHG | DIASTOLIC BLOOD PRESSURE: 82 MMHG | OXYGEN SATURATION: 97 % | HEIGHT: 71 IN | RESPIRATION RATE: 14 BRPM

## 2024-10-30 DIAGNOSIS — M79.605 PAIN OF LEFT LOWER EXTREMITY: ICD-10-CM

## 2024-10-30 DIAGNOSIS — L02.416 ABSCESS OF LEFT LOWER EXTREMITY: ICD-10-CM

## 2024-10-30 DIAGNOSIS — Z74.09 IMPAIRED MOBILITY AND ENDURANCE: ICD-10-CM

## 2024-10-30 DIAGNOSIS — M25.60: ICD-10-CM

## 2024-10-30 ASSESSMENT — FIBROSIS 4 INDEX: FIB4 SCORE: 0.66

## 2024-10-30 ASSESSMENT — ENCOUNTER SYMPTOMS
CONSTITUTIONAL NEGATIVE: 1
WEAKNESS: 0
MYALGIAS: 1
PSYCHIATRIC NEGATIVE: 1
CARDIOVASCULAR NEGATIVE: 1
TINGLING: 0
RESPIRATORY NEGATIVE: 1
SENSORY CHANGE: 1

## 2024-10-30 ASSESSMENT — PAIN SCALES - GENERAL: PAINLEVEL: 5=MODERATE PAIN

## 2025-01-29 ENCOUNTER — TELEPHONE (OUTPATIENT)
Dept: HEALTH INFORMATION MANAGEMENT | Facility: OTHER | Age: 55
End: 2025-01-29
Payer: COMMERCIAL